# Patient Record
Sex: FEMALE | Race: WHITE | NOT HISPANIC OR LATINO | Employment: PART TIME | URBAN - METROPOLITAN AREA
[De-identification: names, ages, dates, MRNs, and addresses within clinical notes are randomized per-mention and may not be internally consistent; named-entity substitution may affect disease eponyms.]

---

## 2018-04-30 ENCOUNTER — HOSPITAL ENCOUNTER (EMERGENCY)
Facility: HOSPITAL | Age: 28
Discharge: HOME | End: 2018-04-30
Payer: MEDICAID

## 2018-04-30 VITALS
OXYGEN SATURATION: 98 % | HEART RATE: 81 BPM | BODY MASS INDEX: 24.09 KG/M2 | HEIGHT: 68 IN | SYSTOLIC BLOOD PRESSURE: 127 MMHG | RESPIRATION RATE: 20 BRPM | DIASTOLIC BLOOD PRESSURE: 72 MMHG | TEMPERATURE: 99 F | WEIGHT: 158.94 LBS

## 2018-04-30 DIAGNOSIS — R74.8 ELEVATED LIVER ENZYMES: ICD-10-CM

## 2018-04-30 DIAGNOSIS — R10.9 ABDOMINAL PAIN DURING PREGNANCY: Primary | ICD-10-CM

## 2018-04-30 DIAGNOSIS — O26.899 ABDOMINAL PAIN DURING PREGNANCY: Primary | ICD-10-CM

## 2018-04-30 LAB
ALBUMIN SERPL BCP-MCNC: 4.1 G/DL
ALP SERPL-CCNC: 63 U/L
ALT SERPL W/O P-5'-P-CCNC: 584 U/L
ANION GAP SERPL CALC-SCNC: 7 MMOL/L
AST SERPL-CCNC: 249 U/L
BACTERIA GENITAL QL WET PREP: ABNORMAL
BASOPHILS # BLD AUTO: 0.03 K/UL
BASOPHILS NFR BLD: 0.4 %
BILIRUB SERPL-MCNC: 0.5 MG/DL
BILIRUB UR QL STRIP: NEGATIVE
BUN SERPL-MCNC: 11 MG/DL
CALCIUM SERPL-MCNC: 9.2 MG/DL
CHLORIDE SERPL-SCNC: 109 MMOL/L
CLARITY UR: CLEAR
CLUE CELLS VAG QL WET PREP: ABNORMAL
CO2 SERPL-SCNC: 22 MMOL/L
COLOR UR: YELLOW
CREAT SERPL-MCNC: 0.7 MG/DL
DIFFERENTIAL METHOD: NORMAL
EOSINOPHIL # BLD AUTO: 0.2 K/UL
EOSINOPHIL NFR BLD: 1.8 %
ERYTHROCYTE [DISTWIDTH] IN BLOOD BY AUTOMATED COUNT: 13 %
EST. GFR  (AFRICAN AMERICAN): >60 ML/MIN/1.73 M^2
EST. GFR  (NON AFRICAN AMERICAN): >60 ML/MIN/1.73 M^2
FILAMENT FUNGI VAG WET PREP-#/AREA: ABNORMAL
GLUCOSE SERPL-MCNC: 85 MG/DL
GLUCOSE UR QL STRIP: NEGATIVE
HCG INTACT+B SERPL-ACNC: NORMAL MIU/ML
HCT VFR BLD AUTO: 39.5 %
HGB BLD-MCNC: 13.5 G/DL
HGB UR QL STRIP: NEGATIVE
KETONES UR QL STRIP: NEGATIVE
LEUKOCYTE ESTERASE UR QL STRIP: ABNORMAL
LYMPHOCYTES # BLD AUTO: 2.6 K/UL
LYMPHOCYTES NFR BLD: 31.3 %
MCH RBC QN AUTO: 30.4 PG
MCHC RBC AUTO-ENTMCNC: 34.2 G/DL
MCV RBC AUTO: 89 FL
MICROSCOPIC COMMENT: NORMAL
MONOCYTES # BLD AUTO: 0.7 K/UL
MONOCYTES NFR BLD: 8.7 %
NEUTROPHILS # BLD AUTO: 4.9 K/UL
NEUTROPHILS NFR BLD: 57.8 %
NITRITE UR QL STRIP: NEGATIVE
PH UR STRIP: 6 [PH] (ref 5–8)
PLATELET # BLD AUTO: 223 K/UL
PMV BLD AUTO: 9.4 FL
POTASSIUM SERPL-SCNC: 3.9 MMOL/L
PROT SERPL-MCNC: 7 G/DL
PROT UR QL STRIP: NEGATIVE
RBC # BLD AUTO: 4.44 M/UL
RBC #/AREA URNS HPF: 1 /HPF (ref 0–4)
SODIUM SERPL-SCNC: 138 MMOL/L
SP GR UR STRIP: 1.02 (ref 1–1.03)
SPECIMEN SOURCE: ABNORMAL
SQUAMOUS #/AREA URNS HPF: 5 /HPF
T VAGINALIS GENITAL QL WET PREP: ABNORMAL
URN SPEC COLLECT METH UR: ABNORMAL
UROBILINOGEN UR STRIP-ACNC: 1 EU/DL
WBC # BLD AUTO: 8.41 K/UL
WBC #/AREA URNS HPF: 3 /HPF (ref 0–5)
WBC #/AREA VAG WET PREP: ABNORMAL
YEAST GENITAL QL WET PREP: ABNORMAL

## 2018-04-30 PROCEDURE — 99284 EMERGENCY DEPT VISIT MOD MDM: CPT

## 2018-04-30 PROCEDURE — 80053 COMPREHEN METABOLIC PANEL: CPT

## 2018-04-30 PROCEDURE — 84702 CHORIONIC GONADOTROPIN TEST: CPT

## 2018-04-30 PROCEDURE — 87210 SMEAR WET MOUNT SALINE/INK: CPT

## 2018-04-30 PROCEDURE — 81000 URINALYSIS NONAUTO W/SCOPE: CPT

## 2018-04-30 PROCEDURE — 85025 COMPLETE CBC W/AUTO DIFF WBC: CPT

## 2018-04-30 NOTE — ED PROVIDER NOTES
History      Chief Complaint   Patient presents with    Abdominal Pain     + abdominal pain for 3 days , + pregnancy        Review of patient's allergies indicates:  No Known Allergies     HPI   HPI    4/30/2018, 1:42 PM   History obtained from the       History of Present Illness: Shruthi Ram is a 27 y.o. female patient who presents to the Emergency Department for bilateral lower abd pain that radiates to bilateral back for 3 days.  Denies vag bleeding, vag discharge, n/v/f.   +UPT.  LMP 3/8/18.  Symptoms are moderate in severity.     No further complaints or concerns at this time.           PCP: SUE Rogers       Past Medical History:  Past Medical History:   Diagnosis Date    Hepatitis C          Past Surgical History:  History reviewed. No pertinent surgical history.        Family History:  History reviewed. No pertinent family history.        Social History:  Social History     Social History Main Topics    Smoking status: Current Every Day Smoker     Packs/day: 1.00     Types: Cigarettes    Smokeless tobacco: Never Used    Alcohol use No    Drug use: No    Sexual activity: Yes     Partners: Male       ROS     Review of Systems   Constitutional: Negative for chills and fever.   HENT: Negative for sore throat.    Respiratory: Negative for shortness of breath.    Cardiovascular: Negative for chest pain.   Gastrointestinal: Positive for abdominal pain. Negative for nausea and vomiting.   Genitourinary: Negative for dysuria and vaginal bleeding.   Musculoskeletal: Negative for joint swelling, neck pain and neck stiffness.   Skin: Negative for rash.   Neurological: Negative for weakness.   Hematological: Does not bruise/bleed easily.   All other systems reviewed and are negative.      Physical Exam      Initial Vitals [04/30/18 1334]   BP Pulse Resp Temp SpO2   134/74 84 20 98.6 °F (37 °C) 98 %      MAP       94         Physical Exam  Vital signs and nursing notes reviewed.  Constitutional:  "Patient is in NAD. Awake and alert. Well-developed and well-nourished.  Head: Atraumatic. Normocephalic.  Eyes: PERRL. EOM intact. Conjunctivae nl. No scleral icterus.  ENT: Mucous membranes are moist. Oropharynx is clear.  Neck: Supple. No JVD. No lymphadenopathy.  No meningismus  Cardiovascular: Regular rate and rhythm. No murmurs, rubs, or gallops. Distal pulses are 2+ and symmetric.  Pulmonary/Chest: No respiratory distress. Clear to auscultation bilaterally. No wheezing, rales, or rhonchi.  Abdominal: Soft. Non-distended. No TTP. No rebound, guarding, or rigidity. Good bowel sounds.  Genitourinary: No CVA tenderness.  Pelvic Exam: Vaginal exam performed with speculum and a female chaperone was present. No masses, lesions, or obvious trauma noted to external genitalia.   No lesions visible on labia majora or minora. Vaginal epithelium appears pink, moist and well-rugated with no lesions or discharge.  There is no odor, tenderness, or bleeding present. Cervical os is closed. No blood in the vaginal vault.  Patient denies any cervical motion tenderness or adnexal tenderness.  Patient tolerated well.  Musculoskeletal: Moves all extremities. No edema.   Skin: Warm and dry.  No icterus  Neurological: Awake and alert. No acute focal neurological deficits are appreciated.  Psychiatric: Normal affect. Good eye contact. Appropriate in content.      ED Course          Procedures  ED Vital Signs:  Vitals:    04/30/18 1334 04/30/18 1645   BP: 134/74 127/72   Pulse: 84 81   Resp: 20 20   Temp: 98.6 °F (37 °C) 98.5 °F (36.9 °C)   TempSrc: Oral Oral   SpO2: 98% 98%   Weight: 72.1 kg (158 lb 15.2 oz)    Height: 5' 8" (1.727 m)          Results for orders placed or performed during the hospital encounter of 04/30/18   Urinalysis   Result Value Ref Range    Specimen UA Urine, Clean Catch     Color, UA Yellow Yellow, Straw, Jannie    Appearance, UA Clear Clear    pH, UA 6.0 5.0 - 8.0    Specific Gravity, UA 1.020 1.005 - 1.030    " Protein, UA Negative Negative    Glucose, UA Negative Negative    Ketones, UA Negative Negative    Bilirubin (UA) Negative Negative    Occult Blood UA Negative Negative    Nitrite, UA Negative Negative    Urobilinogen, UA 1.0 <2.0 EU/dL    Leukocytes, UA 1+ (A) Negative   CBC auto differential   Result Value Ref Range    WBC 8.41 3.90 - 12.70 K/uL    RBC 4.44 4.00 - 5.40 M/uL    Hemoglobin 13.5 12.0 - 16.0 g/dL    Hematocrit 39.5 37.0 - 48.5 %    MCV 89 82 - 98 fL    MCH 30.4 27.0 - 31.0 pg    MCHC 34.2 32.0 - 36.0 g/dL    RDW 13.0 11.5 - 14.5 %    Platelets 223 150 - 350 K/uL    MPV 9.4 9.2 - 12.9 fL    Gran # (ANC) 4.9 1.8 - 7.7 K/uL    Lymph # 2.6 1.0 - 4.8 K/uL    Mono # 0.7 0.3 - 1.0 K/uL    Eos # 0.2 0.0 - 0.5 K/uL    Baso # 0.03 0.00 - 0.20 K/uL    Gran% 57.8 38.0 - 73.0 %    Lymph% 31.3 18.0 - 48.0 %    Mono% 8.7 4.0 - 15.0 %    Eosinophil% 1.8 0.0 - 8.0 %    Basophil% 0.4 0.0 - 1.9 %    Differential Method Automated    Comprehensive metabolic panel   Result Value Ref Range    Sodium 138 136 - 145 mmol/L    Potassium 3.9 3.5 - 5.1 mmol/L    Chloride 109 95 - 110 mmol/L    CO2 22 (L) 23 - 29 mmol/L    Glucose 85 70 - 110 mg/dL    BUN, Bld 11 6 - 20 mg/dL    Creatinine 0.7 0.5 - 1.4 mg/dL    Calcium 9.2 8.7 - 10.5 mg/dL    Total Protein 7.0 6.0 - 8.4 g/dL    Albumin 4.1 3.5 - 5.2 g/dL    Total Bilirubin 0.5 0.1 - 1.0 mg/dL    Alkaline Phosphatase 63 55 - 135 U/L     (H) 10 - 40 U/L     (H) 10 - 44 U/L    Anion Gap 7 (L) 8 - 16 mmol/L    eGFR if African American >60 >60 mL/min/1.73 m^2    eGFR if non African American >60 >60 mL/min/1.73 m^2   hCG, quantitative, pregnancy   Result Value Ref Range    hCG Quant 96457 See Text mIU/mL   Urinalysis Microscopic   Result Value Ref Range    RBC, UA 1 0 - 4 /hpf    WBC, UA 3 0 - 5 /hpf    Squam Epithel, UA 5 /hpf    Microscopic Comment SEE COMMENT    Vaginal Screen Vagina   Result Value Ref Range    Trichomonas None None    Clue Cells, Wet Prep None None     Budding Yeast None None    Fungal Hyphae None None    WBC - Vaginal Screen Few (A) None    Bacteria - Vaginal Screen Moderate (A) None    Wet Prep Source Vagina None             Imaging Results:  Imaging Results          US OB Less Than 14 Wks with Transvaginal (xpd) (Final result)  Result time 04/30/18 16:05:40   Procedure changed from US OB Less Than 14 Wks First Gestation     Final result by Razia Urban MD (04/30/18 16:05:40)                 Impression:        Early IUP with gestational age of 6 weeks and 0 days and PORTER of 12/27/18  by crown-rump length.      Electronically signed by: RAZIA URBAN MD  Date:     04/30/18  Time:    16:05              Narrative:    History: Abdominal pain during pregnancy    Comparison: None.    Findings:  BetaHCG greater than 12,199    The uterus demonstrates a gestational sac with a mean sac diameter of 1.1 cm.  Within the gestational sac is a fetus measuring 0.4 cm (CRL).  This is consistent with a 6 week and 0 day fetus. Fetal heart rate is 98 bpm.Yolk sac is unremarkable.  No subchorionic hemorrhage is identified. Uterus is retroflexed.    The uterus measures 9.1 x 5.1 x 6.7 cm     Right ovary measures 3.4 x 1.8 x 2.1 cm and is unremarkable.  Left ovary measures 4.5 x 2.5 x 3.4 cm and is unremarkable.                                 The Emergency Provider reviewed the vital signs and test results, which are outlined above.    ED Discussion             Medication(s) given in the ER:  Medications - No data to display        Follow-up Information     SUE Rogers.    Specialty:  Family Medicine  Contact information:  [address] [facility] [address] [phone]             [facility] - OB/ GYN In 2 days.    Specialty:  Obstetrics and Gynecology  Contact information:  [address] [facility] [facility] [address] [phone]  Additional information:  (off [facility]) 4th floor, Please check in for your appointment in the Women's Services Department located  through the doorway to the left of the elevators.                     There are no discharge medications for this patient.         Medical Decision Making      Liver enzymes elevated - Hep C.  Pt agrees to have close fu with obgyn.  No edema, thrombocytopenia, proteinuria, htn.  No icterus or RUQ ttp.      All findings were reviewed with the patient/family in detail.   All remaining questions and concerns were addressed at that time.  Patient/family has been counseled regarding the need for follow-up as well as the indication to return to the emergency room should new or worrisome developments occur.        MDM               Clinical Impression:        ICD-10-CM ICD-9-CM   1. Abdominal pain during pregnancy O26.899 646.80    R10.9 789.00   2. Elevated liver enzymes R74.8 790.5             Jeannette Pacheco PA-C  04/30/18 2059

## 2018-05-01 ENCOUNTER — PES CALL (OUTPATIENT)
Dept: ADMINISTRATIVE | Facility: CLINIC | Age: 28
End: 2018-05-01

## 2018-05-03 ENCOUNTER — LAB VISIT (OUTPATIENT)
Dept: LAB | Facility: HOSPITAL | Age: 28
End: 2018-05-03
Attending: ADVANCED PRACTICE MIDWIFE
Payer: MEDICAID

## 2018-05-03 ENCOUNTER — OFFICE VISIT (OUTPATIENT)
Dept: OBSTETRICS AND GYNECOLOGY | Facility: CLINIC | Age: 28
End: 2018-05-03
Payer: MEDICAID

## 2018-05-03 ENCOUNTER — TELEPHONE (OUTPATIENT)
Dept: OBSTETRICS AND GYNECOLOGY | Facility: CLINIC | Age: 28
End: 2018-05-03

## 2018-05-03 VITALS
WEIGHT: 172.38 LBS | DIASTOLIC BLOOD PRESSURE: 80 MMHG | BODY MASS INDEX: 26.13 KG/M2 | HEIGHT: 68 IN | SYSTOLIC BLOOD PRESSURE: 120 MMHG

## 2018-05-03 DIAGNOSIS — F19.11 HISTORY OF DRUG ABUSE: ICD-10-CM

## 2018-05-03 DIAGNOSIS — R79.89 ELEVATED LIVER FUNCTION TESTS: ICD-10-CM

## 2018-05-03 DIAGNOSIS — N91.2 AMENORRHEA: Primary | ICD-10-CM

## 2018-05-03 DIAGNOSIS — Z87.51 HISTORY OF PRETERM DELIVERY: ICD-10-CM

## 2018-05-03 DIAGNOSIS — N91.2 AMENORRHEA: ICD-10-CM

## 2018-05-03 DIAGNOSIS — Z86.19 HISTORY OF HEPATITIS C: ICD-10-CM

## 2018-05-03 LAB
ABO + RH BLD: NORMAL
AMPHET+METHAMPHET UR QL: NEGATIVE
BARBITURATES UR QL SCN>200 NG/ML: NEGATIVE
BENZODIAZ UR QL SCN>200 NG/ML: NEGATIVE
BLD GP AB SCN CELLS X3 SERPL QL: NORMAL
BZE UR QL SCN: NEGATIVE
CANNABINOIDS UR QL SCN: NEGATIVE
CREAT UR-MCNC: 252 MG/DL
ERYTHROCYTE [DISTWIDTH] IN BLOOD BY AUTOMATED COUNT: 12.9 %
HCT VFR BLD AUTO: 40.2 %
HGB BLD-MCNC: 12.9 G/DL
MCH RBC QN AUTO: 30 PG
MCHC RBC AUTO-ENTMCNC: 32.1 G/DL
MCV RBC AUTO: 94 FL
METHADONE UR QL SCN>300 NG/ML: NEGATIVE
OPIATES UR QL SCN: NEGATIVE
PCP UR QL SCN>25 NG/ML: NEGATIVE
PLATELET # BLD AUTO: 237 K/UL
PMV BLD AUTO: 10.2 FL
RBC # BLD AUTO: 4.3 M/UL
TOXICOLOGY INFORMATION: NORMAL
WBC # BLD AUTO: 9 K/UL

## 2018-05-03 PROCEDURE — 99213 OFFICE O/P EST LOW 20 MIN: CPT | Mod: PBBFAC,25 | Performed by: ADVANCED PRACTICE MIDWIFE

## 2018-05-03 PROCEDURE — 85027 COMPLETE CBC AUTOMATED: CPT

## 2018-05-03 PROCEDURE — 81025 URINE PREGNANCY TEST: CPT | Mod: PBBFAC | Performed by: ADVANCED PRACTICE MIDWIFE

## 2018-05-03 PROCEDURE — 86762 RUBELLA ANTIBODY: CPT

## 2018-05-03 PROCEDURE — 86592 SYPHILIS TEST NON-TREP QUAL: CPT

## 2018-05-03 PROCEDURE — 99999 PR PBB SHADOW E&M-EST. PATIENT-LVL III: CPT | Mod: PBBFAC,,, | Performed by: ADVANCED PRACTICE MIDWIFE

## 2018-05-03 PROCEDURE — 86703 HIV-1/HIV-2 1 RESULT ANTBDY: CPT

## 2018-05-03 PROCEDURE — 80307 DRUG TEST PRSMV CHEM ANLYZR: CPT

## 2018-05-03 PROCEDURE — 87522 HEPATITIS C REVRS TRNSCRPJ: CPT

## 2018-05-03 PROCEDURE — 88175 CYTOPATH C/V AUTO FLUID REDO: CPT

## 2018-05-03 PROCEDURE — 36415 COLL VENOUS BLD VENIPUNCTURE: CPT

## 2018-05-03 PROCEDURE — 87491 CHLMYD TRACH DNA AMP PROBE: CPT

## 2018-05-03 PROCEDURE — 86850 RBC ANTIBODY SCREEN: CPT

## 2018-05-03 PROCEDURE — 80074 ACUTE HEPATITIS PANEL: CPT

## 2018-05-03 PROCEDURE — 99204 OFFICE O/P NEW MOD 45 MIN: CPT | Mod: S$PBB,,, | Performed by: ADVANCED PRACTICE MIDWIFE

## 2018-05-03 NOTE — PROGRESS NOTES
"  Subjective:       Patient ID: Shruthi Ram is a 27 y.o. female.    Chief Complaint:  Possible Pregnancy      History of Present Illness  HPI  Amenorrhea  Patient presents for evaluation of amenorrhea. Currently periods are occurring every 28. Periods were regular in the past. Patient has no relevant history of abnormal sexual development. Is there a chance of pregnancy? yes positive. HCG lab test done? no, not done.  Was seen in the ED 4 days ago for lower back ache. Dating U/S done in the ED. Reports has resolved. Denies vb or cramping at present. Reports " I have had HEP C since  and have not been to a MD for this. Reports H/O elicit drug use " I did them all" after last childbirth in . Denies use today.   GYN & OB History  Patient's last menstrual period was 2018.   Date of Last Pap: No result found    OB History    Para Term  AB Living   4 3 1 2 0 2   SAB TAB Ectopic Multiple Live Births           1      # Outcome Date GA Lbr Kris/2nd Weight Sex Delivery Anes PTL Lv   4  01/01/15 26w0d   U    FD   3 Term 10/10/13 40w0d  1.814 kg (4 lb) M Vag-Spont EPI     2  02/21/10 32w0d  3.289 kg (7 lb 4 oz) M Vag-Spont EPI  JEN   1                    Review of Systems  Review of Systems   Constitutional: Positive for fatigue. Negative for diaphoresis.   Respiratory: Negative for cough.    Gastrointestinal: Negative for constipation, nausea and vomiting.   Genitourinary: Negative for pelvic pain, vaginal bleeding, vaginal discharge, vaginal pain and vaginal odor.   All other systems reviewed and are negative.          Objective:    Physical Exam:   Constitutional: She is oriented to person, place, and time. She appears well-developed and well-nourished.    HENT:   Head: Normocephalic.    Eyes: EOM are normal. Pupils are equal, round, and reactive to light.    Neck: Normal range of motion.    Cardiovascular: Normal rate, regular rhythm and normal heart sounds.   "   Pulmonary/Chest: Effort normal and breath sounds normal.        Abdominal: Soft. Bowel sounds are normal.     Genitourinary: Vagina normal.   Genitourinary Comments: Gravid, uterus s=d           Musculoskeletal: Normal range of motion and moves all extremeties.       Neurological: She is alert and oriented to person, place, and time.    Skin: Skin is warm and dry.    Psychiatric: She has a normal mood and affect. Her behavior is normal. Judgment and thought content normal.          Assessment:        1. Amenorrhea    2. History of hepatitis C    3. Elevated liver function tests    4. History of drug abuse    5. History of  delivery               Plan:      Refer to Hepatologist for Hep C +  [place] at 16 weeks   UDS today  New ob labs  Hep PANEL W/Hep C RNA  Gen Probe  Pap  RTC 4 weeks

## 2018-05-03 NOTE — TELEPHONE ENCOUNTER
Dr Vernon,  we are try to get pt scheduled for a consult,  She + Hep C and She is about 6 wks with a PORTER of 12/27/2018,  Her cell is [phone].   Thanks  Sherrill SHARMAM,  Tf

## 2018-05-04 LAB
C TRACH DNA SPEC QL NAA+PROBE: NOT DETECTED
HAV IGM SERPL QL IA: NEGATIVE
HBV CORE IGM SERPL QL IA: NEGATIVE
HBV SURFACE AG SERPL QL IA: NEGATIVE
HCV AB SERPL QL IA: POSITIVE
HIV 1+2 AB+HIV1 P24 AG SERPL QL IA: NEGATIVE
N GONORRHOEA DNA SPEC QL NAA+PROBE: NOT DETECTED
RPR SER QL: NORMAL
RUBV IGG SER-ACNC: 21.3 IU/ML
RUBV IGG SER-IMP: REACTIVE

## 2018-05-07 LAB
HCV LOG: 6.27 LOG (10) IU/ML
HCV RNA QUANT PCR: ABNORMAL IU/ML
HCV, QUALITATIVE: DETECTED IU/ML

## 2018-05-08 ENCOUNTER — TELEPHONE (OUTPATIENT)
Dept: OBSTETRICS AND GYNECOLOGY | Facility: CLINIC | Age: 28
End: 2018-05-08

## 2018-05-08 DIAGNOSIS — O98.419 HEPATITIS C, ACUTE, MATERNAL, ANTEPARTUM: Primary | ICD-10-CM

## 2018-05-08 DIAGNOSIS — B17.10 HEPATITIS C, ACUTE, MATERNAL, ANTEPARTUM: Primary | ICD-10-CM

## 2018-05-08 NOTE — TELEPHONE ENCOUNTER
Gastro made appt on 06/20/2018 at 400pm at [facility] to see Dr Vernon tf

## 2018-05-08 NOTE — TELEPHONE ENCOUNTER
----- Message from Marguerite Awan sent at 5/8/2018  9:18 AM CDT -----  Contact: pt  States she's calling to f/u on her appt that's suppose to be scheduled for her levels (levels are high). States she's not feeling well and she's 7 weeks. Please call pt at [phone]. Thank you

## 2018-06-01 ENCOUNTER — PROCEDURE VISIT (OUTPATIENT)
Dept: OBSTETRICS AND GYNECOLOGY | Facility: CLINIC | Age: 28
End: 2018-06-01
Payer: MEDICAID

## 2018-06-01 ENCOUNTER — INITIAL PRENATAL (OUTPATIENT)
Dept: OBSTETRICS AND GYNECOLOGY | Facility: CLINIC | Age: 28
End: 2018-06-01
Payer: MEDICAID

## 2018-06-01 ENCOUNTER — LAB VISIT (OUTPATIENT)
Dept: LAB | Facility: HOSPITAL | Age: 28
End: 2018-06-01
Attending: ADVANCED PRACTICE MIDWIFE
Payer: MEDICAID

## 2018-06-01 VITALS
BODY MASS INDEX: 25.95 KG/M2 | WEIGHT: 170.63 LBS | SYSTOLIC BLOOD PRESSURE: 118 MMHG | DIASTOLIC BLOOD PRESSURE: 68 MMHG

## 2018-06-01 DIAGNOSIS — Z3A.10 10 WEEKS GESTATION OF PREGNANCY: ICD-10-CM

## 2018-06-01 DIAGNOSIS — Z34.80 ENCOUNTER FOR SUPERVISION OF NORMAL PREGNANCY IN MULTIGRAVIDA: ICD-10-CM

## 2018-06-01 DIAGNOSIS — R79.89 ELEVATED LIVER FUNCTION TESTS: Primary | ICD-10-CM

## 2018-06-01 DIAGNOSIS — R79.89 ELEVATED LIVER FUNCTION TESTS: ICD-10-CM

## 2018-06-01 DIAGNOSIS — A59.9 TRICHOMONAS INFECTION: ICD-10-CM

## 2018-06-01 DIAGNOSIS — O36.8390 ULTRASOUND SCAN DONE FOR INABILITY TO HEAR FETAL HEART TONES: ICD-10-CM

## 2018-06-01 LAB
ALBUMIN SERPL BCP-MCNC: 3.8 G/DL
ALP SERPL-CCNC: 62 U/L
ALT SERPL W/O P-5'-P-CCNC: 370 U/L
ANION GAP SERPL CALC-SCNC: 6 MMOL/L
AST SERPL-CCNC: 169 U/L
BILIRUB SERPL-MCNC: 0.5 MG/DL
BUN SERPL-MCNC: 8 MG/DL
CALCIUM SERPL-MCNC: 9.2 MG/DL
CHLORIDE SERPL-SCNC: 106 MMOL/L
CO2 SERPL-SCNC: 26 MMOL/L
CREAT SERPL-MCNC: 0.7 MG/DL
EST. GFR  (AFRICAN AMERICAN): >60 ML/MIN/1.73 M^2
EST. GFR  (NON AFRICAN AMERICAN): >60 ML/MIN/1.73 M^2
GLUCOSE SERPL-MCNC: 118 MG/DL
POTASSIUM SERPL-SCNC: 3.6 MMOL/L
PROT SERPL-MCNC: 6.8 G/DL
SODIUM SERPL-SCNC: 138 MMOL/L

## 2018-06-01 PROCEDURE — 99999 PR PBB SHADOW E&M-EST. PATIENT-LVL II: CPT | Mod: PBBFAC,,, | Performed by: ADVANCED PRACTICE MIDWIFE

## 2018-06-01 PROCEDURE — 76815 OB US LIMITED FETUS(S): CPT | Mod: PBBFAC | Performed by: OBSTETRICS & GYNECOLOGY

## 2018-06-01 PROCEDURE — 99213 OFFICE O/P EST LOW 20 MIN: CPT | Mod: 25,TH,S$PBB, | Performed by: ADVANCED PRACTICE MIDWIFE

## 2018-06-01 PROCEDURE — 76815 OB US LIMITED FETUS(S): CPT | Mod: 26,S$PBB,, | Performed by: OBSTETRICS & GYNECOLOGY

## 2018-06-01 PROCEDURE — 36415 COLL VENOUS BLD VENIPUNCTURE: CPT

## 2018-06-01 PROCEDURE — 99212 OFFICE O/P EST SF 10 MIN: CPT | Mod: PBBFAC,TH | Performed by: ADVANCED PRACTICE MIDWIFE

## 2018-06-01 PROCEDURE — 80053 COMPREHEN METABOLIC PANEL: CPT

## 2018-06-01 RX ORDER — METRONIDAZOLE 500 MG/1
500 TABLET ORAL EVERY 12 HOURS
Qty: 14 TABLET | Refills: 0 | Status: SHIPPED | OUTPATIENT
Start: 2018-06-01 | End: 2018-06-08

## 2018-06-01 NOTE — PROGRESS NOTES
Pt here for new ob visit  Oriented to the practice including OPAL/MD collaboration  Reviewed labs  Introduced to Coffective florentino  Blue bag materials reviewed  Warning signs discussed.  Pt. Advised positive Trich on pap Rx sent to take after 12 weeks.  Given alcohol precautions.  Advised to keep appt. With Dr Vernon on 6/20.  Repeat liver enzymes today.  RTC 4 weeks. VM

## 2018-06-19 ENCOUNTER — TELEPHONE (OUTPATIENT)
Dept: GASTROENTEROLOGY | Facility: CLINIC | Age: 28
End: 2018-06-19

## 2018-06-20 ENCOUNTER — OFFICE VISIT (OUTPATIENT)
Dept: GASTROENTEROLOGY | Facility: CLINIC | Age: 28
End: 2018-06-20
Payer: MEDICAID

## 2018-06-20 VITALS
WEIGHT: 167.56 LBS | BODY MASS INDEX: 24.82 KG/M2 | HEIGHT: 69 IN | HEART RATE: 80 BPM | DIASTOLIC BLOOD PRESSURE: 60 MMHG | SYSTOLIC BLOOD PRESSURE: 120 MMHG

## 2018-06-20 DIAGNOSIS — B18.2 CHRONIC HEPATITIS C WITHOUT HEPATIC COMA: Primary | ICD-10-CM

## 2018-06-20 DIAGNOSIS — R79.89 ABNORMAL LFTS: ICD-10-CM

## 2018-06-20 DIAGNOSIS — R53.82 CHRONIC FATIGUE: ICD-10-CM

## 2018-06-20 PROCEDURE — 99999 PR PBB SHADOW E&M-EST. PATIENT-LVL III: CPT | Mod: PBBFAC,,, | Performed by: INTERNAL MEDICINE

## 2018-06-20 PROCEDURE — 99204 OFFICE O/P NEW MOD 45 MIN: CPT | Mod: S$PBB,,, | Performed by: INTERNAL MEDICINE

## 2018-06-20 PROCEDURE — 99213 OFFICE O/P EST LOW 20 MIN: CPT | Mod: PBBFAC,PO | Performed by: INTERNAL MEDICINE

## 2018-06-20 RX ORDER — UBIDECARENONE 75 MG
500 CAPSULE ORAL
COMMUNITY
End: 2018-07-26

## 2018-06-20 NOTE — PROGRESS NOTES
Subjective:     Shruthi Ram is here for evaluation of Hepatitis C (13 weeks pregnant, accompanied by , Sanchez)      HPI  Shruthi Ram is pregnant with HCV. She was dx 2-2 1/2 years ago. Never treated, never biopsied. She did not have HCv with last pregnancies. Previous polysubstance abuser but abstinent for a year.     Liver tests have been very high 300-500s, she reports this is the typical range.    Main symptoms at this time is fatigue which is moderate. She never feels rested and can sleep for long periods of time.    Of note she has an unclear h/o thyroid disease requiring treatment with iodine therapy. Also reports about 3 cases of renal failure from severe kidney infections but this was all during her time of drug use.    Review of Systems   Constitutional: Positive for fatigue.   HENT: Negative.    Eyes: Negative.    Respiratory: Negative.    Cardiovascular: Negative.    Gastrointestinal: Negative.    Genitourinary: Negative.    Musculoskeletal: Negative.    Skin: Positive for color change.   Neurological: Negative.    Psychiatric/Behavioral: Positive for sleep disturbance.       Objective:     Physical Exam   Constitutional: She is oriented to person, place, and time. She appears well-developed and well-nourished. No distress.   HENT:   Head: Normocephalic and atraumatic.   Mouth/Throat: Oropharynx is clear and moist. No oropharyngeal exudate.   Eyes: Conjunctivae are normal. Pupils are equal, round, and reactive to light. Right eye exhibits no discharge. Left eye exhibits no discharge. No scleral icterus.   Pulmonary/Chest: Effort normal and breath sounds normal. No respiratory distress. She has no wheezes.   Abdominal: Soft. She exhibits no distension. There is no tenderness.   Musculoskeletal: She exhibits no edema.   Neurological: She is alert and oriented to person, place, and time.   Psychiatric: She has a normal mood and affect. Her behavior is normal.   Vitals reviewed.      Computed  MELD-Na score unavailable. Necessary lab results were not found in the last year.  Computed MELD score unavailable. Necessary lab results were not found in the last year.    WBC   Date Value Ref Range Status   05/03/2018 9.00 3.90 - 12.70 K/uL Final     Hemoglobin   Date Value Ref Range Status   05/03/2018 12.9 12.0 - 16.0 g/dL Final     Hematocrit   Date Value Ref Range Status   05/03/2018 40.2 37.0 - 48.5 % Final     Platelets   Date Value Ref Range Status   05/03/2018 237 150 - 350 K/uL Final     BUN, Bld   Date Value Ref Range Status   06/01/2018 8 6 - 20 mg/dL Final     Creatinine   Date Value Ref Range Status   06/01/2018 0.7 0.5 - 1.4 mg/dL Final     Glucose   Date Value Ref Range Status   06/01/2018 118 (H) 70 - 110 mg/dL Final     Calcium   Date Value Ref Range Status   06/01/2018 9.2 8.7 - 10.5 mg/dL Final     Sodium   Date Value Ref Range Status   06/01/2018 138 136 - 145 mmol/L Final     Potassium   Date Value Ref Range Status   06/01/2018 3.6 3.5 - 5.1 mmol/L Final     Chloride   Date Value Ref Range Status   06/01/2018 106 95 - 110 mmol/L Final     AST   Date Value Ref Range Status   06/01/2018 169 (H) 10 - 40 U/L Final     ALT   Date Value Ref Range Status   06/01/2018 370 (H) 10 - 44 U/L Final     Alkaline Phosphatase   Date Value Ref Range Status   06/01/2018 62 55 - 135 U/L Final     Total Bilirubin   Date Value Ref Range Status   06/01/2018 0.5 0.1 - 1.0 mg/dL Final     Comment:     For infants and newborns, interpretation of results should be based  on gestational age, weight and in agreement with clinical  observations.  Premature Infant recommended reference ranges:  Up to 24 hours.............<8.0 mg/dL  Up to 48 hours............<12.0 mg/dL  3-5 days..................<15.0 mg/dL  6-29 days.................<15.0 mg/dL       Albumin   Date Value Ref Range Status   06/01/2018 3.8 3.5 - 5.2 g/dL Final     No results found for: INR      Assessment/Plan:     1. Chronic hepatitis C without hepatic  coma    2. Abnormal LFTs    3. Chronic fatigue      Shruthi Ram is a 28 y.o. female withHepatitis C (13 weeks pregnant, accompanied by , Sanchez)    Chronic hepatitis C without hepatic coma  -Educated patient on HCV  -Educated patient about HCV and risk of fetal transmission (~5% if not lower)  -Mode of delivery does not impact transmission  -Breastfeeding does not increase the risk transmission  -No current treatment available while pregnant  -Once patient stable post-partum we can re-eval treatment in the meantime will monitor labs every 3 months until 6 months post partum  -Child should be tested within 1st year with HCV viral load as HCV Ab can be positive from mother  -     Hepatic function panel; Future; Expected date: 06/20/2018  -     US Abdomen Limited; Future; Expected date: 06/20/2018  -     Hepatic function panel; Standing  -     Hepatitis C RNA, quantitative, PCR; Standing    Abnormal LFTs- liver tests are higher than would be expected for HCV alone. Need to eval for other causes of abnormal LFTs  -     Hepatic function panel; Future; Expected date: 06/20/2018  -     Anti-smooth muscle antibody; Future; Expected date: 06/20/2018  -     MILE; Future; Expected date: 06/20/2018  -     Antimitochondrial antibody; Future; Expected date: 06/20/2018  -     Alpha-1-antitrypsin; Future; Expected date: 06/20/2018  -     Ceruloplasmin; Future; Expected date: 06/20/2018  -     US Abdomen Limited; Future; Expected date: 06/20/2018  -     Hepatic function panel; Standing  -     TSH; Future; Expected date: 06/20/2018    Chronic fatigue-unclear if pregnancy related but will eval thyroid given unclear history  -     TSH; Future; Expected date: 06/20/2018    RTC in 3 months       Alda Vernon MD

## 2018-06-20 NOTE — LETTER
June 20, 2018      Sherrill Escobar, [facility]  [address] [place] [address]           [facility]  [address] [place] [address]  Phone: [phone]  Fax: [phone]          Patient: Shruthi Ram   MR Number: [id number]   YOB: 1990   Date of Visit: 6/20/2018       Dear Sherrill Escobar:    Thank you for referring Shruthi Ram to me for evaluation. Attached you will find relevant portions of my assessment and plan of care.    If you have questions, please do not hesitate to call me. I look forward to following Shruthi Ram along with you.    Sincerely,    Alda Vernon MD    Enclosure  CC:  No Recipients    If you would like to receive this communication electronically, please contact [email or web address] or [phone] to request more information on [employer] access.    For providers and/or their staff who would like to refer a patient to [facility], please contact us through our one-stop-shop provider referral line, [facility], at [phone].    If you feel you have received this communication in error or would no longer like to receive these types of communications, please e-mail [email or web address]

## 2018-07-05 ENCOUNTER — TELEPHONE (OUTPATIENT)
Dept: RADIOLOGY | Facility: HOSPITAL | Age: 28
End: 2018-07-05

## 2018-07-06 ENCOUNTER — TELEPHONE (OUTPATIENT)
Dept: GASTROENTEROLOGY | Facility: CLINIC | Age: 28
End: 2018-07-06

## 2018-07-06 NOTE — TELEPHONE ENCOUNTER
Spoke with patient. She had to cancel her appointment for labs, ultrasound and , she has to get insurance. She states she will call back to reschedule, TAWANNA.

## 2018-07-06 NOTE — TELEPHONE ENCOUNTER
----- Message from Lorin Brumfield sent at 7/6/2018 10:44 AM CDT -----  Pt is requesting a call from nurse to discuss r/s her apt for when she renews her insurance.          Please call pt back at [phone]

## 2018-07-19 ENCOUNTER — TELEPHONE (OUTPATIENT)
Dept: OBSTETRICS AND GYNECOLOGY | Facility: CLINIC | Age: 28
End: 2018-07-19

## 2018-07-19 ENCOUNTER — TELEPHONE (OUTPATIENT)
Dept: GASTROENTEROLOGY | Facility: CLINIC | Age: 28
End: 2018-07-19

## 2018-07-19 NOTE — TELEPHONE ENCOUNTER
----- Message from Ashley Castro sent at 7/19/2018  1:00 PM CDT -----  Contact: pt  Calling in regards to scheduling an appointment and being worked into the schedule . [phone] (home)

## 2018-07-19 NOTE — TELEPHONE ENCOUNTER
----- Message from Ashley Castro sent at 7/19/2018 12:56 PM CDT -----  Contact: pt  Calling in regards to being worked into the schedule and have questions and concerns as well  and please dvise. [phone] (home)

## 2018-07-26 ENCOUNTER — TELEPHONE (OUTPATIENT)
Dept: PHARMACY | Facility: CLINIC | Age: 28
End: 2018-07-26

## 2018-07-26 ENCOUNTER — ROUTINE PRENATAL (OUTPATIENT)
Dept: OBSTETRICS AND GYNECOLOGY | Facility: CLINIC | Age: 28
End: 2018-07-26
Payer: MEDICAID

## 2018-07-26 VITALS — DIASTOLIC BLOOD PRESSURE: 60 MMHG | WEIGHT: 169.31 LBS | BODY MASS INDEX: 25 KG/M2 | SYSTOLIC BLOOD PRESSURE: 94 MMHG

## 2018-07-26 DIAGNOSIS — Z36.89 ENCOUNTER FOR FETAL ANATOMIC SURVEY: ICD-10-CM

## 2018-07-26 DIAGNOSIS — Z87.51 HISTORY OF PRETERM DELIVERY: ICD-10-CM

## 2018-07-26 DIAGNOSIS — Z34.80 ENCOUNTER FOR SUPERVISION OF NORMAL PREGNANCY IN MULTIGRAVIDA: ICD-10-CM

## 2018-07-26 DIAGNOSIS — B18.2 CHRONIC HEPATITIS C WITHOUT HEPATIC COMA: Primary | ICD-10-CM

## 2018-07-26 DIAGNOSIS — Z87.74 HISTORY OF CONGENITAL ANOMALY OF HEART: ICD-10-CM

## 2018-07-26 DIAGNOSIS — O09.892 HISTORY OF GBS (GROUP B STREPTOCOCCUS) UTI, CURRENTLY PREGNANT, SECOND TRIMESTER: ICD-10-CM

## 2018-07-26 DIAGNOSIS — Z87.440 HISTORY OF GBS (GROUP B STREPTOCOCCUS) UTI, CURRENTLY PREGNANT, SECOND TRIMESTER: ICD-10-CM

## 2018-07-26 PROCEDURE — 99213 OFFICE O/P EST LOW 20 MIN: CPT | Mod: TH,S$PBB,, | Performed by: ADVANCED PRACTICE MIDWIFE

## 2018-07-26 PROCEDURE — 99999 PR PBB SHADOW E&M-EST. PATIENT-LVL II: CPT | Mod: PBBFAC,,, | Performed by: ADVANCED PRACTICE MIDWIFE

## 2018-07-26 PROCEDURE — 99212 OFFICE O/P EST SF 10 MIN: CPT | Mod: PBBFAC,TH | Performed by: ADVANCED PRACTICE MIDWIFE

## 2018-07-26 RX ORDER — HYDROXYPROGESTERONE CAPROATE 250 MG/ML
250 INJECTION INTRAMUSCULAR
Qty: 4 ML | Refills: 5 | Status: SHIPPED | OUTPATIENT
Start: 2018-07-26 | End: 2018-09-06 | Stop reason: SDUPTHER

## 2018-07-26 NOTE — PROGRESS NOTES
Pt's insurance was cutoff so unable to be seen last visit. Has abd US and labs sched by Dr. Vernon on 7/30/18. Doing well pregnancy wise. +FM, eating well just eats at different times, works 7PM-1AM. Forgot to mention on first visit, son's heart anomalies. Multiple cardiac anomalies, req immed transfer to [facility] for multiple surgeries, he is well today. Exp anatomy scan next OV, if any suboptimal heart images will refer to MFM, cervical length measurement as well. Fifi ordered today for pt, pt aware of freq of injections and indication. Pt asking about cord clamping, desires delayed clamping, reassured.  Will formula feed d/t Hep C treatment that is planned to start once baby born.     Coffective counseling sheet Fall In Love discussed with mother. Reinforced immediate skin to skin, the magic first hour, importance of the first feeding and delaying routine procedures. Encouraged mother to download Coffective mobile florentino if she has not already done so. Mother verbalizes understanding.  Coffective counseling sheet Learn Your Baby discussed with mother. Instructed regarding feeding cues and methods to calm baby. Encouraged mother to download Coffective mobile florentino if she has not already done so.  Mother verbalized understanding.  al

## 2018-07-27 ENCOUNTER — TELEPHONE (OUTPATIENT)
Dept: RADIOLOGY | Facility: HOSPITAL | Age: 28
End: 2018-07-27

## 2018-07-30 ENCOUNTER — HOSPITAL ENCOUNTER (OUTPATIENT)
Dept: RADIOLOGY | Facility: HOSPITAL | Age: 28
Discharge: HOME | End: 2018-07-30
Attending: INTERNAL MEDICINE
Payer: MEDICAID

## 2018-07-30 DIAGNOSIS — B18.2 CHRONIC HEPATITIS C WITHOUT HEPATIC COMA: ICD-10-CM

## 2018-07-30 DIAGNOSIS — R79.89 ABNORMAL LFTS: ICD-10-CM

## 2018-07-30 PROCEDURE — 76705 ECHO EXAM OF ABDOMEN: CPT | Mod: 26,,, | Performed by: RADIOLOGY

## 2018-07-30 PROCEDURE — 76705 ECHO EXAM OF ABDOMEN: CPT | Mod: TC,PO

## 2018-08-02 DIAGNOSIS — K76.9 LIVER LESION: ICD-10-CM

## 2018-08-03 NOTE — TELEPHONE ENCOUNTER
Fifi consultation and consent to coordinate shipment with [facility] attempted.  Patient was at work and hung up phone when supervisor came by.   She is expecting to receive Fifi at [facility] on 8/10/18.  $0 copay in 004.

## 2018-08-10 ENCOUNTER — PROCEDURE VISIT (OUTPATIENT)
Dept: OBSTETRICS AND GYNECOLOGY | Facility: CLINIC | Age: 28
End: 2018-08-10
Payer: MEDICAID

## 2018-08-10 ENCOUNTER — PATIENT MESSAGE (OUTPATIENT)
Dept: GASTROENTEROLOGY | Facility: CLINIC | Age: 28
End: 2018-08-10

## 2018-08-10 ENCOUNTER — TELEPHONE (OUTPATIENT)
Dept: OBSTETRICS AND GYNECOLOGY | Facility: CLINIC | Age: 28
End: 2018-08-10

## 2018-08-10 ENCOUNTER — ROUTINE PRENATAL (OUTPATIENT)
Dept: OBSTETRICS AND GYNECOLOGY | Facility: CLINIC | Age: 28
End: 2018-08-10
Payer: MEDICAID

## 2018-08-10 VITALS — BODY MASS INDEX: 25.33 KG/M2 | SYSTOLIC BLOOD PRESSURE: 104 MMHG | WEIGHT: 171.5 LBS | DIASTOLIC BLOOD PRESSURE: 60 MMHG

## 2018-08-10 DIAGNOSIS — Z87.51 HISTORY OF PRETERM DELIVERY: Primary | ICD-10-CM

## 2018-08-10 DIAGNOSIS — O09.892 HISTORY OF GBS (GROUP B STREPTOCOCCUS) UTI, CURRENTLY PREGNANT, SECOND TRIMESTER: ICD-10-CM

## 2018-08-10 DIAGNOSIS — Z87.440 HISTORY OF GBS (GROUP B STREPTOCOCCUS) UTI, CURRENTLY PREGNANT, SECOND TRIMESTER: ICD-10-CM

## 2018-08-10 DIAGNOSIS — Z36.89 ENCOUNTER FOR FETAL ANATOMIC SURVEY: ICD-10-CM

## 2018-08-10 PROCEDURE — 76805 OB US >/= 14 WKS SNGL FETUS: CPT | Mod: PBBFAC | Performed by: OBSTETRICS & GYNECOLOGY

## 2018-08-10 PROCEDURE — 76805 OB US >/= 14 WKS SNGL FETUS: CPT | Mod: 26,S$PBB,, | Performed by: OBSTETRICS & GYNECOLOGY

## 2018-08-10 PROCEDURE — 99212 OFFICE O/P EST SF 10 MIN: CPT | Mod: PBBFAC,TH | Performed by: ADVANCED PRACTICE MIDWIFE

## 2018-08-10 PROCEDURE — 99999 PR PBB SHADOW E&M-EST. PATIENT-LVL II: CPT | Mod: PBBFAC,,, | Performed by: ADVANCED PRACTICE MIDWIFE

## 2018-08-10 PROCEDURE — 99213 OFFICE O/P EST LOW 20 MIN: CPT | Mod: TH,S$PBB,, | Performed by: ADVANCED PRACTICE MIDWIFE

## 2018-08-10 NOTE — TELEPHONE ENCOUNTER
Spoke with [facility] about status of [place]. They state they will deliver Fifi on Wednesday 8/ 15/18.

## 2018-08-14 PROBLEM — O09.212 HIGH RISK PREGNANCY DUE TO HISTORY OF PRETERM LABOR IN SECOND TRIMESTER: Status: ACTIVE | Noted: 2018-06-01

## 2018-08-14 NOTE — PROGRESS NOTES
US today anatomy complete , planning reveal 9/20. Round ligament pain discussed and comfort measures. Starting francois today, aware weekly injections. al

## 2018-08-14 NOTE — TELEPHONE ENCOUNTER
Patient called to ensure delivery will be there for her appt on 8/16/18. With patient consent, Fifi delivered to [facility] on 8/14/18. She declines consultation and clinical services from [facility], already discussed with MD. [facility] to continue to reach out to patient for refills ONLY. Patient understands that we must speak to her to coordinate deliveries to [facility]. MALLORIE

## 2018-08-16 ENCOUNTER — CLINICAL SUPPORT (OUTPATIENT)
Dept: OBSTETRICS AND GYNECOLOGY | Facility: CLINIC | Age: 28
End: 2018-08-16
Payer: MEDICAID

## 2018-08-16 DIAGNOSIS — Z87.51 HISTORY OF PRETERM DELIVERY: Primary | ICD-10-CM

## 2018-08-16 PROCEDURE — 96372 THER/PROPH/DIAG INJ SC/IM: CPT | Mod: PBBFAC

## 2018-08-16 RX ORDER — HYDROXYPROGESTERONE CAPROATE 250 MG/ML
250 INJECTION INTRAMUSCULAR
Status: ACTIVE | OUTPATIENT
Start: 2018-08-17 | End: 2018-11-30

## 2018-08-16 RX ADMIN — HYDROXYPROGESTERONE CAPROATE 250 MG: 250 INJECTION INTRAMUSCULAR at 02:08

## 2018-08-16 NOTE — PROGRESS NOTES
Fifi 250mg given IM without difficulty.  Patient tolerated well.  Patient instructed to remain in clinic for 15 minutes following injection.  Patient states she already had her appointment for her next injection.  Patient verbalized understanding of instructions.

## 2018-08-20 ENCOUNTER — TELEPHONE (OUTPATIENT)
Dept: OBSTETRICS AND GYNECOLOGY | Facility: CLINIC | Age: 28
End: 2018-08-20

## 2018-08-20 ENCOUNTER — HOSPITAL ENCOUNTER (EMERGENCY)
Facility: HOSPITAL | Age: 28
Discharge: HOME | End: 2018-08-20
Attending: EMERGENCY MEDICINE
Payer: MEDICAID

## 2018-08-20 VITALS
HEART RATE: 89 BPM | BODY MASS INDEX: 26.47 KG/M2 | OXYGEN SATURATION: 99 % | RESPIRATION RATE: 16 BRPM | SYSTOLIC BLOOD PRESSURE: 129 MMHG | TEMPERATURE: 98 F | DIASTOLIC BLOOD PRESSURE: 77 MMHG | HEIGHT: 68 IN | WEIGHT: 174.63 LBS

## 2018-08-20 DIAGNOSIS — J02.9 PHARYNGITIS, UNSPECIFIED ETIOLOGY: Primary | ICD-10-CM

## 2018-08-20 DIAGNOSIS — Z3A.28 28 WEEKS GESTATION OF PREGNANCY: ICD-10-CM

## 2018-08-20 DIAGNOSIS — Z72.0 TOBACCO USE: ICD-10-CM

## 2018-08-20 PROCEDURE — 99283 EMERGENCY DEPT VISIT LOW MDM: CPT

## 2018-08-20 RX ORDER — AMOXICILLIN 875 MG/1
875 TABLET, FILM COATED ORAL EVERY 12 HOURS
Qty: 14 TABLET | Refills: 0 | Status: SHIPPED | OUTPATIENT
Start: 2018-08-20 | End: 2018-08-27

## 2018-08-20 NOTE — TELEPHONE ENCOUNTER
Spoke with patient, went to ER for abscessed tooth.  Was given Amoxil and will want dental clearance when she comes in on Thursday.  Patient notified to call if needs sooner dental clearance.

## 2018-08-20 NOTE — ED PROVIDER NOTES
Encounter Date: 8/20/2018       History     Chief Complaint   Patient presents with    Sore Throat     states difficulty swallowing.  4.5 mos pregnant.     The patient is currently 4 months pregnant. She presents to the ER c/o a sore throat. She states that the discomfort began gradually over the past 24 hours. She states that the degree of pain is moderate. She states that the course is constant. She states that she has associated pain and swelling of her submandibular glands. She states that smoking exacerbates the discomfort. She states that taking Winters's throat drops mitigates the symptoms. She denies any additional symptoms or concerns. She denies any known sick contacts.            Review of patient's allergies indicates:  No Known Allergies  Past Medical History:   Diagnosis Date    Hepatitis C     Miscarriage      Past Surgical History:   Procedure Laterality Date    DILATION AND CURETTAGE OF UTERUS      TONSILLECTOMY       Family History   Problem Relation Age of Onset    Heart defect Mother      Social History     Tobacco Use    Smoking status: Current Every Day Smoker     Packs/day: 1.00     Types: Cigarettes    Smokeless tobacco: Never Used   Substance Use Topics    Alcohol use: No    Drug use: Yes     Comment: polysubstance abuser      Review of Systems   Constitutional: Negative for activity change, appetite change, chills, diaphoresis, fatigue and fever.   HENT: Positive for sore throat. Negative for congestion, drooling, ear discharge, ear pain, facial swelling, postnasal drip, rhinorrhea, sinus pressure, sneezing, trouble swallowing and voice change.    Respiratory: Negative for cough, shortness of breath, wheezing and stridor.    Cardiovascular: Negative for chest pain and palpitations.   Gastrointestinal: Negative for abdominal pain, diarrhea, nausea and vomiting.   Endocrine: Negative for polydipsia and polyuria.   Genitourinary: Negative for decreased urine volume, dysuria, flank pain,  frequency, menstrual problem, pelvic pain, vaginal bleeding and vaginal discharge.   Musculoskeletal: Negative for arthralgias, back pain, gait problem, myalgias and neck pain.   Skin: Negative for color change and rash.   Neurological: Negative for dizziness, seizures, syncope, speech difficulty, weakness, light-headedness, numbness and headaches.   Hematological: Negative for adenopathy.   Psychiatric/Behavioral: Negative for confusion.       Physical Exam     Initial Vitals [08/20/18 0135]   BP Pulse Resp Temp SpO2   129/77 89 16 98.1 °F (36.7 °C) 99 %      MAP       --         Physical Exam    Nursing note and vitals reviewed.  Constitutional: She appears well-developed and well-nourished. She is not diaphoretic.   HENT:   Head: Normocephalic.   Minimal oropharyngeal erythema. No swelling or exudate. Uvula midline and not edematous. No abscess. No induration to oral floor.l No drooling,stridor, or trismus. No muffled voice or hoarseness. Patent airway. No adenopathy. Normal otic exam. Mild enlargement of left submandibular gland.    Eyes: Conjunctivae are normal.   Neck: Normal range of motion. Neck supple.   Cardiovascular: Normal rate, regular rhythm and intact distal pulses.   Pulmonary/Chest: Breath sounds normal. No respiratory distress.   Abdominal: Soft. There is no tenderness. There is no guarding.   Age appropriate fundal height. Non-tender.    Musculoskeletal: Normal range of motion.   Lymphadenopathy:     She has no cervical adenopathy.   Neurological: She is alert and oriented to person, place, and time. She has normal strength. No cranial nerve deficit or sensory deficit.   Skin: Skin is warm and dry. No rash noted.   Psychiatric: She has a normal mood and affect. Her behavior is normal.         ED Course   Procedures  Labs Reviewed - No data to display       Imaging Results    None          Medical Decision Making:   History:   Old Medical Records: I decided to obtain old medical  records.    Additional MDM:   Smoking Cessation: The patient is a smoker. The patient was counseled on smoking cessation for: 3 minutes. The patient was counseled on tobacco related  health complications. Appropriate patient literature was given to the patient concerning tobacco cessation.                    Clinical Impression:   The primary encounter diagnosis was Pharyngitis, unspecified etiology. Diagnoses of Tobacco use and 28 weeks gestation of pregnancy were also pertinent to this visit.                             Carlos Maciel PA-C  08/20/18 0210

## 2018-08-20 NOTE — TELEPHONE ENCOUNTER
----- Message from Nirali Brown sent at 8/20/2018  3:06 PM CDT -----  Contact: self [phone]  States that she is calling to speak to nurse regarding an abscess in her throat that she is taking antibiotics for. Please call back at [phone]//thank you acc

## 2018-08-20 NOTE — TELEPHONE ENCOUNTER
States that she has an abscess tooth and needs to be cleared for a dental visit. Please call back at [phone]//thank you acc

## 2018-08-23 ENCOUNTER — CLINICAL SUPPORT (OUTPATIENT)
Dept: OBSTETRICS AND GYNECOLOGY | Facility: CLINIC | Age: 28
End: 2018-08-23
Payer: MEDICAID

## 2018-08-23 PROCEDURE — 96372 THER/PROPH/DIAG INJ SC/IM: CPT | Mod: PBBFAC

## 2018-08-23 PROCEDURE — 99211 OFF/OP EST MAY X REQ PHY/QHP: CPT | Mod: PBBFAC

## 2018-08-23 PROCEDURE — 99999 PR PBB SHADOW E&M-EST. PATIENT-LVL I: CPT | Mod: PBBFAC,,,

## 2018-08-23 RX ADMIN — HYDROXYPROGESTERONE CAPROATE 250 MG: 250 INJECTION INTRAMUSCULAR at 02:08

## 2018-08-23 NOTE — PROGRESS NOTES
Two pt identifiers verified  pt notified to wait in clinic for 15 minutes after receiving injection, pt verbalized understanding  [place] administered IM to pt's left ventrogluteal.   Pt tolerated well  Next injection scheduled

## 2018-08-30 ENCOUNTER — CLINICAL SUPPORT (OUTPATIENT)
Dept: OBSTETRICS AND GYNECOLOGY | Facility: CLINIC | Age: 28
End: 2018-08-30
Payer: MEDICAID

## 2018-08-30 DIAGNOSIS — O09.899 HISTORY OF PRETERM DELIVERY, CURRENTLY PREGNANT: Primary | ICD-10-CM

## 2018-08-30 PROCEDURE — 96372 THER/PROPH/DIAG INJ SC/IM: CPT | Mod: PBBFAC

## 2018-08-30 RX ADMIN — HYDROXYPROGESTERONE CAPROATE 250 MG: 250 INJECTION INTRAMUSCULAR at 02:08

## 2018-08-30 NOTE — PROGRESS NOTES
Hydroxyprogesterone 250mg given IM without difficulty.  Patient tolerated well.  Patient instructed to remain in clinic for 15 minutes following injection.  Patient verbalized understanding of instructions.

## 2018-09-06 ENCOUNTER — ROUTINE PRENATAL (OUTPATIENT)
Dept: OBSTETRICS AND GYNECOLOGY | Facility: CLINIC | Age: 28
End: 2018-09-06
Payer: MEDICAID

## 2018-09-06 VITALS
DIASTOLIC BLOOD PRESSURE: 60 MMHG | SYSTOLIC BLOOD PRESSURE: 110 MMHG | BODY MASS INDEX: 27.05 KG/M2 | WEIGHT: 177.94 LBS

## 2018-09-06 DIAGNOSIS — O09.892 HISTORY OF GBS (GROUP B STREPTOCOCCUS) UTI, CURRENTLY PREGNANT, SECOND TRIMESTER: Primary | ICD-10-CM

## 2018-09-06 DIAGNOSIS — O09.212 HIGH RISK PREGNANCY DUE TO HISTORY OF PRETERM LABOR IN SECOND TRIMESTER: ICD-10-CM

## 2018-09-06 DIAGNOSIS — Z87.440 HISTORY OF GBS (GROUP B STREPTOCOCCUS) UTI, CURRENTLY PREGNANT, SECOND TRIMESTER: Primary | ICD-10-CM

## 2018-09-06 PROCEDURE — 99212 OFFICE O/P EST SF 10 MIN: CPT | Mod: PBBFAC,TH | Performed by: ADVANCED PRACTICE MIDWIFE

## 2018-09-06 PROCEDURE — 99213 OFFICE O/P EST LOW 20 MIN: CPT | Mod: TH,S$PBB,, | Performed by: ADVANCED PRACTICE MIDWIFE

## 2018-09-06 PROCEDURE — 99999 PR PBB SHADOW E&M-EST. PATIENT-LVL II: CPT | Mod: PBBFAC,,, | Performed by: ADVANCED PRACTICE MIDWIFE

## 2018-09-06 RX ADMIN — HYDROXYPROGESTERONE CAPROATE 250 MG: 250 INJECTION INTRAMUSCULAR at 03:09

## 2018-09-06 NOTE — PROGRESS NOTES
Valmy 250mg given IM to left ventrogluteal.  Pt tolerated well.  Pt advised to wait 10-15 minutes for s/s of medication reaction.  Appt made for next injection on 9/13/18 at 2:30pm at [facility] location, per pt request.  Pt voiced understanding.  CYNDIE GUEVARA

## 2018-09-06 NOTE — PROGRESS NOTES
Doing well, denies ctx. Good FM. tdap handout provided and exp, labs next OV. Pt states francois is making her more emotional.   Coffective counseling sheet Nourish discussed with mother. Reinforced basic breastfeeding position and latch as well as proper hand expression technique. Encouraged mother to download Coffective mobile florentino if she has not already done so.  Mother verbalizes understanding. al

## 2018-09-10 ENCOUNTER — TELEPHONE (OUTPATIENT)
Dept: PHARMACY | Facility: CLINIC | Age: 28
End: 2018-09-10

## 2018-09-10 ENCOUNTER — PATIENT MESSAGE (OUTPATIENT)
Dept: ADMINISTRATIVE | Facility: OTHER | Age: 28
End: 2018-09-10

## 2018-09-10 NOTE — TELEPHONE ENCOUNTER
"Fifi refill attempted - patient declines. She states that the [place] has been making her feel horrible. She has had about 5 injections and does not feel well after the injections, where she "poops" all day the day after. She has not gotten any sleep, but does not feel tired. She isn't having "mood swings", but she just has NO mood at all. She has no interest in anything and her coworkers notice there is a drastic difference in her. She has pains in her hands and feet, where she feels there is some swelling; especially her left foot where it is practically curling. She understands the reason for being on [place], but she just cannot continue the injections. She mentions having been diagnosed with HCV and she does not think her liver is in the best shape to continue Fifi as well due to it's potential to cause liver problems. She denies any symptoms indicative of potential liver damage though.     Routing provider to advise. TTN  "

## 2018-09-12 ENCOUNTER — TELEPHONE (OUTPATIENT)
Dept: OBSTETRICS AND GYNECOLOGY | Facility: CLINIC | Age: 28
End: 2018-09-12

## 2018-09-12 NOTE — TELEPHONE ENCOUNTER
MD communication with patient:   I called  Patient does NOT want to be on [place] anymore. I stressed the reason for treatment and potential for pre-term delivery again if not continued, but she said it makes her feel too badly to be able to continue and be physically and mentally well enough to work. She cannot risk losing her job. She is refusing further injections at this time. Please let me know if there is anything I can help with. She would like a call back with your recommendation as well.  After talking to her  "My  wants me to keep getting it",  She will keep her appt for tomorrow -Kayleigh Maher MA    Delivery set up for 9/13/18 STAT for appt at 2:30pm. Patient does not want to continue, but does not want to go against advice of MD, so grants consent for [facility] to deliver francois to [facility]. MERYL Fontaine confirmed address. $0 copay.TTN

## 2018-09-12 NOTE — TELEPHONE ENCOUNTER
I called  Patient does NOT want to be on [place] anymore. I stressed the reason for treatment and potential for pre-term delivery again if not continued, but she said it makes her feel too badly to be able to continue and be physically and mentally well enough to work. She cannot risk losing her job. She is refusing further injections at this time. Please let me know if there is anything I can help with. She would like a call back with your recommendation as well.  After talking to her  "My  wants me to keep getting it",  She will keep her appt for tomorrow tf

## 2018-09-13 ENCOUNTER — CLINICAL SUPPORT (OUTPATIENT)
Dept: OBSTETRICS AND GYNECOLOGY | Facility: CLINIC | Age: 28
End: 2018-09-13
Payer: MEDICAID

## 2018-09-13 PROCEDURE — 96372 THER/PROPH/DIAG INJ SC/IM: CPT | Mod: PBBFAC

## 2018-09-13 RX ADMIN — HYDROXYPROGESTERONE CAPROATE 250 MG: 250 INJECTION INTRAMUSCULAR at 02:09

## 2018-09-13 NOTE — PROGRESS NOTES
Pt. Received francois injection today. Pt. Tolerated well. Instructed pt. To wait in clinic for 15 minutes. Pt. Voiced understanding.

## 2018-09-20 ENCOUNTER — CLINICAL SUPPORT (OUTPATIENT)
Dept: OBSTETRICS AND GYNECOLOGY | Facility: CLINIC | Age: 28
End: 2018-09-20
Payer: MEDICAID

## 2018-09-20 DIAGNOSIS — Z87.51 HISTORY OF PRETERM DELIVERY: Primary | ICD-10-CM

## 2018-09-20 PROCEDURE — 96372 THER/PROPH/DIAG INJ SC/IM: CPT | Mod: PBBFAC

## 2018-09-20 RX ADMIN — HYDROXYPROGESTERONE CAPROATE 250 MG: 250 INJECTION INTRAMUSCULAR at 01:09

## 2018-09-20 NOTE — PROGRESS NOTES
Hydroxyprogesterone given IM without difficulty.  Patient tolerated well.  Patient instructed to remain in clinic for 15 minutes following injection.  Appointment given for next injection given. Patient verbalized understanding of instructions.

## 2018-09-26 ENCOUNTER — TELEPHONE (OUTPATIENT)
Dept: OBSTETRICS AND GYNECOLOGY | Facility: CLINIC | Age: 28
End: 2018-09-26

## 2018-09-26 NOTE — TELEPHONE ENCOUNTER
----- Message from Ashley Castro sent at 9/26/2018  2:00 PM CDT -----  Contact: pt  Calling in regards to have questions and health concerns before upcoming appointment and please advise [phone] (home)

## 2018-09-26 NOTE — TELEPHONE ENCOUNTER
"Pt c/o "lot of pain in right leg, really deep in my leg, hard to pick-up, gets hot when I stand up; I googled it and it said the francois shots could cause blood clots."  Pt advised to go to ER for evaluation.  Pt voiced understanding.  PAOLA, LPN  "

## 2018-09-26 NOTE — TELEPHONE ENCOUNTER
Pt called stating her right leg is hurting  And she believes it may be a blood clot, Pt also stated she has nurse visit on tomorrow for Fifi. I advised pt to go to the ER so that they can run the correct test on her. Pt states she will go to [facility] ER when she gets off. Advised pt to give us a call if there is anything else we can do and advised her to keep her nurse visit on tomorrow.

## 2018-09-27 ENCOUNTER — CLINICAL SUPPORT (OUTPATIENT)
Dept: OBSTETRICS AND GYNECOLOGY | Facility: CLINIC | Age: 28
End: 2018-09-27
Payer: MEDICAID

## 2018-09-27 PROCEDURE — 96372 THER/PROPH/DIAG INJ SC/IM: CPT | Mod: PBBFAC

## 2018-09-27 RX ADMIN — HYDROXYPROGESTERONE CAPROATE 250 MG: 250 INJECTION INTRAMUSCULAR at 03:09

## 2018-09-27 NOTE — PROGRESS NOTES
Two pt identifiers verified  pt notified to wait in clinic for 15 minutes after receiving injection, pt verbalized understanding  250 mg of [place] administered IM to pt's left ventrogluteal.   Pt tolerated well  Next injection scheduled.

## 2018-10-04 ENCOUNTER — CLINICAL SUPPORT (OUTPATIENT)
Dept: OBSTETRICS AND GYNECOLOGY | Facility: CLINIC | Age: 28
End: 2018-10-04
Payer: MEDICAID

## 2018-10-04 PROCEDURE — 96372 THER/PROPH/DIAG INJ SC/IM: CPT | Mod: PBBFAC

## 2018-10-04 RX ADMIN — HYDROXYPROGESTERONE CAPROATE 250 MG: 250 INJECTION INTRAMUSCULAR at 11:10

## 2018-10-04 NOTE — PROGRESS NOTES
Verified patient with 2 patient identifiers. Allergies and medications reviewed.   Nenahnezad 250mg/ml given IM to left ventrogluteal using aseptic technique.   No discomfort noted. Patient tolerated well.     Next injection scheduled 10/11/18 @ 200.    Patient advised to wait 15 minutes in lobby to monitor for reaction.   Patient verbalized understanding.

## 2018-10-09 ENCOUNTER — LAB VISIT (OUTPATIENT)
Dept: LAB | Facility: HOSPITAL | Age: 28
End: 2018-10-09
Attending: ADVANCED PRACTICE MIDWIFE
Payer: MEDICAID

## 2018-10-09 ENCOUNTER — ROUTINE PRENATAL (OUTPATIENT)
Dept: OBSTETRICS AND GYNECOLOGY | Facility: CLINIC | Age: 28
End: 2018-10-09
Payer: MEDICAID

## 2018-10-09 VITALS — SYSTOLIC BLOOD PRESSURE: 112 MMHG | WEIGHT: 181 LBS | BODY MASS INDEX: 27.52 KG/M2 | DIASTOLIC BLOOD PRESSURE: 70 MMHG

## 2018-10-09 DIAGNOSIS — B18.2 CHRONIC HEPATITIS C WITHOUT HEPATIC COMA: ICD-10-CM

## 2018-10-09 DIAGNOSIS — O09.212 HIGH RISK PREGNANCY DUE TO HISTORY OF PRETERM LABOR IN SECOND TRIMESTER: ICD-10-CM

## 2018-10-09 DIAGNOSIS — R79.89 ABNORMAL LFTS: ICD-10-CM

## 2018-10-09 DIAGNOSIS — O09.212 HIGH RISK PREGNANCY DUE TO HISTORY OF PRETERM LABOR IN SECOND TRIMESTER: Primary | ICD-10-CM

## 2018-10-09 LAB
ALBUMIN SERPL BCP-MCNC: 2.9 G/DL
ALP SERPL-CCNC: 93 U/L
ALT SERPL W/O P-5'-P-CCNC: 52 U/L
AST SERPL-CCNC: 31 U/L
BASOPHILS # BLD AUTO: 0.04 K/UL
BASOPHILS NFR BLD: 0.3 %
BILIRUB DIRECT SERPL-MCNC: 0.1 MG/DL
BILIRUB SERPL-MCNC: 0.3 MG/DL
DIFFERENTIAL METHOD: ABNORMAL
EOSINOPHIL # BLD AUTO: 0.3 K/UL
EOSINOPHIL NFR BLD: 2 %
ERYTHROCYTE [DISTWIDTH] IN BLOOD BY AUTOMATED COUNT: 13.7 %
GLUCOSE SERPL-MCNC: 74 MG/DL
HCT VFR BLD AUTO: 34.6 %
HGB BLD-MCNC: 11.1 G/DL
IMM GRANULOCYTES # BLD AUTO: 0.08 K/UL
IMM GRANULOCYTES NFR BLD AUTO: 0.6 %
LYMPHOCYTES # BLD AUTO: 2.8 K/UL
LYMPHOCYTES NFR BLD: 20.7 %
MCH RBC QN AUTO: 30.7 PG
MCHC RBC AUTO-ENTMCNC: 32.1 G/DL
MCV RBC AUTO: 96 FL
MONOCYTES # BLD AUTO: 1.1 K/UL
MONOCYTES NFR BLD: 7.7 %
NEUTROPHILS # BLD AUTO: 9.4 K/UL
NEUTROPHILS NFR BLD: 68.7 %
NRBC BLD-RTO: 0 /100 WBC
PLATELET # BLD AUTO: 254 K/UL
PMV BLD AUTO: 10.3 FL
PROT SERPL-MCNC: 6.2 G/DL
RBC # BLD AUTO: 3.61 M/UL
WBC # BLD AUTO: 13.71 K/UL

## 2018-10-09 PROCEDURE — 99212 OFFICE O/P EST SF 10 MIN: CPT | Mod: PBBFAC,TH | Performed by: ADVANCED PRACTICE MIDWIFE

## 2018-10-09 PROCEDURE — 87522 HEPATITIS C REVRS TRNSCRPJ: CPT

## 2018-10-09 PROCEDURE — 36415 COLL VENOUS BLD VENIPUNCTURE: CPT

## 2018-10-09 PROCEDURE — 86592 SYPHILIS TEST NON-TREP QUAL: CPT

## 2018-10-09 PROCEDURE — 90471 IMMUNIZATION ADMIN: CPT | Mod: PBBFAC

## 2018-10-09 PROCEDURE — 82950 GLUCOSE TEST: CPT

## 2018-10-09 PROCEDURE — 99999 PR PBB SHADOW E&M-EST. PATIENT-LVL II: CPT | Mod: PBBFAC,,, | Performed by: ADVANCED PRACTICE MIDWIFE

## 2018-10-09 PROCEDURE — 99213 OFFICE O/P EST LOW 20 MIN: CPT | Mod: TH,S$PBB,, | Performed by: ADVANCED PRACTICE MIDWIFE

## 2018-10-09 PROCEDURE — 80076 HEPATIC FUNCTION PANEL: CPT

## 2018-10-09 PROCEDURE — 86703 HIV-1/HIV-2 1 RESULT ANTBDY: CPT

## 2018-10-09 PROCEDURE — 85025 COMPLETE CBC W/AUTO DIFF WBC: CPT

## 2018-10-09 NOTE — PROGRESS NOTES
Tdap given IM to left deltoid.  Pt tolerated well.  Pt advised to wait 10-15 minutes for s/s of medication reaction, (more than 10 minutes spent with pt while making future appoinments, no s/s of medication reaction noted.)  Pt voiced understanding.  VB, LPN

## 2018-10-09 NOTE — PROGRESS NOTES
Doing well today with no complaints.   3rd tri labs today, will discuss results next visit.   [place] shot to be done on Thurs.   Tdap today.   Discussed birth control options for after delivery . Interested in IUD.   Discussed PTL/FCK/ROM precautions.

## 2018-10-10 ENCOUNTER — TELEPHONE (OUTPATIENT)
Dept: OBSTETRICS AND GYNECOLOGY | Facility: CLINIC | Age: 28
End: 2018-10-10

## 2018-10-10 ENCOUNTER — PATIENT MESSAGE (OUTPATIENT)
Dept: OBSTETRICS AND GYNECOLOGY | Facility: CLINIC | Age: 28
End: 2018-10-10

## 2018-10-10 LAB
HIV 1+2 AB+HIV1 P24 AG SERPL QL IA: NEGATIVE
RPR SER QL: NORMAL

## 2018-10-10 NOTE — TELEPHONE ENCOUNTER
----- Message from Briana Whitt sent at 10/10/2018 11:20 AM CDT -----  Contact: pt  Pt would like nurse to return call. Pt didn't state the reason for call.

## 2018-10-11 ENCOUNTER — CLINICAL SUPPORT (OUTPATIENT)
Dept: OBSTETRICS AND GYNECOLOGY | Facility: CLINIC | Age: 28
End: 2018-10-11
Payer: MEDICAID

## 2018-10-11 ENCOUNTER — NURSE TRIAGE (OUTPATIENT)
Dept: ADMINISTRATIVE | Facility: CLINIC | Age: 28
End: 2018-10-11

## 2018-10-11 ENCOUNTER — HOSPITAL ENCOUNTER (EMERGENCY)
Facility: HOSPITAL | Age: 28
Discharge: HOME | End: 2018-10-11
Attending: EMERGENCY MEDICINE
Payer: MEDICAID

## 2018-10-11 VITALS
HEIGHT: 69 IN | HEART RATE: 89 BPM | TEMPERATURE: 98 F | DIASTOLIC BLOOD PRESSURE: 84 MMHG | BODY MASS INDEX: 26.96 KG/M2 | WEIGHT: 182 LBS | SYSTOLIC BLOOD PRESSURE: 136 MMHG | OXYGEN SATURATION: 98 % | RESPIRATION RATE: 18 BRPM

## 2018-10-11 DIAGNOSIS — O09.899 HISTORY OF PRETERM DELIVERY, CURRENTLY PREGNANT: Primary | ICD-10-CM

## 2018-10-11 DIAGNOSIS — R11.2 NON-INTRACTABLE VOMITING WITH NAUSEA, UNSPECIFIED VOMITING TYPE: ICD-10-CM

## 2018-10-11 DIAGNOSIS — R05.9 COUGH: Primary | ICD-10-CM

## 2018-10-11 LAB
HCV LOG: 6.3 LOG (10) IU/ML
HCV RNA QUANT PCR: ABNORMAL IU/ML
HCV, QUALITATIVE: DETECTED IU/ML

## 2018-10-11 PROCEDURE — 99999 PR PBB SHADOW E&M-EST. PATIENT-LVL I: CPT | Mod: PBBFAC,,,

## 2018-10-11 PROCEDURE — 99283 EMERGENCY DEPT VISIT LOW MDM: CPT | Mod: 25,27

## 2018-10-11 PROCEDURE — 96372 THER/PROPH/DIAG INJ SC/IM: CPT | Mod: PBBFAC

## 2018-10-11 PROCEDURE — 99211 OFF/OP EST MAY X REQ PHY/QHP: CPT | Mod: PBBFAC,TH,25

## 2018-10-11 RX ORDER — PROMETHAZINE HYDROCHLORIDE 25 MG/1
25 TABLET ORAL EVERY 6 HOURS PRN
Qty: 15 TABLET | Refills: 0 | Status: SHIPPED | OUTPATIENT
Start: 2018-10-11 | End: 2018-10-26

## 2018-10-11 RX ADMIN — HYDROXYPROGESTERONE CAPROATE 250 MG: 250 INJECTION INTRAMUSCULAR at 02:10

## 2018-10-11 NOTE — PROGRESS NOTES
"Fifi 250mg given IM to left ventrogluteal.  Pt tolerated well.  Pt waited in clinic 10-15 minutes after appointment for s/s of medication reaction, none noted.  FHTs: 132, (hecked per pt request.)  Pt's mother is with her today, requests doctor's excuse for pt for "next 3 days for rest."  Per Christine, excuse for today only.  VB, LPN  "

## 2018-10-11 NOTE — TELEPHONE ENCOUNTER
29 weeks pregnant.  Dpap shot a few days ago.  Works nights, threw up in the early morning,after during night shift, and had the same thing happen again this am.  Emesis looks like thick valeriano red color, not sure if it's blood, but is sure she hasn't eaten anything that color. No abdominal  Pain, no fever, no other symptoms.  She is not having nausea during the day when she's sleeping, but in the middle of the night, like 1am.  Not feeling the baby move this am.  No other symptoms    Reason for Disposition   Vomiting red blood or black (coffee ground) material    Protocols used: ST PREGNANCY - MORNING SICKNESS-A-OH

## 2018-10-11 NOTE — ED PROVIDER NOTES
"SCRIBE #1 NOTE: I, Rosario Neda, am scribing for, and in the presence of, Shadi Fierro MD. I have scribed the entire note.       History     Chief Complaint   Patient presents with    Vomiting     2 episodes of vomiting over the last couple of days. pt 29 weeks pregnant. denies abdominal cramping or vaginal bleeding. baby is moving fine. pt is convinced that it was blood she threw up.     Review of patient's allergies indicates:  No Known Allergies      History of Present Illness     HPI    10/11/2018, 1:11 PM  History obtained from the patient      History of Present Illness: Shruthi Riley is a 28 y.o. female patient who presents to the Emergency Department for evaluation of 2 episodes of N/V. Pt states she had to make herself vomit because "it was ready to come out." Pt suspects it is a stomach ulcer because of recent stress in her life. Symptoms are episodic and moderate in severity. No mitigating or exacerbating factors reported. No associated sxs. Patient denies any fever, chills, abd pain, CP, SOB, back pain, neck pain, and all other sxs at this time. No prior tx. Pt is pregnant with her 4th pregnancy. 2 live births and 1 miscarriage. Pt states she has not had sxs like these in previous pregnancies. No further complaints or concerns at this time.       Arrival mode: Personal vehicle      PCP: Primary Doctor No        Past Medical History:  History reviewed. No pertinent medical history.    Past Surgical History:  History reviewed. No pertinent surgical history.      Family History:  History reviewed. No pertinent family history.    Social History:  Social History     Tobacco Use    Smoking status: Unknown    Substance and Sexual Activity    Alcohol use: Unknown    Drug use: Unknown    Sexual activity: Unknown        Review of Systems     Review of Systems   Constitutional: Negative for chills, diaphoresis and fever.   HENT: Negative for congestion, rhinorrhea and sore throat.    Eyes: Negative " "for photophobia and visual disturbance.   Respiratory: Negative for cough and shortness of breath.    Cardiovascular: Negative for chest pain and leg swelling.   Gastrointestinal: Positive for nausea and vomiting. Negative for abdominal pain, blood in stool, constipation and diarrhea.   Genitourinary: Negative for dysuria, frequency and hematuria.   Musculoskeletal: Negative for back pain and neck pain.   Skin: Negative for rash and wound.   Neurological: Negative for dizziness, weakness, numbness and headaches.   Hematological: Does not bruise/bleed easily.        Physical Exam     Initial Vitals [10/11/18 1215]   BP Pulse Resp Temp SpO2   136/84 89 18 97.9 °F (36.6 °C) 98 %      MAP       --          Physical Exam  Nursing Notes and Vital Signs Reviewed.  Constitutional: Patient is in no acute distress. Well-developed and well-nourished. Pt is belching upon examination.  Head: Atraumatic. Normocephalic.  Eyes: PERRL. EOM intact. Conjunctivae are not pale. No scleral icterus.  ENT: Mucous membranes are moist. Oropharynx is clear and symmetric.    Neck: Supple. Full ROM. No lymphadenopathy.  Cardiovascular: Regular rate. Regular rhythm. No murmurs, rubs, or gallops. Distal pulses are 2+ and symmetric.  Pulmonary/Chest: No respiratory distress. Clear to auscultation bilaterally. No wheezing or rales.  Abdominal: Soft and non-distended.  There is no tenderness.  No rebound, guarding, or rigidity.  Musculoskeletal: Moves all extremities. No obvious deformities. No edema.  Skin: Warm and dry.  Neurological:  Alert, awake, and appropriate.  Normal speech.  No acute focal neurological deficits are appreciated.  Psychiatric: Normal affect. Good eye contact. Appropriate in content.     ED Course   Procedures  ED Vital Signs:  Vitals:    10/11/18 1215   BP: 136/84   Pulse: 89   Resp: 18   Temp: 97.9 °F (36.6 °C)   SpO2: 98%   Weight: 82.6 kg (182 lb)   Height: 5' 9" (1.753 m)                The Emergency Provider reviewed " the vital signs and test results, which are outlined above.     ED Discussion     1:15 PM: Initial assessment of pt. Discussed with pt all pertinent ED information and results. Discussed pt dx and plan of tx. Gave pt all f/u and return to the ED instructions. All questions and concerns were addressed at this time. Pt expresses understanding of information and instructions, and is comfortable with plan to discharge. Pt is stable for discharge.    I discussed with patient and/or family/caretaker that evaluation in the ED does not suggest any emergent or life threatening medical conditions requiring immediate intervention beyond what was provided in the ED, and I believe patient is safe for discharge.  Regardless, an unremarkable evaluation in the ED does not preclude the development or presence of a serious of life threatening condition. As such, patient was instructed to return immediately for any worsening or change in current symptoms.    ED Medication(s):  Medications - No data to display     Current Discharge Medication List      START taking these medications    Details   promethazine (PHENERGAN) 25 MG tablet Take 1 tablet (25 mg total) by mouth every 6 (six) hours as needed for Nausea.  Qty: 15 tablet, Refills: 0             Follow-up Information     [facility] In 2 days.    Contact information:  [address] [facility] [address] [phone]             ob/gyn.                    Medical Decision Making                 Scribe Attestation:   Scribe #1: I performed the above scribed service and the documentation accurately describes the services I performed. I attest to the accuracy of the note. 10/11/2018 1:11 PM    Attending:   Physician Attestation Statement for Scribe #1: I, Shadi Fierro MD, personally performed the services described in this documentation, as scribed by Rosario Red, in my presence, and it is both accurate and complete.           Clinical Impression        ICD-10-CM ICD-9-CM   1. Cough R05 786.2   2. Non-intractable vomiting with nausea, unspecified vomiting type R11.2 787.01       Disposition:   Disposition: Discharged  Condition: Stable               Shadi Fierro MD  10/11/18 7402

## 2018-10-17 ENCOUNTER — PATIENT MESSAGE (OUTPATIENT)
Dept: GASTROENTEROLOGY | Facility: CLINIC | Age: 28
End: 2018-10-17

## 2018-10-18 ENCOUNTER — CLINICAL SUPPORT (OUTPATIENT)
Dept: OBSTETRICS AND GYNECOLOGY | Facility: CLINIC | Age: 28
End: 2018-10-18
Payer: MEDICAID

## 2018-10-18 PROCEDURE — 96372 THER/PROPH/DIAG INJ SC/IM: CPT | Mod: PBBFAC

## 2018-10-18 RX ADMIN — HYDROXYPROGESTERONE CAPROATE 250 MG: 250 INJECTION INTRAMUSCULAR at 02:10

## 2018-10-18 NOTE — PROGRESS NOTES
Pt. Given [place] injection. Pt. Tolerated well. Instructed pt. To wait in clinic for 15 minutes. Pt. Voiced understanding.

## 2018-10-23 ENCOUNTER — PATIENT MESSAGE (OUTPATIENT)
Dept: GASTROENTEROLOGY | Facility: CLINIC | Age: 28
End: 2018-10-23

## 2018-10-26 ENCOUNTER — ROUTINE PRENATAL (OUTPATIENT)
Dept: OBSTETRICS AND GYNECOLOGY | Facility: CLINIC | Age: 28
End: 2018-10-26
Payer: MEDICAID

## 2018-10-26 VITALS
WEIGHT: 189.81 LBS | DIASTOLIC BLOOD PRESSURE: 52 MMHG | BODY MASS INDEX: 28.03 KG/M2 | SYSTOLIC BLOOD PRESSURE: 100 MMHG

## 2018-10-26 DIAGNOSIS — O09.212 HIGH RISK PREGNANCY DUE TO HISTORY OF PRETERM LABOR IN SECOND TRIMESTER: Primary | ICD-10-CM

## 2018-10-26 PROCEDURE — 99212 OFFICE O/P EST SF 10 MIN: CPT | Mod: PBBFAC,TH | Performed by: ADVANCED PRACTICE MIDWIFE

## 2018-10-26 PROCEDURE — 99213 OFFICE O/P EST LOW 20 MIN: CPT | Mod: TH,S$PBB,, | Performed by: ADVANCED PRACTICE MIDWIFE

## 2018-10-26 PROCEDURE — 99999 PR PBB SHADOW E&M-EST. PATIENT-LVL II: CPT | Mod: PBBFAC,,, | Performed by: ADVANCED PRACTICE MIDWIFE

## 2018-10-26 PROCEDURE — 96372 THER/PROPH/DIAG INJ SC/IM: CPT | Mod: PBBFAC

## 2018-10-26 RX ADMIN — HYDROXYPROGESTERONE CAPROATE 250 MG: 250 INJECTION INTRAMUSCULAR at 11:10

## 2018-10-26 NOTE — PATIENT INSTRUCTIONS
Kick Counts    Its normal to worry about your babys health. One way you can know your babys doing well is to record the babys movements once a day. This is called a kick count. Remember to take your kick count records to all your appointments with your healthcare provider.  How to count kicks  Here are tips for counting kicks:  · Choose a time when the baby is active, such as after a meal.   · Sit comfortably or lie on your side.   · The first time the baby moves, write down the time.   · Count each movement until the baby has moved 10 times. This can take from 20 minutes to 2 hours.   · Try to do it at the same time each day.  When to call your healthcare provider  Call your healthcare provider right away if you notice any of the following:  · Your baby moves fewer than 10 times in 2 hours while youre doing kick counts.  · Your baby moves much less often than on the days before.  · You have not felt your baby move all day.  Date Last Reviewed: 8/5/2015 © 2000-2017 [employer]. [address] [place] [address]. All rights reserved. This information is not intended as a substitute for professional medical care. Always follow your healthcare professional's instructions.        Pregnancy: Your Third Trimester Changes  As the baby grows, your body changes too. You may also see signs that your body is getting ready for labor. Be patient. Within a few more weeks, your baby will be born.    How you are changing  Your body is preparing for the birth of your baby. Some of the most common changes are listed below. If you have any questions or concerns, ask your healthcare provider:  · Youll gain more weight from fluids, extra blood, and fat deposits.  · Your breasts will grow as your body gets ready to feed the baby. They may be more tender. You may also notice a slight yellow or white discharge from the nipples.  · Discharge from your vagina may increase. This is normal.  · You might see some  skin color changes on your forehead, cheeks, or nose. Most of these will go away after you deliver.  How your baby is growing    Month 7  Your baby can open and close his or her eyes, and weighs around 4 pounds. If born prematurely (too early), your baby would likely survive with special care.   Month 8  Your baby is building up body fat, and weighs around 6 pounds.    Month 9  Your baby weighs nearly 7 pounds and is about 18 to 20 inches long. In other words, any day now...   Date Last Reviewed: 8/16/2015  © 6879-7324 [employer]. [address] [place] [address], [place] [address]. All rights reserved. This information is not intended as a substitute for professional medical care. Always follow your healthcare professional's instructions.        Adapting to Pregnancy: Third Trimester    Although common during pregnancy, some discomforts may seem worse in the final weeks. Simple lifestyle changes can help. Take care of yourself. And ask your partner to help out with small tasks.  Limiting leg problems  Ways to combat leg issues:  · Wear support hose all day.  · Avoid snug shoes and clothes that bind, like tight pants and socks with elastic tops.  · Sit with your feet and legs raised often.  Caring for your breasts  Tips to follow include:  · Wash with plain water. Avoid using harsh soaps or rubbing alcohol. They may cause dryness.  · Wear a nursing bra for extra support. It can also hide any leaks from your nipples.  Controlling hemorrhoids  Ways to avoid hemorrhoids include:  · Eat foods that are high in fiber. Also, exercise and drink enough fluids. This will reduce constipation and hemorrhoids.  · Sleep and nap on your side. This limits pressure on the veins of your rectum.  · Try not to stand or sit for long periods.  Controlling back pain  As your body changes during pregnancy, your back must work in new ways. Back pain is due to many causes. Physical changes in your body can strain your back and its  supporting muscles. Also, hormones (chemicals that carry messages throughout the body) increase during pregnancy. This can affect how your muscles and joints work together. All of these changes can lead to pain. Pain may be felt in the upper or lower back. Pain is also common in the pelvis. Some pregnant women have sciatica. This is pain caused by pressure on the sciatic nerve running down the back of the leg. Ask your healthcare provider for specific tips and exercises to help control your back pain.  Tips to help you rest  Good rest and sleep will help you feel better. Here are some ideas:  · Ask your partner to massage your shoulders, neck, or back.  · Limit the errands you do each day.  · Lie down in the afternoon or after work for a few minutes.  · Take a warm bath before you go to sleep.  · Drink warm milk or teas without caffeine.  · Avoid coffee, black tea, and cola.  Stopping heartburn  · Avoid spicy or acidic foods.  · Eat small amounts more often. Eat slowly. · Wait 2 hours after eating before lying down.  · Sleep with your upper body raised 6 inches.   Managing mood swings  Ways to manage mood swings include:  · Know that mood changes are normal.  · Exercise often, but get plenty of rest.  · Address any concerns and limit stress. Talking to your partner, other women, or your healthcare provider may help.  Dealing with urinary frequency  Tips to deal with having to urinate often include:  · Drink plenty of water all day. If you drink a lot in the evening, though, you may have to get up more in the night.  · Limit coffee, black tea, and cola.  Date Last Reviewed: 8/16/2015  © 0279-8846 [employer]. [address] [place] [address], [place] [address]. All rights reserved. This information is not intended as a substitute for professional medical care. Always follow your healthcare professional's instructions.

## 2018-10-26 NOTE — PROGRESS NOTES
[place] 250mg given IM to right ventrogluteal.  Pt tolerated well.  Pt advised to wait 10-15 minutes for s/s of medication reaction.  Appt made for next visit on 11/01/18 at 7:40am at [facility] location, per pt request.  Pt voiced understanding.  CYNDIE GUEVARA

## 2018-10-26 NOTE — PROGRESS NOTES
Presented today having missed appt yesterday  Passed gucose Dr Vernon- next appt Nov 1st CMP reassuring. Quant decreasing  Declines flu shot  Give Fifi today  US prior to next visit   Bottle  Not sure re birth control  Ultrasound and visit in 1 week    Coffective counseling sheet Protect Breastfeeding discussed with mother. Reinforced avoidence of artifical nipples and formula, unless medically indicated.  Encouraged mother to download Coffect[employer] mobile florentino if she has not already done so. Mother verbalizes understanding.

## 2018-10-29 ENCOUNTER — TELEPHONE (OUTPATIENT)
Dept: OBSTETRICS AND GYNECOLOGY | Facility: CLINIC | Age: 28
End: 2018-10-29

## 2018-10-29 ENCOUNTER — PATIENT MESSAGE (OUTPATIENT)
Dept: OBSTETRICS AND GYNECOLOGY | Facility: CLINIC | Age: 28
End: 2018-10-29

## 2018-10-29 NOTE — TELEPHONE ENCOUNTER
Unsure what call she missed; contacted other staff members and no one is sure what she is needing. Went over appointments with Pt. Pt verbalizes understanding. DS

## 2018-10-29 NOTE — TELEPHONE ENCOUNTER
----- Message from Yossi Mak sent at 10/29/2018  2:21 PM CDT -----  Contact: pt   States she's rtn nurses call and can be reached at [phone]//thanks/dbw

## 2018-10-30 ENCOUNTER — TELEPHONE (OUTPATIENT)
Dept: PHARMACY | Facility: CLINIC | Age: 28
End: 2018-10-30

## 2018-10-30 NOTE — TELEPHONE ENCOUNTER
Refill call for MAYO. Patient confirmed that they are in need of the refill. Will prepare to  to [facility] on 18 with patient consent copay $0 at 004. Address &  confirmed.Patient has 1 doses remaining. Patient has not started any new medications, no missed doses and no side effects. Patient taking the medication as directed by doctor. Patient does not have any questions or concerns at this time.

## 2018-11-01 ENCOUNTER — PROCEDURE VISIT (OUTPATIENT)
Dept: OBSTETRICS AND GYNECOLOGY | Facility: CLINIC | Age: 28
End: 2018-11-01
Payer: MEDICAID

## 2018-11-01 ENCOUNTER — OFFICE VISIT (OUTPATIENT)
Dept: GASTROENTEROLOGY | Facility: CLINIC | Age: 28
End: 2018-11-01
Payer: MEDICAID

## 2018-11-01 ENCOUNTER — ROUTINE PRENATAL (OUTPATIENT)
Dept: OBSTETRICS AND GYNECOLOGY | Facility: CLINIC | Age: 28
End: 2018-11-01
Payer: MEDICAID

## 2018-11-01 VITALS
BODY MASS INDEX: 27.67 KG/M2 | WEIGHT: 187.38 LBS | DIASTOLIC BLOOD PRESSURE: 62 MMHG | SYSTOLIC BLOOD PRESSURE: 112 MMHG

## 2018-11-01 VITALS
SYSTOLIC BLOOD PRESSURE: 112 MMHG | WEIGHT: 187.38 LBS | BODY MASS INDEX: 27.75 KG/M2 | HEART RATE: 80 BPM | DIASTOLIC BLOOD PRESSURE: 66 MMHG | HEIGHT: 69 IN

## 2018-11-01 DIAGNOSIS — Z87.440 HISTORY OF GBS (GROUP B STREPTOCOCCUS) UTI, CURRENTLY PREGNANT, SECOND TRIMESTER: Primary | ICD-10-CM

## 2018-11-01 DIAGNOSIS — O09.212 HIGH RISK PREGNANCY DUE TO HISTORY OF PRETERM LABOR IN SECOND TRIMESTER: ICD-10-CM

## 2018-11-01 DIAGNOSIS — K76.9 LIVER LESION: Primary | ICD-10-CM

## 2018-11-01 DIAGNOSIS — B18.2 CHRONIC HEPATITIS C WITHOUT HEPATIC COMA: ICD-10-CM

## 2018-11-01 DIAGNOSIS — Z87.51 HISTORY OF PRETERM DELIVERY: ICD-10-CM

## 2018-11-01 DIAGNOSIS — O09.892 HISTORY OF GBS (GROUP B STREPTOCOCCUS) UTI, CURRENTLY PREGNANT, SECOND TRIMESTER: Primary | ICD-10-CM

## 2018-11-01 PROCEDURE — 99999 PR PBB SHADOW E&M-EST. PATIENT-LVL II: CPT | Mod: PBBFAC,,, | Performed by: ADVANCED PRACTICE MIDWIFE

## 2018-11-01 PROCEDURE — 76816 OB US FOLLOW-UP PER FETUS: CPT | Mod: PBBFAC | Performed by: OBSTETRICS & GYNECOLOGY

## 2018-11-01 PROCEDURE — 99213 OFFICE O/P EST LOW 20 MIN: CPT | Mod: S$PBB,,, | Performed by: INTERNAL MEDICINE

## 2018-11-01 PROCEDURE — 76819 FETAL BIOPHYS PROFIL W/O NST: CPT | Mod: PBBFAC | Performed by: OBSTETRICS & GYNECOLOGY

## 2018-11-01 PROCEDURE — 76819 FETAL BIOPHYS PROFIL W/O NST: CPT | Mod: 26,S$PBB,, | Performed by: OBSTETRICS & GYNECOLOGY

## 2018-11-01 PROCEDURE — 99213 OFFICE O/P EST LOW 20 MIN: CPT | Mod: 25,TH,S$PBB, | Performed by: ADVANCED PRACTICE MIDWIFE

## 2018-11-01 PROCEDURE — 99999 PR PBB SHADOW E&M-EST. PATIENT-LVL III: CPT | Mod: PBBFAC,,, | Performed by: INTERNAL MEDICINE

## 2018-11-01 PROCEDURE — 99212 OFFICE O/P EST SF 10 MIN: CPT | Mod: PBBFAC,TH,25 | Performed by: ADVANCED PRACTICE MIDWIFE

## 2018-11-01 PROCEDURE — 99213 OFFICE O/P EST LOW 20 MIN: CPT | Mod: PBBFAC,25,27,PO | Performed by: INTERNAL MEDICINE

## 2018-11-01 PROCEDURE — 76816 OB US FOLLOW-UP PER FETUS: CPT | Mod: 26,S$PBB,, | Performed by: OBSTETRICS & GYNECOLOGY

## 2018-11-01 RX ADMIN — HYDROXYPROGESTERONE CAPROATE 250 MG: 250 INJECTION INTRAMUSCULAR at 08:11

## 2018-11-01 NOTE — NURSING
Pt. Received Fifi injection today. Pt. Tolerated well. Instructed pt. To wait in clinic for 15 minutes. Pt. Voiced understanding.

## 2018-11-01 NOTE — PROGRESS NOTES
US today EFW 4#13oz, all anatomy complete/normal, girl. Denies PTL s/s, soreness in groin area after work, reassured, comfort measures discussed. [place] today and weekly. Reviewed s/s of PTL, good FM. al

## 2018-11-01 NOTE — PROGRESS NOTES
Subjective:     Shruthi Riley is here for follow up of Hepatitis C (Referred by OB, accompained by mother, Sugar)      HPI  Since Shruthi Riley's last visit she denies any acute issues.  Her pregnancy has been going well without any complications.  Her liver tests had been significantly elevated which why she was asked return to clinic.  They have since fallen down to more of a typical range with chronic hepatitis-C infection.    No evidence of liver decompensation: no ascites, confusion or GI bleeding.      Review of Systems    Objective:     Physical Exam   Constitutional: She is oriented to person, place, and time. She appears well-developed and well-nourished. No distress.   HENT:   Head: Normocephalic and atraumatic.   Mouth/Throat: Oropharynx is clear and moist. No oropharyngeal exudate.   Eyes: Conjunctivae are normal. Pupils are equal, round, and reactive to light. Right eye exhibits no discharge. Left eye exhibits no discharge. No scleral icterus.   Pulmonary/Chest: Effort normal and breath sounds normal. No respiratory distress. She has no wheezes.   Abdominal: Soft. She exhibits no distension. There is no tenderness.   gravid   Musculoskeletal: She exhibits no edema.   Neurological: She is alert and oriented to person, place, and time.   Psychiatric: She has a normal mood and affect. Her behavior is normal.   Vitals reviewed.      US 7/2018  1. Increased echotexture of the liver suggesting diffuse fatty infiltration versus chronic liver disease.  Mildly hypoechoic 1.9 cm lesion within the right hepatic lobe.  Further evaluation with MRI is recommended.  .    Computed MELD-Na score unavailable. Necessary lab results were not found in the last year.  Computed MELD score unavailable. Necessary lab results were not found in the last year.    WBC   Date Value Ref Range Status   10/09/2018 13.71 (H) 3.90 - 12.70 K/uL Final     Hemoglobin   Date Value Ref Range Status   10/09/2018 11.1 (L) 12.0  - 16.0 g/dL Final     Hematocrit   Date Value Ref Range Status   10/09/2018 34.6 (L) 37.0 - 48.5 % Final     Platelets   Date Value Ref Range Status   10/09/2018 254 150 - 350 K/uL Final     BUN, Bld   Date Value Ref Range Status   06/01/2018 8 6 - 20 mg/dL Final     Creatinine   Date Value Ref Range Status   06/01/2018 0.7 0.5 - 1.4 mg/dL Final     Glucose   Date Value Ref Range Status   06/01/2018 118 (H) 70 - 110 mg/dL Final     Calcium   Date Value Ref Range Status   06/01/2018 9.2 8.7 - 10.5 mg/dL Final     Sodium   Date Value Ref Range Status   06/01/2018 138 136 - 145 mmol/L Final     Potassium   Date Value Ref Range Status   06/01/2018 3.6 3.5 - 5.1 mmol/L Final     Chloride   Date Value Ref Range Status   06/01/2018 106 95 - 110 mmol/L Final     AST   Date Value Ref Range Status   10/09/2018 31 10 - 40 U/L Final     ALT   Date Value Ref Range Status   10/09/2018 52 (H) 10 - 44 U/L Final     Alkaline Phosphatase   Date Value Ref Range Status   10/09/2018 93 55 - 135 U/L Final     Total Bilirubin   Date Value Ref Range Status   10/09/2018 0.3 0.1 - 1.0 mg/dL Final     Comment:     For infants and newborns, interpretation of results should be based  on gestational age, weight and in agreement with clinical  observations.  Premature Infant recommended reference ranges:  Up to 24 hours.............<8.0 mg/dL  Up to 48 hours............<12.0 mg/dL  3-5 days..................<15.0 mg/dL  6-29 days.................<15.0 mg/dL       Albumin   Date Value Ref Range Status   10/09/2018 2.9 (L) 3.5 - 5.2 g/dL Final     No results found for: INR      Assessment/Plan:     1. Liver lesion    2. Chronic hepatitis C without hepatic coma      Shruthi Riley is a 28 y.o. female withHepatitis C (Referred by OB, accompained by mother, Sugar)    Liver lesion-likely benign lesion  -Will eval further after delivery  -     AFP tumor marker; Future; Expected date: 11/01/2018  -     Basic metabolic panel; Future; Expected  date: 11/01/2018    Chronic hepatitis C without hepatic coma- liver tests have improved, low concern for any additional liver disease  -continue to monitor labs  -Will consider treatment after delivery  -     Hepatic function panel; Future; Expected date: 11/01/2018  -     Basic metabolic panel; Future; Expected date: 11/01/2018    Return to clinic in 3 months      Alda Vernon MD

## 2018-11-02 ENCOUNTER — TELEPHONE (OUTPATIENT)
Dept: OBSTETRICS AND GYNECOLOGY | Facility: CLINIC | Age: 28
End: 2018-11-02

## 2018-11-06 ENCOUNTER — PATIENT MESSAGE (OUTPATIENT)
Dept: OBSTETRICS AND GYNECOLOGY | Facility: CLINIC | Age: 28
End: 2018-11-06

## 2018-11-08 ENCOUNTER — CLINICAL SUPPORT (OUTPATIENT)
Dept: OBSTETRICS AND GYNECOLOGY | Facility: CLINIC | Age: 28
End: 2018-11-08
Payer: MEDICAID

## 2018-11-08 PROCEDURE — 96372 THER/PROPH/DIAG INJ SC/IM: CPT | Mod: PBBFAC

## 2018-11-08 RX ORDER — HYDROXYPROGESTERONE CAPROATE 250 MG/ML
250 INJECTION INTRAMUSCULAR WEEKLY
Status: COMPLETED | OUTPATIENT
Start: 2018-11-08 | End: 2018-11-15

## 2018-11-08 RX ADMIN — HYDROXYPROGESTERONE CAPROATE 250 MG: 250 INJECTION INTRAMUSCULAR at 09:11

## 2018-11-08 NOTE — PROGRESS NOTES
Two pt identifiers verified  pt notified to wait in clinic for 15 minutes after receiving injection, pt verbalized understanding  250mg of Fifi administered IM to pt's right ventrogluteal.   Pt tolerated well  Next injection scheduled.

## 2018-11-15 ENCOUNTER — ROUTINE PRENATAL (OUTPATIENT)
Dept: OBSTETRICS AND GYNECOLOGY | Facility: CLINIC | Age: 28
End: 2018-11-15
Payer: MEDICAID

## 2018-11-15 VITALS
SYSTOLIC BLOOD PRESSURE: 108 MMHG | DIASTOLIC BLOOD PRESSURE: 60 MMHG | BODY MASS INDEX: 28.29 KG/M2 | WEIGHT: 191.56 LBS

## 2018-11-15 DIAGNOSIS — O09.213 HIGH RISK PREGNANCY DUE TO HISTORY OF PRETERM LABOR IN THIRD TRIMESTER: Primary | ICD-10-CM

## 2018-11-15 DIAGNOSIS — Z3A.34 34 WEEKS GESTATION OF PREGNANCY: ICD-10-CM

## 2018-11-15 PROCEDURE — 99213 OFFICE O/P EST LOW 20 MIN: CPT | Mod: PBBFAC,TH | Performed by: MIDWIFE

## 2018-11-15 PROCEDURE — 99999 PR PBB SHADOW E&M-EST. PATIENT-LVL III: CPT | Mod: PBBFAC,,, | Performed by: MIDWIFE

## 2018-11-15 PROCEDURE — 99212 OFFICE O/P EST SF 10 MIN: CPT | Mod: TH,S$PBB,, | Performed by: MIDWIFE

## 2018-11-15 RX ADMIN — HYDROXYPROGESTERONE CAPROATE 250 MG: 250 INJECTION INTRAMUSCULAR at 02:11

## 2018-11-15 NOTE — NURSING
Patient identified using two patient identifiers. Allergies and prescriptions reviewed. Pt received a Fifi IM injection to the left ventrogluteal. Pt tolerated injection with no complaints. Pt was instructed to wait 15 minutes to monitor for S/S of side effects. Pt verbalized understanding. No S/S noted at this time.

## 2018-11-15 NOTE — PROGRESS NOTES
Doing well, good fm, discussed IUD after delivery, pt does not want an IUD would like to do OCP's, Fifi today, reviewed warning signs, GBS at NV, rtc 2 wks

## 2018-11-15 NOTE — PATIENT INSTRUCTIONS
Understanding  Labor  Going into labor before your 37th week of pregnancy is called  labor.  labor can cause your baby to be born too soon. This can lead to a number of health problems that may affect your baby.     Before labor, the cervix is thick and closed.       In  labor, the cervix begins to efface (thin) and dilate (open).      Symptoms of  labor  If you believe youre having  labor, get medical help right away. Contractions alone dont mean youre in  labor. What matters more are changes in your cervix (the lower end of the uterus). Symptoms of  labor include:  · Four or more contractions per hour  · Strong contractions  · Constant menstrual-like cramping  · Low-back pain  · Mucous or bloody vaginal discharge  · Bleeding or spotting in the second or third trimester  Evaluating  labor  Your healthcare provider will try to find out whether youre in  labor or whether youre just having contractions. He or she may watch you for a few hours. The following tests may be done:  · Pelvic exam to see if your cervix has effaced (thinned) and dilated (opened)  · Uterine activity monitoring to detect contractions  · Fetal monitoring to check the health of your baby  · Ultrasound to check your babys size and position  · Amniocentesis to check how mature your babys lungs are  Caring for yourself at home  If you have  contractions, but your cervix is still thick and closed, your healthcare provider may ask you to do the following at home:  · Drink plenty of water.  · Do fewer activities.  · Rest in bed on your side.  · Avoid intercourse and nipple stimulation.  When to call your healthcare provider  Call your healthcare provider if you notice any of these:  · Four or more contractions per hour  · Bag of water breaks  · Bleeding or spotting   If you need hospital care   labor often requires that you have hospital care and complete bed  rest. You may have an IV (intravenous) line to get fluids. You may be given pills or injections to help prevent contractions. Finally, you may receive medicine (corticosteroids) that helps your babys lungs mature more rapidly.  Are you at risk?  Any pregnant woman can have  labor. It may start for no reason. But these risk factors can increase your chances:  · Past  labor or past early birth  · Smoking, drug, or alcohol use during pregnancy  · Multiple fetuses (twins or more)  · Problems with the shape of the uterus  · Bleeding during the pregnancy  The dangers of  birth  A baby born too soon may have health problems. This is because the baby didnt have enough time to mature. Some of the risks for your baby include:  · Not breastfeeding or feeding well  · Having immature lungs  · Bleeding in the brain  · Dying  Reaching term  Your goal is to get as close to term as you can before giving birth. The closer you get to term, the higher your chance of having a healthy baby. Work with your healthcare provider. Together, you can take steps that may keep you from giving birth too early.  Date Last Reviewed: 2016 [employer]. [address] [place] [address]. All rights reserved. This information is not intended as a substitute for professional medical care. Always follow your healthcare professional's instructions.        Premature Labor    Premature labor ( labor) is when symptoms of labor occur before 37 weeks of pregnancy. (This is 3 weeks before your due date.) Premature labor can lead to premature delivery. This means giving birth to your baby early. Babies need at least 37 weeks of pregnancy for all the organs to develop normally. The earlier the delivery, the greater the risks to the baby.  In most cases, the cause of premature labor is unknown. But certain factors may make the problem more likely. These include:  · History of premature labor with  other pregnancies  · Smoking  · Alcohol or substance abuse  · Low pre-pregnancy weight or weight gain during pregnancy  · Short time period between pregnancies  · Being pregnant with twins, triplets, or more  · History of certain types of surgery on the cervix or uterus  · Having a short cervix  · Certain infections  There are a number of other risk factors. Ask your healthcare provider to help you understand the risk factors specific to your case. Then find out what you can do to control or reduce them.  Contractions are one of the main signs of premature labor. A contraction is different from cramping. It may feel painful and the belly (abdomen) may get hard. It can last from a few seconds to a few minutes. Some women may feel only a sense of pressure in the belly, thighs, rectum, or vagina. Some may feel only the hardening of the uterus without pain or pressure. Or there may be a constant pain the lower back, which spreads forward toward the belly.    Premature labor can often be treated with medicines. A hospital stay will likely be needed. If labor is stopped successfully and you and your baby are both healthy, you may be discharged to continue care at home.  Home care  · Ask your provider any questions you have. Be certain you understand how to care for yourself at home. Also follow all recommendations given by your healthcare providers.  · Learn the signs of premature labor. Watch for these signs when you get home.  · Limit or restrict activities as advised. This may include stopping certain physical activities and cutting back hours at work.  · Avoid doing any strenuous work. Ask family and friends for help with tasks and support at home, if needed.  · Dont smoke, drink alcohol, or use other harmful substances.  · Take steps to reduce stress.  · Report any unusual symptoms to your provider.  Follow-up care  Follow up with your healthcare provider, or as directed. Weekly visits with your provider may be  needed.  When to seek medical advice  Call your healthcare provider right away if any of these occur:  · Regular or frequent contractions, whether they are painful or not  · Pressure in the pelvis  · Pressure in the lower belly or mild cramping in your belly with or without diarrhea  · Constant low, dull backache  · Gush or slow leaking of water from your vagina  · Change in vaginal discharge (watery, mucus, or bloody)  · Any vaginal bleeding  · Decreased movement of your baby  Date Last Reviewed: 9/26/2015  © 0182-2662 [employer]. [address] [place] [address]. All rights reserved. This information is not intended as a substitute for professional medical care. Always follow your healthcare professional's instructions.        Kick Counts    Its normal to worry about your babys health. One way you can know your babys doing well is to record the babys movements once a day. This is called a kick count. Remember to take your kick count records to all your appointments with your healthcare provider.  How to count kicks  Here are tips for counting kicks:  · Choose a time when the baby is active, such as after a meal.   · Sit comfortably or lie on your side.   · The first time the baby moves, write down the time.   · Count each movement until the baby has moved 10 times. This can take from 20 minutes to 2 hours.   · Try to do it at the same time each day.  When to call your healthcare provider  Call your healthcare provider right away if you notice any of the following:  · Your baby moves fewer than 10 times in 2 hours while youre doing kick counts.  · Your baby moves much less often than on the days before.  · You have not felt your baby move all day.  Date Last Reviewed: 8/5/2015  © 5298-6385 [employer]. [address] [place] [address]. All rights reserved. This information is not intended as a substitute for professional medical care. Always follow your healthcare  professional's instructions.

## 2018-11-23 ENCOUNTER — ROUTINE PRENATAL (OUTPATIENT)
Dept: OBSTETRICS AND GYNECOLOGY | Facility: CLINIC | Age: 28
End: 2018-11-23
Payer: MEDICAID

## 2018-11-23 VITALS — SYSTOLIC BLOOD PRESSURE: 108 MMHG | DIASTOLIC BLOOD PRESSURE: 68 MMHG | BODY MASS INDEX: 28.1 KG/M2 | WEIGHT: 190.25 LBS

## 2018-11-23 DIAGNOSIS — O26.843 UTERINE SIZE DATE DISCREPANCY PREGNANCY, THIRD TRIMESTER: ICD-10-CM

## 2018-11-23 DIAGNOSIS — Z87.440 HISTORY OF GBS (GROUP B STREPTOCOCCUS) UTI, CURRENTLY PREGNANT, SECOND TRIMESTER: ICD-10-CM

## 2018-11-23 DIAGNOSIS — O09.213 HIGH RISK PREGNANCY DUE TO HISTORY OF PRETERM LABOR IN THIRD TRIMESTER: Primary | ICD-10-CM

## 2018-11-23 DIAGNOSIS — O09.892 HISTORY OF GBS (GROUP B STREPTOCOCCUS) UTI, CURRENTLY PREGNANT, SECOND TRIMESTER: ICD-10-CM

## 2018-11-23 PROCEDURE — 87081 CULTURE SCREEN ONLY: CPT

## 2018-11-23 PROCEDURE — 99213 OFFICE O/P EST LOW 20 MIN: CPT | Mod: TH,S$PBB,, | Performed by: ADVANCED PRACTICE MIDWIFE

## 2018-11-23 PROCEDURE — 99999 PR PBB SHADOW E&M-EST. PATIENT-LVL III: CPT | Mod: PBBFAC,,, | Performed by: ADVANCED PRACTICE MIDWIFE

## 2018-11-23 PROCEDURE — 99213 OFFICE O/P EST LOW 20 MIN: CPT | Mod: PBBFAC,TH | Performed by: ADVANCED PRACTICE MIDWIFE

## 2018-11-23 RX ADMIN — HYDROXYPROGESTERONE CAPROATE 250 MG: 250 INJECTION INTRAMUSCULAR at 01:11

## 2018-11-23 NOTE — PROGRESS NOTES
[place] 250mg given IM to right ventrogluteal.  Pt tolerated well.  Pt waited in clinic for s/s of medication reaction, none were noted.  Appt made for next visit on 11/29/2018 at 1pm at [facility] location, per pt request.  Pt voiced understanding.  CYNDIE GUEVARA

## 2018-11-23 NOTE — PROGRESS NOTES
Pt had UC yesterday, called LND, resolved after a bowel movement. States good FM today, reviewed s/s of labor. GBS collected, cx softening but thick. francois today informed pt last injection, pt states she was told would have one more. S<D although baby not low in pelvis, US at 32+ wks EFW 41%, US next visit to verify interval growth. al

## 2018-11-25 LAB — BACTERIA SPEC AEROBE CULT: NORMAL

## 2018-11-29 ENCOUNTER — ROUTINE PRENATAL (OUTPATIENT)
Dept: OBSTETRICS AND GYNECOLOGY | Facility: CLINIC | Age: 28
End: 2018-11-29
Payer: MEDICAID

## 2018-11-29 ENCOUNTER — PROCEDURE VISIT (OUTPATIENT)
Dept: OBSTETRICS AND GYNECOLOGY | Facility: CLINIC | Age: 28
End: 2018-11-29
Payer: MEDICAID

## 2018-11-29 VITALS
DIASTOLIC BLOOD PRESSURE: 60 MMHG | SYSTOLIC BLOOD PRESSURE: 118 MMHG | BODY MASS INDEX: 28.29 KG/M2 | WEIGHT: 191.56 LBS

## 2018-11-29 DIAGNOSIS — Z72.0 TOBACCO USE: ICD-10-CM

## 2018-11-29 DIAGNOSIS — O09.213 HIGH RISK PREGNANCY DUE TO HISTORY OF PRETERM LABOR IN THIRD TRIMESTER: Primary | ICD-10-CM

## 2018-11-29 DIAGNOSIS — O26.843 UTERINE SIZE DATE DISCREPANCY PREGNANCY, THIRD TRIMESTER: ICD-10-CM

## 2018-11-29 DIAGNOSIS — B18.2 CHRONIC HEPATITIS C WITHOUT HEPATIC COMA: ICD-10-CM

## 2018-11-29 PROCEDURE — 76819 FETAL BIOPHYS PROFIL W/O NST: CPT | Mod: PBBFAC | Performed by: OBSTETRICS & GYNECOLOGY

## 2018-11-29 PROCEDURE — 99213 OFFICE O/P EST LOW 20 MIN: CPT | Mod: PBBFAC,TH | Performed by: ADVANCED PRACTICE MIDWIFE

## 2018-11-29 PROCEDURE — 76816 OB US FOLLOW-UP PER FETUS: CPT | Mod: PBBFAC | Performed by: OBSTETRICS & GYNECOLOGY

## 2018-11-29 PROCEDURE — 99213 OFFICE O/P EST LOW 20 MIN: CPT | Mod: TH,S$PBB,, | Performed by: ADVANCED PRACTICE MIDWIFE

## 2018-11-29 PROCEDURE — 76816 OB US FOLLOW-UP PER FETUS: CPT | Mod: 26,S$PBB,, | Performed by: OBSTETRICS & GYNECOLOGY

## 2018-11-29 PROCEDURE — 99999 PR PBB SHADOW E&M-EST. PATIENT-LVL III: CPT | Mod: PBBFAC,,, | Performed by: ADVANCED PRACTICE MIDWIFE

## 2018-11-29 PROCEDURE — 76819 FETAL BIOPHYS PROFIL W/O NST: CPT | Mod: 26,S$PBB,, | Performed by: OBSTETRICS & GYNECOLOGY

## 2018-11-29 RX ORDER — PENICILLIN V POTASSIUM 500 MG/1
500 TABLET, FILM COATED ORAL
COMMUNITY
End: 2018-12-12

## 2018-11-29 RX ORDER — HYDROCODONE BITARTRATE AND ACETAMINOPHEN 5; 325 MG/1; MG/1
1 TABLET ORAL EVERY 6 HOURS PRN
COMMUNITY
End: 2018-12-12

## 2018-11-29 NOTE — PROGRESS NOTES
"US Posterior placenta, 3vc, MVP 3.6, ELB[place] 7.4cm EFW 6.15ozs 59% BPP 8/8- reassuring  Not sure if "leaking" x 2 days, difficult to get history.    Sterile speculum exam - negative pooling, negative Nitrazine, negative  [place],.    Normal appearing discharge.    Cervix is FT, posterior with no presenting part in the pelvis, ultrasound states vertex presentation but not appreciated on exam.    Labor precautions reviewed and kick counts with info provided    The skin of the suprapubic region was evaluated and appears clean and dry  Counseled the patient to shower daily and to wash this area with an antibacterial soap such as Dial daily.  Advised her to not shave the hair from this area from now until after delivery.  I also counseled the patient to place antibacterial hand soap in all her bathrooms and kitchen at home to help facilitate proper hand hygiene practices before and after delivery.    Coffective counseling sheet Build Your Team discussed with mother. Reinforced importance of early identification of support team including champion, OB provider, pediatrician and local community resources. Encouraged mother to download Co[employer] mobile florentino if she has not already done so.  Mother verbalizes understanding.  "

## 2018-11-29 NOTE — PATIENT INSTRUCTIONS

## 2018-11-30 ENCOUNTER — TELEPHONE (OUTPATIENT)
Dept: PHARMACY | Facility: CLINIC | Age: 28
End: 2018-11-30

## 2018-12-02 ENCOUNTER — HOSPITAL ENCOUNTER (OUTPATIENT)
Facility: HOSPITAL | Age: 28
Discharge: HOME | End: 2018-12-02
Attending: OBSTETRICS & GYNECOLOGY | Admitting: OBSTETRICS & GYNECOLOGY
Payer: MEDICAID

## 2018-12-02 VITALS — DIASTOLIC BLOOD PRESSURE: 70 MMHG | SYSTOLIC BLOOD PRESSURE: 118 MMHG | HEART RATE: 102 BPM | TEMPERATURE: 98 F

## 2018-12-02 DIAGNOSIS — N89.8 CLEAR VAGINAL DISCHARGE: ICD-10-CM

## 2018-12-02 PROCEDURE — 99213 OFFICE O/P EST LOW 20 MIN: CPT | Mod: 25,TH,, | Performed by: MIDWIFE

## 2018-12-02 PROCEDURE — 99211 OFF/OP EST MAY X REQ PHY/QHP: CPT | Mod: 25

## 2018-12-02 PROCEDURE — 59025 FETAL NON-STRESS TEST: CPT | Mod: 26,,, | Performed by: MIDWIFE

## 2018-12-02 PROCEDURE — 59025 FETAL NON-STRESS TEST: CPT

## 2018-12-02 NOTE — SUBJECTIVE & OBJECTIVE
Obstetric HPI:  Patient reports cold/sinus symptoms, ? Leaking, + intercourse this am,  active fetal movement, No vaginal bleeding , Yes loss of fluid     This pregnancy has been complicated by see problem list    Obstetric History       T1      L2     SAB0   TAB0   Ectopic0   Multiple0   Live Births2       # Outcome Date GA Lbr Kris/2nd Weight Sex Delivery Anes PTL Lv   4 Current            3  01/01/15 26w0d   U    FD   2 Term 10/10/13 40w0d  1.814 kg (4 lb) M Vag-Spont EPI  JEN   1  02/21/10 32w0d  3.289 kg (7 lb 4 oz) M Vag-Spont EPI  JEN        Past Medical History:   Diagnosis Date    Hepatitis C     Miscarriage      Past Surgical History:   Procedure Laterality Date    DILATION AND CURETTAGE OF UTERUS      TONSILLECTOMY         PTA Medications   Medication Sig    HYDROcodone-acetaminophen (NORCO) 5-325 mg per tablet Take 1 tablet by mouth every 6 (six) hours as needed for Pain.    penicillin v potassium (VEETID) 500 MG tablet Take 500 mg by mouth every 8 (eight) hours.    prenatal 25/iron fum/folic/dha (PRENATAL-1 ORAL) Take by mouth once daily.       Review of patient's allergies indicates:  No Known Allergies     Family History     Problem Relation (Age of Onset)    Heart defect Mother        Tobacco Use    Smoking status: Current Every Day Smoker     Packs/day: 1.00     Types: Cigarettes    Smokeless tobacco: Never Used   Substance and Sexual Activity    Alcohol use: No    Drug use: Yes     Comment: polysubstance abuser     Sexual activity: Yes     Partners: Male     Review of Systems   HENT: Positive for nasal congestion.    Respiratory: Positive for cough.    Genitourinary: Positive for vaginal discharge.   All other systems reviewed and are negative.     Objective:     Vital Signs (Most Recent):  Temp: 97.6 °F (36.4 °C) (18 1154)  Pulse: 102 (18 1154)  BP: 118/70 (18 1154) Vital Signs (24h Range):  Temp:  [97.6 °F (36.4 °C)] 97.6 °F (36.4  °C)  Pulse:  [102] 102  BP: (118)/(70) 118/70        There is no height or weight on file to calculate BMI.    FHT: Cat 1 (reassuring)  TOCO:  irregular    Physical Exam:   Constitutional: She is oriented to person, place, and time. She appears well-developed and well-nourished.    HENT:   Head: Normocephalic.     Neck: Normal range of motion.     Pulmonary/Chest: Effort normal.        Abdominal: Soft.     Genitourinary:   Genitourinary Comments: SE done, neg pooling, nitrazine equivocal, fern negative            Musculoskeletal: Normal range of motion.       Neurological: She is alert and oriented to person, place, and time.    Skin: Skin is warm and dry.    Psychiatric: She has a normal mood and affect. Her behavior is normal. Judgment and thought content normal.       Cervix:  Dilation:  deferred  Effacement:    Station:   Presentation:      Significant Labs:  Lab Results   Component Value Date    GROUPTRH O POS 05/03/2018    HEPBSAG Negative 05/03/2018    STREPBCULT No Group B Streptococcus isolated 11/23/2018       I have personallly reviewed all pertinent lab results from the last 24 hours.  Recent Lab Results     None

## 2018-12-02 NOTE — DISCHARGE SUMMARY
[facility] -   Obstetrics  Discharge Summary      Patient Name: Shruthi Riley  MRN: [id number]  Admission Date: 12/2/2018  Hospital Length of Stay: 0 days  Discharge Date and Time:  12/02/2018 1:11 PM  Attending Physician: Terri Bueno MD   Discharging Provider: Santana Oh CNM  Primary Care Provider: Primary Doctor No    HPI: Pt presented to unit w/ c/o cold symptoms and ? Leaking of fluid, + intercourse this am    * No surgery found *     Hospital Course:   Observe  CEFM/TOCO  SE--neg pooling, nitrazine equivocal, fern negative  D/C home        Final Active Diagnoses:    Diagnosis Date Noted POA    PRINCIPAL PROBLEM:  Clear vaginal discharge [N89.8] 12/02/2018 Yes      Problems Resolved During this Admission:        Labs: All labs within the past 24 hours have been reviewed        Immunizations     None          This patient has no babies on file.  Pending Diagnostic Studies:     None          Discharged Condition: good    Disposition: Home or Self Care    Follow Up:    Patient Instructions:      Diet Adult Regular     Notify your health care provider if you experience any of the following:  temperature >100.4     Notify your health care provider if you experience any of the following:  persistent nausea and vomiting or diarrhea     Notify your health care provider if you experience any of the following:  severe uncontrolled pain     Notify your health care provider if you experience any of the following:  severe persistent headache     Activity as tolerated     Medications:  Current Discharge Medication List      CONTINUE these medications which have NOT CHANGED    Details   HYDROcodone-acetaminophen (NORCO) 5-325 mg per tablet Take 1 tablet by mouth every 6 (six) hours as needed for Pain.      penicillin v potassium (VEETID) 500 MG tablet Take 500 mg by mouth every 8 (eight) hours.      prenatal 25/iron fum/folic/dha (PRENATAL-1 ORAL) Take by mouth once daily.             Santana KABA  OPAL Oh  Obstetrics  [facility]

## 2018-12-02 NOTE — H&P
[facility] -   Obstetrics  History & Physical    Patient Name: Shruthi Riley  MRN: [id number]  Admission Date: 2018  Primary Care Provider: Primary Doctor No    Subjective:     Principal Problem:Clear vaginal discharge    History of Present Illness:  Pt presented to unit w/ c/o cold symptoms and ? Leaking of fluid, + intercourse this am    Obstetric HPI:  Patient reports cold/sinus symptoms, ? Leaking, + intercourse this am,  active fetal movement, No vaginal bleeding , Yes loss of fluid     This pregnancy has been complicated by see problem list    Obstetric History       T1      L2     SAB0   TAB0   Ectopic0   Multiple0   Live Births2       # Outcome Date GA Lbr Kris/2nd Weight Sex Delivery Anes PTL Lv   4 Current            3  01/01/15 26w0d   U    FD   2 Term 10/10/13 40w0d  1.814 kg (4 lb) M Vag-Spont EPI  JEN   1  02/21/10 32w0d  3.289 kg (7 lb 4 oz) M Vag-Spont EPI  JEN        Past Medical History:   Diagnosis Date    Hepatitis C     Miscarriage      Past Surgical History:   Procedure Laterality Date    DILATION AND CURETTAGE OF UTERUS      TONSILLECTOMY         PTA Medications   Medication Sig    HYDROcodone-acetaminophen (NORCO) 5-325 mg per tablet Take 1 tablet by mouth every 6 (six) hours as needed for Pain.    penicillin v potassium (VEETID) 500 MG tablet Take 500 mg by mouth every 8 (eight) hours.    prenatal 25/iron fum/folic/dha (PRENATAL-1 ORAL) Take by mouth once daily.       Review of patient's allergies indicates:  No Known Allergies     Family History     Problem Relation (Age of Onset)    Heart defect Mother        Tobacco Use    Smoking status: Current Every Day Smoker     Packs/day: 1.00     Types: Cigarettes    Smokeless tobacco: Never Used   Substance and Sexual Activity    Alcohol use: No    Drug use: Yes     Comment: polysubstance abuser     Sexual activity: Yes     Partners: Male     Review of Systems   HENT: Positive for nasal  congestion.    Respiratory: Positive for cough.    Genitourinary: Positive for vaginal discharge.   All other systems reviewed and are negative.     Objective:     Vital Signs (Most Recent):  Temp: 97.6 °F (36.4 °C) (18 1154)  Pulse: 102 (18 1154)  BP: 118/70 (18 1154) Vital Signs (24h Range):  Temp:  [97.6 °F (36.4 °C)] 97.6 °F (36.4 °C)  Pulse:  [102] 102  BP: (118)/(70) 118/70        There is no height or weight on file to calculate BMI.    FHT: Cat 1 (reassuring)  TOCO:  irregular    Physical Exam:   Constitutional: She is oriented to person, place, and time. She appears well-developed and well-nourished.    HENT:   Head: Normocephalic.     Neck: Normal range of motion.     Pulmonary/Chest: Effort normal.        Abdominal: Soft.     Genitourinary:   Genitourinary Comments: SE done, neg pooling, nitrazine equivocal, fern negative            Musculoskeletal: Normal range of motion.       Neurological: She is alert and oriented to person, place, and time.    Skin: Skin is warm and dry.    Psychiatric: She has a normal mood and affect. Her behavior is normal. Judgment and thought content normal.       Cervix:  Dilation:  deferred  Effacement:    Station:   Presentation:      Significant Labs:  Lab Results   Component Value Date    GROUPTRH O POS 2018    HEPBSAG Negative 2018    STREPBCULT No Group B Streptococcus isolated 2018       I have personallly reviewed all pertinent lab results from the last 24 hours.  Recent Lab Results     None        Assessment/Plan:     28 y.o. female  at 36w6d for:    * Clear vaginal discharge    Observe  NST  SE done, neg nitrazine, neg pooling, neg fern         Santana Oh, [facility]  Obstetrics  [facility]

## 2018-12-02 NOTE — DISCHARGE INSTRUCTIONS

## 2018-12-02 NOTE — PROCEDURES
Shruthi Riley is a 28 y.o. female patient.    Temp: 97.6 °F (36.4 °C) (12/02/18 1154)  Pulse: 102 (12/02/18 1154)  BP: 118/70 (12/02/18 1154)       Obtain Fetal nonstress test (NST)  Date/Time: 12/2/2018 1:12 PM  Performed by: Santana Oh CNM  Authorized by: Santana Oh CNM     Nonstress Test:     Variability:  6-25 BPM    Decelerations:  None    Accelerations:  15 bpm    Uterine Irritability: No      Contractions:  Irregular  Biophysical Profile:     Nonstress Test Interpretation: reactive      Overall Impression:  Reassuring  Post-procedure:     Patient tolerance:  Patient tolerated the procedure well with no immediate complications        Santana Oh  12/2/2018

## 2018-12-05 ENCOUNTER — ROUTINE PRENATAL (OUTPATIENT)
Dept: OBSTETRICS AND GYNECOLOGY | Facility: CLINIC | Age: 28
End: 2018-12-05
Payer: MEDICAID

## 2018-12-05 VITALS
DIASTOLIC BLOOD PRESSURE: 68 MMHG | BODY MASS INDEX: 28.75 KG/M2 | WEIGHT: 194.69 LBS | SYSTOLIC BLOOD PRESSURE: 110 MMHG

## 2018-12-05 DIAGNOSIS — O09.213 HIGH RISK PREGNANCY DUE TO HISTORY OF PRETERM LABOR IN THIRD TRIMESTER: Primary | ICD-10-CM

## 2018-12-05 PROBLEM — N89.8 CLEAR VAGINAL DISCHARGE: Status: RESOLVED | Noted: 2018-12-02 | Resolved: 2018-12-05

## 2018-12-05 PROCEDURE — 99999 PR PBB SHADOW E&M-EST. PATIENT-LVL II: CPT | Mod: PBBFAC,,, | Performed by: ADVANCED PRACTICE MIDWIFE

## 2018-12-05 PROCEDURE — 99213 OFFICE O/P EST LOW 20 MIN: CPT | Mod: TH,S$PBB,, | Performed by: ADVANCED PRACTICE MIDWIFE

## 2018-12-05 PROCEDURE — 99212 OFFICE O/P EST SF 10 MIN: CPT | Mod: PBBFAC,TH | Performed by: ADVANCED PRACTICE MIDWIFE

## 2018-12-05 NOTE — PROGRESS NOTES
Doing well, some vaginal pressure, denies LOF, VB, or contractions  Will treat for GBS!!!!!!!  Labor precautions and fetal movement reviewed, when to go to hospital   Stressed hydration   VE per pt request   S<D, if still < dates at nv, will do ultrasound

## 2018-12-12 ENCOUNTER — ROUTINE PRENATAL (OUTPATIENT)
Dept: OBSTETRICS AND GYNECOLOGY | Facility: CLINIC | Age: 28
End: 2018-12-12
Payer: MEDICAID

## 2018-12-12 VITALS
WEIGHT: 193.81 LBS | DIASTOLIC BLOOD PRESSURE: 60 MMHG | SYSTOLIC BLOOD PRESSURE: 112 MMHG | BODY MASS INDEX: 28.62 KG/M2

## 2018-12-12 DIAGNOSIS — O26.843 SIZE OF FETUS INCONSISTENT WITH DATES IN THIRD TRIMESTER: Primary | ICD-10-CM

## 2018-12-12 DIAGNOSIS — Z87.51 HISTORY OF PRETERM DELIVERY: ICD-10-CM

## 2018-12-12 DIAGNOSIS — R79.89 ELEVATED LIVER FUNCTION TESTS: ICD-10-CM

## 2018-12-12 DIAGNOSIS — O09.892 HISTORY OF GBS (GROUP B STREPTOCOCCUS) UTI, CURRENTLY PREGNANT, SECOND TRIMESTER: ICD-10-CM

## 2018-12-12 DIAGNOSIS — Z87.440 HISTORY OF GBS (GROUP B STREPTOCOCCUS) UTI, CURRENTLY PREGNANT, SECOND TRIMESTER: ICD-10-CM

## 2018-12-12 PROBLEM — O09.213 HIGH RISK PREGNANCY DUE TO HISTORY OF PRETERM LABOR IN THIRD TRIMESTER: Status: RESOLVED | Noted: 2018-06-01 | Resolved: 2018-12-12

## 2018-12-12 PROCEDURE — 99212 OFFICE O/P EST SF 10 MIN: CPT | Mod: PBBFAC,TH | Performed by: ADVANCED PRACTICE MIDWIFE

## 2018-12-12 PROCEDURE — 99213 OFFICE O/P EST LOW 20 MIN: CPT | Mod: TH,S$PBB,, | Performed by: ADVANCED PRACTICE MIDWIFE

## 2018-12-12 PROCEDURE — 99999 PR PBB SHADOW E&M-EST. PATIENT-LVL II: CPT | Mod: PBBFAC,,, | Performed by: ADVANCED PRACTICE MIDWIFE

## 2018-12-13 ENCOUNTER — HOSPITAL ENCOUNTER (OUTPATIENT)
Facility: HOSPITAL | Age: 28
Discharge: HOME | End: 2018-12-13
Attending: OBSTETRICS & GYNECOLOGY | Admitting: OBSTETRICS & GYNECOLOGY
Payer: MEDICAID

## 2018-12-13 DIAGNOSIS — O47.9 THREATENED LABOR AT TERM: ICD-10-CM

## 2018-12-13 LAB
BILIRUB UR QL STRIP: NEGATIVE
CLARITY UR: CLEAR
COLOR UR: YELLOW
GLUCOSE UR QL STRIP: NEGATIVE
HGB UR QL STRIP: NEGATIVE
KETONES UR QL STRIP: NEGATIVE
LEUKOCYTE ESTERASE UR QL STRIP: ABNORMAL
MICROSCOPIC COMMENT: NORMAL
NITRITE UR QL STRIP: NEGATIVE
PH UR STRIP: 7 [PH] (ref 5–8)
PROT UR QL STRIP: NEGATIVE
SP GR UR STRIP: 1.01 (ref 1–1.03)
URN SPEC COLLECT METH UR: ABNORMAL
UROBILINOGEN UR STRIP-ACNC: NEGATIVE EU/DL
WBC #/AREA URNS HPF: 1 /HPF (ref 0–5)

## 2018-12-13 PROCEDURE — 99211 OFF/OP EST MAY X REQ PHY/QHP: CPT | Mod: 25

## 2018-12-13 PROCEDURE — 59025 FETAL NON-STRESS TEST: CPT

## 2018-12-13 PROCEDURE — 81000 URINALYSIS NONAUTO W/SCOPE: CPT

## 2018-12-13 PROCEDURE — 59025 FETAL NON-STRESS TEST: CPT | Mod: 26,,, | Performed by: ADVANCED PRACTICE MIDWIFE

## 2018-12-13 PROCEDURE — 99213 OFFICE O/P EST LOW 20 MIN: CPT | Mod: 25,TH,, | Performed by: ADVANCED PRACTICE MIDWIFE

## 2018-12-13 RX ORDER — ONDANSETRON 8 MG/1
8 TABLET, ORALLY DISINTEGRATING ORAL EVERY 8 HOURS PRN
Status: DISCONTINUED | OUTPATIENT
Start: 2018-12-13 | End: 2018-12-13 | Stop reason: HOSPADM

## 2018-12-13 RX ORDER — ACETAMINOPHEN 500 MG
500 TABLET ORAL EVERY 6 HOURS PRN
Status: DISCONTINUED | OUTPATIENT
Start: 2018-12-13 | End: 2018-12-13 | Stop reason: HOSPADM

## 2018-12-13 NOTE — SUBJECTIVE & OBJECTIVE
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  Tobacco use    Obstetric History       T1      L2     SAB0   TAB0   Ectopic0   Multiple0   Live Births2       # Outcome Date GA Lbr Kris/2nd Weight Sex Delivery Anes PTL Lv   4 Current            3  01/01/15 26w0d   U    FD   2 Term 10/10/13 40w0d  1.814 kg (4 lb) M Vag-Spont EPI  JEN   1  02/21/10 32w0d  3.289 kg (7 lb 4 oz) M Vag-Spont EPI  JEN        Past Medical History:   Diagnosis Date    Hepatitis C     Miscarriage      Past Surgical History:   Procedure Laterality Date    DILATION AND CURETTAGE OF UTERUS      TONSILLECTOMY         PTA Medications   Medication Sig    prenatal 25/iron fum/folic/dha (PRENATAL-1 ORAL) Take by mouth once daily.       Review of patient's allergies indicates:  No Known Allergies     Family History     Problem Relation (Age of Onset)    Heart defect Mother        Tobacco Use    Smoking status: Current Every Day Smoker     Packs/day: 1.00     Types: Cigarettes    Smokeless tobacco: Never Used   Substance and Sexual Activity    Alcohol use: No    Drug use: Yes     Comment: polysubstance abuser     Sexual activity: Yes     Partners: Male     Review of Systems   Respiratory: Negative for shortness of breath.    Cardiovascular: Negative for chest pain.   Gastrointestinal: Negative for nausea and vomiting.   Genitourinary: Negative for vaginal bleeding and vaginal discharge.   Neurological: Negative for headaches.   All other systems reviewed and are negative.     Objective:     Vital Signs (Most Recent):    Vital Signs (24h Range):  Temp:  [98.1 °F (36.7 °C)] 98.1 °F (36.7 °C)  Pulse:  [84]  84  Resp:  [22] 22  SpO2:  [100 %] 100 %  BP: (123)/(79) 123/79        There is no height or weight on file to calculate BMI.    FHT: 125 Cat 1 (reassuring)  TOCO:  Q 2-10 minutes    Physical Exam:   Constitutional: She is oriented to person, place, and time. She appears well-developed and well-nourished.    HENT:   Head: Normocephalic.     Neck: Normal range of motion.    Cardiovascular: Normal rate and regular rhythm.     Pulmonary/Chest: Effort normal.        Abdominal: Soft.     Genitourinary: Vagina normal and uterus normal.           Musculoskeletal: Normal range of motion and moves all extremeties.       Neurological: She is alert and oriented to person, place, and time. She has normal reflexes.    Skin: Skin is warm and dry.        Cervix: per RN   Dilation:  2  Effacement:  75%  Station: -3  Presentation: Vertex     Significant Labs:  Lab Results   Component Value Date    GROUPTRH O POS 05/03/2018    HEPBSAG Negative 05/03/2018    STREPBCULT No Group B Streptococcus isolated 11/23/2018       I have personallly reviewed all pertinent lab results from the last 24 hours.

## 2018-12-13 NOTE — H&P
[facility] -   Obstetrics  History & Physical    Patient Name: Shruthi Riley  MRN: [id number]  Admission Date: 2018  Primary Care Provider: Primary Doctor No    Subjective:     Principal Problem:Threatened labor at term    History of Present Illness:  28 y.o.  at 38w3d presents to hospital with complaints of vaginal pressure and contractions.     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  Tobacco use    Obstetric History       T1      L2     SAB0   TAB0   Ectopic0   Multiple0   Live Births2       # Outcome Date GA Lbr Kris/2nd Weight Sex Delivery Anes PTL Lv   4 Current            3  01/01/15 26w0d   U    FD   2 Term 10/10/13 40w0d  1.814 kg (4 lb) M Vag-Spont EPI  JEN   1  02/21/10 32w0d  3.289 kg (7 lb 4 oz) M Vag-Spont EPI  JEN        Past Medical History:   Diagnosis Date    Hepatitis C     Miscarriage      Past Surgical History:   Procedure Laterality Date    DILATION AND CURETTAGE OF UTERUS      TONSILLECTOMY         PTA Medications   Medication Sig    prenatal 25/iron fum/folic/dha (PRENATAL-1 ORAL) Take by mouth once daily.       Review of patient's allergies indicates:  No Known Allergies     Family History     Problem Relation (Age of Onset)    Heart defect Mother        Tobacco Use    Smoking status: Current Every Day Smoker     Packs/day: 1.00     Types: Cigarettes    Smokeless tobacco: Never Used   Substance and Sexual Activity    Alcohol use: No    Drug use: Yes     Comment: polysubstance abuser     Sexual activity: Yes     Partners: Male     Review of Systems   Respiratory: Negative for shortness of breath.     Cardiovascular: Negative for chest pain.   Gastrointestinal: Negative for nausea and vomiting.   Genitourinary: Negative for vaginal bleeding and vaginal discharge.   Neurological: Negative for headaches.   All other systems reviewed and are negative.     Objective:     Vital Signs (Most Recent):    Vital Signs (24h Range):  Temp:  [98.1 °F (36.7 °C)] 98.1 °F (36.7 °C)  Pulse:  [84] 84  Resp:  [22] 22  SpO2:  [100 %] 100 %  BP: (123)/(79) 123/79        There is no height or weight on file to calculate BMI.    FHT: 125 Cat 1 (reassuring)  TOCO:  Q 2-10 minutes    Physical Exam:   Constitutional: She is oriented to person, place, and time. She appears well-developed and well-nourished.    HENT:   Head: Normocephalic.     Neck: Normal range of motion.    Cardiovascular: Normal rate and regular rhythm.     Pulmonary/Chest: Effort normal.        Abdominal: Soft.     Genitourinary: Vagina normal and uterus normal.           Musculoskeletal: Normal range of motion and moves all extremeties.       Neurological: She is alert and oriented to person, place, and time. She has normal reflexes.    Skin: Skin is warm and dry.        Cervix: per RN   Dilation:  2  Effacement:  75%  Station: -3  Presentation: Vertex     Significant Labs:  Lab Results   Component Value Date    GROUPTRH O POS 2018    HEPBSAG Negative 2018    STREPBCULT No Group B Streptococcus isolated 2018       I have personallly reviewed all pertinent lab results from the last 24 hours.    Assessment/Plan:     28 y.o. female  at 38w3d for:    * Threatened labor at term    Monitor, SVE, NST, UA, oral hydration         Ignacia Nair CNM  Obstetrics  [facility]

## 2018-12-13 NOTE — PROCEDURES
Shruthi Riley is a 28 y.o. female patient.            Obtain Fetal nonstress test (NST)  Date/Time: 12/13/2018 5:58 PM  Performed by: Ignacia Nair CNM  Authorized by: Ignacia Nair CNM     Nonstress Test:     Variability:  6-25 BPM    Decelerations:  None    Accelerations:  15 bpm    Acoustic Stimulator: No      Baseline:  125    Uterine Irritability: No      Contractions:  Irregular    Contraction Frequency:  2-10  Biophysical Profile:     Nonstress Test Interpretation: reactive      Overall Impression:  Non - reassuring  Post-procedure:     Patient tolerance:  Patient tolerated the procedure well with no immediate complications        Ignacia Nair  12/13/2018

## 2018-12-14 NOTE — DISCHARGE INSTRUCTIONS

## 2018-12-14 NOTE — DISCHARGE SUMMARY
[facility]  Obstetrics  Discharge Summary      Patient Name: Shruthi Riley  MRN: [id number]  Admission Date: 2018  Hospital Length of Stay: 0 days  Discharge Date and Time: No discharge date for patient encounter.  Attending Physician: Terri Bueno MD   Discharging Provider: Ignacia Nair CNM  Primary Care Provider: Primary Doctor No    HPI: 28 y.o.  at 38w3d presents to hospital with complaints of vaginal pressure and contractions.     * No surgery found *     Hospital Course:   NST reactive. SVE remains the same.         Final Active Diagnoses:      Problems Resolved During this Admission:    Diagnosis Date Noted Date Resolved POA    PRINCIPAL PROBLEM:  Threatened labor at term [O47.9] 2018 Yes        Labs: All labs within the past 24 hours have been reviewed    Immunizations     None          This patient has no babies on file.  Pending Diagnostic Studies:     None          Discharged Condition: good    Disposition:     Follow Up:    Patient Instructions:   No discharge procedures on file.  Medications:  Current Discharge Medication List      CONTINUE these medications which have NOT CHANGED    Details   prenatal 25/iron fum/folic/dha (PRENATAL-1 ORAL) Take by mouth once daily.             Ignacia Nair CNM  Obstetrics  [facility]

## 2018-12-19 ENCOUNTER — ROUTINE PRENATAL (OUTPATIENT)
Dept: OBSTETRICS AND GYNECOLOGY | Facility: CLINIC | Age: 28
End: 2018-12-19
Payer: MEDICAID

## 2018-12-19 ENCOUNTER — PROCEDURE VISIT (OUTPATIENT)
Dept: OBSTETRICS AND GYNECOLOGY | Facility: CLINIC | Age: 28
End: 2018-12-19
Payer: MEDICAID

## 2018-12-19 VITALS
SYSTOLIC BLOOD PRESSURE: 118 MMHG | BODY MASS INDEX: 28.58 KG/M2 | DIASTOLIC BLOOD PRESSURE: 64 MMHG | WEIGHT: 193.56 LBS

## 2018-12-19 DIAGNOSIS — O09.93 SUPERVISION OF HIGH RISK PREGNANCY IN THIRD TRIMESTER: Primary | ICD-10-CM

## 2018-12-19 DIAGNOSIS — O26.843 SIZE OF FETUS INCONSISTENT WITH DATES IN THIRD TRIMESTER: ICD-10-CM

## 2018-12-19 PROCEDURE — 76819 FETAL BIOPHYS PROFIL W/O NST: CPT | Mod: 59,PBBFAC | Performed by: OBSTETRICS & GYNECOLOGY

## 2018-12-19 PROCEDURE — 99999 PR PBB SHADOW E&M-EST. PATIENT-LVL II: CPT | Mod: PBBFAC,,, | Performed by: ADVANCED PRACTICE MIDWIFE

## 2018-12-19 PROCEDURE — 76816 OB US FOLLOW-UP PER FETUS: CPT | Mod: 26,59,S$PBB, | Performed by: OBSTETRICS & GYNECOLOGY

## 2018-12-19 PROCEDURE — 99213 OFFICE O/P EST LOW 20 MIN: CPT | Mod: TH,S$PBB,, | Performed by: ADVANCED PRACTICE MIDWIFE

## 2018-12-19 PROCEDURE — 76819 FETAL BIOPHYS PROFIL W/O NST: CPT | Mod: 26,59,S$PBB, | Performed by: OBSTETRICS & GYNECOLOGY

## 2018-12-19 PROCEDURE — 99212 OFFICE O/P EST SF 10 MIN: CPT | Mod: PBBFAC,TH,25 | Performed by: ADVANCED PRACTICE MIDWIFE

## 2018-12-19 PROCEDURE — 76816 OB US FOLLOW-UP PER FETUS: CPT | Mod: 59,PBBFAC | Performed by: OBSTETRICS & GYNECOLOGY

## 2018-12-19 NOTE — PROGRESS NOTES
Doing well, BHCs but nothing consistent  VE per pt request, membrane sweep per request   GBS negative, pt aware   Labor precautions and fetal movement reviewed, when to go to hospital   TREAT GBS WHEN IN LABOR  Ultrasound today with cephalic presentation, posterior, 3 vessel cord, MVP 6.3cm, ELBERT 12.9cm, EFW 66%, 7lb 15oz, BPP 8/8, reviewed with pt and her

## 2018-12-26 ENCOUNTER — ROUTINE PRENATAL (OUTPATIENT)
Dept: OBSTETRICS AND GYNECOLOGY | Facility: CLINIC | Age: 28
End: 2018-12-26
Payer: MEDICAID

## 2018-12-26 VITALS — BODY MASS INDEX: 28.94 KG/M2 | SYSTOLIC BLOOD PRESSURE: 114 MMHG | WEIGHT: 196 LBS | DIASTOLIC BLOOD PRESSURE: 66 MMHG

## 2018-12-26 DIAGNOSIS — O48.0 POST TERM PREGNANCY OVER 40 WEEKS: Primary | ICD-10-CM

## 2018-12-26 PROCEDURE — 99999 PR PBB SHADOW E&M-EST. PATIENT-LVL II: CPT | Mod: PBBFAC,,, | Performed by: ADVANCED PRACTICE MIDWIFE

## 2018-12-26 PROCEDURE — 99212 OFFICE O/P EST SF 10 MIN: CPT | Mod: TH,S$PBB,, | Performed by: ADVANCED PRACTICE MIDWIFE

## 2018-12-26 PROCEDURE — 99212 OFFICE O/P EST SF 10 MIN: CPT | Mod: PBBFAC,TH | Performed by: ADVANCED PRACTICE MIDWIFE

## 2018-12-26 RX ORDER — SODIUM CHLORIDE 9 MG/ML
INJECTION, SOLUTION INTRAVENOUS
Status: CANCELLED | OUTPATIENT
Start: 2018-12-26

## 2018-12-26 RX ORDER — SODIUM CHLORIDE, SODIUM LACTATE, POTASSIUM CHLORIDE, CALCIUM CHLORIDE 600; 310; 30; 20 MG/100ML; MG/100ML; MG/100ML; MG/100ML
INJECTION, SOLUTION INTRAVENOUS CONTINUOUS
Status: CANCELLED | OUTPATIENT
Start: 2018-12-26

## 2018-12-26 RX ORDER — MISOPROSTOL 100 UG/1
600 TABLET ORAL
Status: CANCELLED | OUTPATIENT
Start: 2018-12-26

## 2018-12-26 RX ORDER — ONDANSETRON 4 MG/1
8 TABLET, ORALLY DISINTEGRATING ORAL EVERY 8 HOURS PRN
Status: CANCELLED | OUTPATIENT
Start: 2018-12-26

## 2018-12-26 NOTE — PROGRESS NOTES
Doing well, is tired reports fm and denies lof vb and occ ctx   Requesting ve  And cervical sweep done pt tolerated well  Prev baby born with GSB , will tx in labor   IOL 12/27 @0500 pitocin   Labor precautions reviewed and when to go to ldr

## 2018-12-27 ENCOUNTER — HOSPITAL ENCOUNTER (INPATIENT)
Facility: HOSPITAL | Age: 28
LOS: 2 days | Discharge: HOME | End: 2018-12-29
Attending: OBSTETRICS & GYNECOLOGY | Admitting: OBSTETRICS & GYNECOLOGY
Payer: MEDICAID

## 2018-12-27 ENCOUNTER — ANESTHESIA (OUTPATIENT)
Dept: OBSTETRICS AND GYNECOLOGY | Facility: HOSPITAL | Age: 28
End: 2018-12-27
Payer: MEDICAID

## 2018-12-27 ENCOUNTER — ANESTHESIA EVENT (OUTPATIENT)
Dept: OBSTETRICS AND GYNECOLOGY | Facility: HOSPITAL | Age: 28
End: 2018-12-27
Payer: MEDICAID

## 2018-12-27 DIAGNOSIS — O48.0 POST TERM PREGNANCY OVER 40 WEEKS: ICD-10-CM

## 2018-12-27 LAB
ABO + RH BLD: NORMAL
AMPHET+METHAMPHET UR QL: NEGATIVE
BARBITURATES UR QL SCN>200 NG/ML: NEGATIVE
BASOPHILS # BLD AUTO: 0.03 K/UL
BASOPHILS NFR BLD: 0.2 %
BENZODIAZ UR QL SCN>200 NG/ML: NEGATIVE
BLD GP AB SCN CELLS X3 SERPL QL: NORMAL
BZE UR QL SCN: NEGATIVE
CANNABINOIDS UR QL SCN: NEGATIVE
CREAT UR-MCNC: 120.8 MG/DL
DIFFERENTIAL METHOD: ABNORMAL
EOSINOPHIL # BLD AUTO: 0.2 K/UL
EOSINOPHIL NFR BLD: 1.5 %
ERYTHROCYTE [DISTWIDTH] IN BLOOD BY AUTOMATED COUNT: 13.8 %
HCT VFR BLD AUTO: 33.7 %
HGB BLD-MCNC: 11.2 G/DL
LYMPHOCYTES # BLD AUTO: 3.7 K/UL
LYMPHOCYTES NFR BLD: 26.9 %
MCH RBC QN AUTO: 30.6 PG
MCHC RBC AUTO-ENTMCNC: 33.2 G/DL
MCV RBC AUTO: 92 FL
METHADONE UR QL SCN>300 NG/ML: NEGATIVE
MONOCYTES # BLD AUTO: 1.3 K/UL
MONOCYTES NFR BLD: 9.3 %
NEUTROPHILS # BLD AUTO: 8.4 K/UL
NEUTROPHILS NFR BLD: 62.1 %
OPIATES UR QL SCN: NEGATIVE
PCP UR QL SCN>25 NG/ML: NEGATIVE
PLATELET # BLD AUTO: 260 K/UL
PMV BLD AUTO: 9.8 FL
RBC # BLD AUTO: 3.66 M/UL
TOXICOLOGY INFORMATION: NORMAL
WBC # BLD AUTO: 13.58 K/UL

## 2018-12-27 PROCEDURE — 85025 COMPLETE CBC W/AUTO DIFF WBC: CPT

## 2018-12-27 PROCEDURE — 59409 OBSTETRICAL CARE: CPT | Mod: GB,,, | Performed by: ADVANCED PRACTICE MIDWIFE

## 2018-12-27 PROCEDURE — 72100002 HC LABOR CARE, 1ST 8 HOURS

## 2018-12-27 PROCEDURE — 63600175 PHARM REV CODE 636 W HCPCS: Performed by: NURSE ANESTHETIST, CERTIFIED REGISTERED

## 2018-12-27 PROCEDURE — 62326 NJX INTERLAMINAR LMBR/SAC: CPT | Performed by: NURSE ANESTHETIST, CERTIFIED REGISTERED

## 2018-12-27 PROCEDURE — 27800517 HC TRAY,EPIDURAL-CONTINUOUS: Performed by: NURSE ANESTHETIST, CERTIFIED REGISTERED

## 2018-12-27 PROCEDURE — 51701 INSERT BLADDER CATHETER: CPT

## 2018-12-27 PROCEDURE — 59409 PR OBSTETRICAL CARE,VAG DELIV ONLY: ICD-10-PCS | Mod: GB,,, | Performed by: ADVANCED PRACTICE MIDWIFE

## 2018-12-27 PROCEDURE — 25000003 PHARM REV CODE 250: Performed by: ADVANCED PRACTICE MIDWIFE

## 2018-12-27 PROCEDURE — 86850 RBC ANTIBODY SCREEN: CPT

## 2018-12-27 PROCEDURE — 80307 DRUG TEST PRSMV CHEM ANLYZR: CPT

## 2018-12-27 PROCEDURE — 72100003 HC LABOR CARE, EA. ADDL. 8 HRS

## 2018-12-27 PROCEDURE — 72200005 HC VAGINAL DELIVERY LEVEL II

## 2018-12-27 PROCEDURE — 11000001 HC ACUTE MED/SURG PRIVATE ROOM

## 2018-12-27 PROCEDURE — 99211 OFF/OP EST MAY X REQ PHY/QHP: CPT

## 2018-12-27 PROCEDURE — 63600175 PHARM REV CODE 636 W HCPCS: Performed by: ADVANCED PRACTICE MIDWIFE

## 2018-12-27 PROCEDURE — 25000003 PHARM REV CODE 250: Performed by: NURSE ANESTHETIST, CERTIFIED REGISTERED

## 2018-12-27 PROCEDURE — S4991 NICOTINE PATCH NONLEGEND: HCPCS | Performed by: ADVANCED PRACTICE MIDWIFE

## 2018-12-27 RX ORDER — ACETAMINOPHEN 500 MG
1000 TABLET ORAL ONCE
Status: COMPLETED | OUTPATIENT
Start: 2018-12-27 | End: 2018-12-27

## 2018-12-27 RX ORDER — HYDROCODONE BITARTRATE AND ACETAMINOPHEN 5; 325 MG/1; MG/1
1 TABLET ORAL EVERY 4 HOURS PRN
Status: DISCONTINUED | OUTPATIENT
Start: 2018-12-27 | End: 2018-12-29 | Stop reason: HOSPADM

## 2018-12-27 RX ORDER — ROPIVACAINE HYDROCHLORIDE 2 MG/ML
INJECTION, SOLUTION EPIDURAL; INFILTRATION; PERINEURAL
Status: DISCONTINUED | OUTPATIENT
Start: 2018-12-27 | End: 2018-12-27

## 2018-12-27 RX ORDER — ACETAMINOPHEN 325 MG/1
650 TABLET ORAL EVERY 6 HOURS PRN
Status: DISCONTINUED | OUTPATIENT
Start: 2018-12-27 | End: 2018-12-29 | Stop reason: HOSPADM

## 2018-12-27 RX ORDER — DIPHENHYDRAMINE HCL 25 MG
25 CAPSULE ORAL EVERY 4 HOURS PRN
Status: DISCONTINUED | OUTPATIENT
Start: 2018-12-27 | End: 2018-12-29 | Stop reason: HOSPADM

## 2018-12-27 RX ORDER — MISOPROSTOL 200 UG/1
600 TABLET ORAL
Status: DISCONTINUED | OUTPATIENT
Start: 2018-12-27 | End: 2018-12-27

## 2018-12-27 RX ORDER — IBUPROFEN 600 MG/1
600 TABLET ORAL EVERY 6 HOURS
Status: DISCONTINUED | OUTPATIENT
Start: 2018-12-28 | End: 2018-12-29 | Stop reason: HOSPADM

## 2018-12-27 RX ORDER — ONDANSETRON 8 MG/1
8 TABLET, ORALLY DISINTEGRATING ORAL EVERY 8 HOURS PRN
Status: DISCONTINUED | OUTPATIENT
Start: 2018-12-27 | End: 2018-12-29 | Stop reason: HOSPADM

## 2018-12-27 RX ORDER — ROPIVACAINE HYDROCHLORIDE 5 MG/ML
INJECTION, SOLUTION EPIDURAL; INFILTRATION; PERINEURAL
Status: DISCONTINUED | OUTPATIENT
Start: 2018-12-27 | End: 2018-12-28

## 2018-12-27 RX ORDER — FAMOTIDINE 10 MG/ML
20 INJECTION INTRAVENOUS ONCE
Status: DISCONTINUED | OUTPATIENT
Start: 2018-12-27 | End: 2018-12-27

## 2018-12-27 RX ORDER — SODIUM CHLORIDE 9 MG/ML
INJECTION, SOLUTION INTRAVENOUS
Status: DISCONTINUED | OUTPATIENT
Start: 2018-12-27 | End: 2018-12-27

## 2018-12-27 RX ORDER — SODIUM CITRATE AND CITRIC ACID MONOHYDRATE 334; 500 MG/5ML; MG/5ML
30 SOLUTION ORAL ONCE
Status: DISCONTINUED | OUTPATIENT
Start: 2018-12-27 | End: 2018-12-27

## 2018-12-27 RX ORDER — OXYTOCIN/RINGER'S LACTATE 20/1000 ML
333 PLASTIC BAG, INJECTION (ML) INTRAVENOUS CONTINUOUS
Status: DISCONTINUED | OUTPATIENT
Start: 2018-12-27 | End: 2018-12-27

## 2018-12-27 RX ORDER — LIDOCAINE HCL/EPINEPHRINE/PF 2%-1:200K
VIAL (ML) INJECTION
Status: DISCONTINUED | OUTPATIENT
Start: 2018-12-27 | End: 2018-12-28

## 2018-12-27 RX ORDER — ROPIVACAINE HYDROCHLORIDE 2 MG/ML
INJECTION, SOLUTION EPIDURAL; INFILTRATION; PERINEURAL
Status: DISCONTINUED | OUTPATIENT
Start: 2018-12-27 | End: 2018-12-28

## 2018-12-27 RX ORDER — DOCUSATE SODIUM 100 MG/1
200 CAPSULE, LIQUID FILLED ORAL 2 TIMES DAILY PRN
Status: DISCONTINUED | OUTPATIENT
Start: 2018-12-27 | End: 2018-12-29 | Stop reason: HOSPADM

## 2018-12-27 RX ORDER — ROPIVACAINE HYDROCHLORIDE 2 MG/ML
INJECTION, SOLUTION EPIDURAL; INFILTRATION; PERINEURAL CONTINUOUS PRN
Status: DISCONTINUED | OUTPATIENT
Start: 2018-12-27 | End: 2018-12-28

## 2018-12-27 RX ORDER — IBUPROFEN 200 MG
1 TABLET ORAL DAILY
Status: DISCONTINUED | OUTPATIENT
Start: 2018-12-27 | End: 2018-12-27

## 2018-12-27 RX ORDER — OXYTOCIN/RINGER'S LACTATE 20/1000 ML
41.65 PLASTIC BAG, INJECTION (ML) INTRAVENOUS CONTINUOUS
Status: DISPENSED | OUTPATIENT
Start: 2018-12-27 | End: 2018-12-28

## 2018-12-27 RX ORDER — DIPHENHYDRAMINE HYDROCHLORIDE 50 MG/ML
25 INJECTION INTRAMUSCULAR; INTRAVENOUS EVERY 4 HOURS PRN
Status: DISCONTINUED | OUTPATIENT
Start: 2018-12-27 | End: 2018-12-29 | Stop reason: HOSPADM

## 2018-12-27 RX ORDER — ONDANSETRON 8 MG/1
8 TABLET, ORALLY DISINTEGRATING ORAL EVERY 8 HOURS PRN
Status: DISCONTINUED | OUTPATIENT
Start: 2018-12-27 | End: 2018-12-27

## 2018-12-27 RX ORDER — SODIUM CHLORIDE, SODIUM LACTATE, POTASSIUM CHLORIDE, CALCIUM CHLORIDE 600; 310; 30; 20 MG/100ML; MG/100ML; MG/100ML; MG/100ML
INJECTION, SOLUTION INTRAVENOUS CONTINUOUS
Status: DISCONTINUED | OUTPATIENT
Start: 2018-12-27 | End: 2018-12-27

## 2018-12-27 RX ORDER — OXYTOCIN/RINGER'S LACTATE 20/1000 ML
41.7 PLASTIC BAG, INJECTION (ML) INTRAVENOUS CONTINUOUS
Status: DISCONTINUED | OUTPATIENT
Start: 2018-12-27 | End: 2018-12-27

## 2018-12-27 RX ORDER — HYDROCODONE BITARTRATE AND ACETAMINOPHEN 7.5; 325 MG/1; MG/1
1 TABLET ORAL EVERY 4 HOURS PRN
Status: DISCONTINUED | OUTPATIENT
Start: 2018-12-27 | End: 2018-12-29 | Stop reason: HOSPADM

## 2018-12-27 RX ORDER — HYDROCORTISONE 25 MG/G
CREAM TOPICAL 3 TIMES DAILY PRN
Status: DISCONTINUED | OUTPATIENT
Start: 2018-12-27 | End: 2018-12-29 | Stop reason: HOSPADM

## 2018-12-27 RX ORDER — OXYTOCIN/RINGER'S LACTATE 20/1000 ML
2 PLASTIC BAG, INJECTION (ML) INTRAVENOUS CONTINUOUS
Status: DISCONTINUED | OUTPATIENT
Start: 2018-12-27 | End: 2018-12-27

## 2018-12-27 RX ORDER — ROPIVACAINE HYDROCHLORIDE 2 MG/ML
INJECTION, SOLUTION EPIDURAL; INFILTRATION CONTINUOUS
Status: DISCONTINUED | OUTPATIENT
Start: 2018-12-27 | End: 2018-12-27

## 2018-12-27 RX ADMIN — ROPIVACAINE HYDROCHLORIDE: 2 INJECTION, SOLUTION EPIDURAL; INFILTRATION at 03:12

## 2018-12-27 RX ADMIN — ONDANSETRON 8 MG: 8 TABLET, ORALLY DISINTEGRATING ORAL at 10:12

## 2018-12-27 RX ADMIN — ROPIVACAINE HYDROCHLORIDE 4 ML: 5 INJECTION, SOLUTION EPIDURAL; INFILTRATION; PERINEURAL at 03:12

## 2018-12-27 RX ADMIN — Medication 22 MILLI-UNITS/MIN: at 06:12

## 2018-12-27 RX ADMIN — LIDOCAINE HYDROCHLORIDE,EPINEPHRINE BITARTRATE 3 ML: 20; .005 INJECTION, SOLUTION EPIDURAL; INFILTRATION; INTRACAUDAL; PERINEURAL at 09:12

## 2018-12-27 RX ADMIN — Medication 2.5 MILLION UNITS: at 01:12

## 2018-12-27 RX ADMIN — Medication 2 MILLI-UNITS/MIN: at 06:12

## 2018-12-27 RX ADMIN — ROPIVACAINE HYDROCHLORIDE 5 ML: 2 INJECTION, SOLUTION EPIDURAL; INFILTRATION at 09:12

## 2018-12-27 RX ADMIN — IBUPROFEN 600 MG: 600 TABLET ORAL at 11:12

## 2018-12-27 RX ADMIN — SODIUM CHLORIDE, SODIUM LACTATE, POTASSIUM CHLORIDE, AND CALCIUM CHLORIDE: .6; .31; .03; .02 INJECTION, SOLUTION INTRAVENOUS at 01:12

## 2018-12-27 RX ADMIN — SODIUM CHLORIDE, SODIUM LACTATE, POTASSIUM CHLORIDE, AND CALCIUM CHLORIDE: .6; .31; .03; .02 INJECTION, SOLUTION INTRAVENOUS at 05:12

## 2018-12-27 RX ADMIN — SODIUM CHLORIDE, SODIUM LACTATE, POTASSIUM CHLORIDE, AND CALCIUM CHLORIDE 1000 ML: .6; .31; .03; .02 INJECTION, SOLUTION INTRAVENOUS at 08:12

## 2018-12-27 RX ADMIN — Medication 2.5 MILLION UNITS: at 09:12

## 2018-12-27 RX ADMIN — Medication 2.5 MILLION UNITS: at 05:12

## 2018-12-27 RX ADMIN — DEXTROSE 5 MILLION UNITS: 50 INJECTION, SOLUTION INTRAVENOUS at 05:12

## 2018-12-27 RX ADMIN — ROPIVACAINE HYDROCHLORIDE 14 ML/HR: 2 INJECTION, SOLUTION EPIDURAL; INFILTRATION at 09:12

## 2018-12-27 RX ADMIN — ACETAMINOPHEN 1000 MG: 500 TABLET ORAL at 02:12

## 2018-12-27 RX ADMIN — HYDROCODONE BITARTRATE AND ACETAMINOPHEN 1 TABLET: 7.5; 325 TABLET ORAL at 10:12

## 2018-12-27 RX ADMIN — NICOTINE 1 PATCH: 14 PATCH, EXTENDED RELEASE TRANSDERMAL at 09:12

## 2018-12-27 NOTE — PROGRESS NOTES
S: Co pressure mara in place   O:  VS reviewed, afebrile   Vitals:    18 1509 18 1519 18 1537 18 1604   BP: (!) 100/58 (!) 102/57 (!) 92/53 (!) 99/59   Pulse: 63 (!) 57 60 (!) 58   Resp:       Temp: 97.7 °F (36.5 °C)      TempSrc: Oral      SpO2:       Weight:       Height:           FHTs: 124  UC: 2-3  SVE:7/80/-1 head applied well to cervix    A: IUP @ 40w3d ;     Patient Active Problem List   Diagnosis    Elevated liver function tests    History of  delivery    Trichomonas infection    Chronic hepatitis C without hepatic coma    History of congenital anomaly of heart    Supervision of high risk pregnancy in third trimester    Tobacco use    Post term pregnancy over 40 weeks       P: MARA   Pitocin augm  Peanut ball  Close monitoring of mother and fetus  Anticipate vag del

## 2018-12-27 NOTE — SUBJECTIVE & OBJECTIVE
Obstetric HPI:  Patient reports None contractions, active fetal movement, No vaginal bleeding , No loss of fluid     This pregnancy has been complicated by hepatitis C positive,trichomonas, tobacco use,HX  birth    Obstetric History       T1      L2     SAB0   TAB0   Ectopic0   Multiple0   Live Births2       # Outcome Date GA Lbr Kris/2nd Weight Sex Delivery Anes PTL Lv   4 Current            3  01/01/15 26w0d   U    FD   2 Term 10/10/13 40w0d  1.814 kg (4 lb) M Vag-Spont EPI  JEN   1  02/21/10 32w0d  3.289 kg (7 lb 4 oz) M Vag-Spont EPI  JEN        Past Medical History:   Diagnosis Date    Hepatitis C     Miscarriage      Past Surgical History:   Procedure Laterality Date    DILATION AND CURETTAGE OF UTERUS      TONSILLECTOMY         PTA Medications   Medication Sig    prenatal 25/iron fum/folic/dha (PRENATAL-1 ORAL) Take by mouth once daily.       Review of patient's allergies indicates:  No Known Allergies     Family History     Problem Relation (Age of Onset)    Heart defect Mother        Tobacco Use    Smoking status: Current Every Day Smoker     Packs/day: 1.00     Types: Cigarettes    Smokeless tobacco: Never Used   Substance and Sexual Activity    Alcohol use: No    Drug use: Yes     Comment: polysubstance abuser     Sexual activity: Yes     Partners: Male     Review of Systems   Constitutional: Negative.    HENT: Negative.    Eyes: Negative.    Respiratory: Negative.    Cardiovascular: Negative.    Gastrointestinal: Negative.    Endocrine: Negative.    Genitourinary: Negative.    Musculoskeletal: Negative.    Neurological: Negative.    Hematological: Negative.    Psychiatric/Behavioral: Negative.    All other systems reviewed and are negative.  Breast: negative.       Objective:     Vital Signs (Most Recent):    Vital Signs (24h Range):  BP: (114)/(66) 114/66        There is no height or weight on file to calculate BMI.    FHT: 130Cat 2 (reassuring)  TOCO:  Q 30  minutes    Physical Exam:   Constitutional: She is oriented to person, place, and time. She appears well-developed and well-nourished.     Eyes: Conjunctivae are normal. Pupils are equal, round, and reactive to light.    Neck: Normal range of motion.    Cardiovascular: Normal rate and regular rhythm.     Pulmonary/Chest: Breath sounds normal.        Abdominal: Soft.     Genitourinary: Vagina normal and uterus normal.           Musculoskeletal: Normal range of motion and moves all extremeties.       Neurological: She is alert and oriented to person, place, and time.    Skin: Skin is warm.    Psychiatric: She has a normal mood and affect. Her behavior is normal. Thought content normal.       Cervix:  Dilation:  3.5  In office  Effacement:  50  Station: -3   Presentation:      Significant Labs:  Lab Results   Component Value Date    GROUPTRH O POS 05/03/2018    HEPBSAG Negative 05/03/2018    STREPBCULT No Group B Streptococcus isolated 11/23/2018       I have personallly reviewed all pertinent lab results from the last 24 hours.

## 2018-12-27 NOTE — PLAN OF CARE
Problem: Adult Inpatient Plan of Care  Goal: Plan of Care Review  Outcome: Ongoing (interventions implemented as appropriate)  IOL for postdates, Pitocin infusing, PCN on board for history of previous child with GBS sepsis, epidural in place. Pt. Appears to be coping well.

## 2018-12-27 NOTE — ANESTHESIA PREPROCEDURE EVALUATION
12/27/2018  Shruthi Riley is a 28 y.o., female.    Pre-op Assessment    I have reviewed the Patient Summary Reports.     I have reviewed the Nursing Notes.   I have reviewed the Medications.     Review of Systems  Anesthesia Hx:  No problems with previous Anesthesia  History of prior surgery of interest to airway management or planning: Previous anesthesia: Epidural, General Denies Family Hx of Anesthesia complications.   Denies Personal Hx of Anesthesia complications.   Social:  Non-Smoker, No Alcohol Use    Hematology/Oncology:     Oncology Normal    -- Anemia:   EENT/Dental:EENT/Dental Normal   Cardiovascular:  Cardiovascular Normal     Pulmonary:  Pulmonary Normal    Hepatic/GI:   Hepatitis, C    Musculoskeletal:  Musculoskeletal Normal    OB/GYN/PEDS:  G4L2 - post-date induction   Neurological:  Neurology Normal    Endocrine:  Endocrine Normal    Psych:  Psychiatric Normal           Physical Exam  General:  Well nourished    Airway/Jaw/Neck:  Airway Findings: Mouth Opening: Normal Tongue: Normal  General Airway Assessment: Adult  Mallampati: II  Improves to II with phonation.  TM Distance: Normal, at least 6 cm       Chest/Lungs:  Chest/Lungs Findings: Normal Respiratory Rate     Heart/Vascular:  Heart Findings: Rate: Normal        Mental Status:  Mental Status Findings:  Cooperative, Alert and Oriented         Anesthesia Plan  Type of Anesthesia, risks & benefits discussed:  Anesthesia Type:  epidural  Patient's Preference:   Intra-op Monitoring Plan: standard ASA monitors  Intra-op Monitoring Plan Comments:   Post Op Pain Control Plan:   Post Op Pain Control Plan Comments:   Induction:    Beta Blocker:  Patient is not currently on a Beta-Blocker (No further documentation required).       Informed Consent: Patient understands risks and agrees with Anesthesia plan.  Questions answered. Anesthesia  consent signed with patient.  ASA Score: 2     Day of Surgery Review of History & Physical:  There are no significant changes.

## 2018-12-27 NOTE — PROGRESS NOTES
S:Doing well, mara in place no pain   O:  VS reviewed, afebrile   Vitals:    18 1136 18 1149 18 1204 18 1219   BP: (!) 95/56 (!) 97/57 (!) 91/41 (!) 98/45   Pulse: (!) 57 (!) 59 70 70   Resp: 16      Temp: 97.8 °F (36.6 °C)      TempSrc: Oral      SpO2:       Weight:       Height:           FHTs:125 cat 1   UC:q2   SVE: 6/80/-2 arom clear   Pitocin at 12 mu/min   A: IUP @ 40w3d ;     Patient Active Problem List   Diagnosis    Elevated liver function tests    History of  delivery    Trichomonas infection    Chronic hepatitis C without hepatic coma    History of congenital anomaly of heart    Supervision of high risk pregnancy in third trimester    Tobacco use    Post term pregnancy over 40 weeks       P: MARA  Continue Pitocin augmentation   Anticipate   Cont close monitoring of fetus and mother

## 2018-12-27 NOTE — H&P
[facility]  Obstetrics  History & Physical    Patient Name: Shruthi Riley  MRN: [id number]  Admission Date: 2018  Primary Care Provider: Primary Doctor No    Subjective:     Principal Problem:<principal problem not specified>    History of Present Illness:   IUP at 40 weeks    No new subjective & objective note has been filed under this hospital service since the last note was generated.    Assessment/Plan:     28 y.o. female  at 40w3d for:    Post term pregnancy over 40 weeks    Admitted for elective induction of labor  Pitocin per protocol  Epidural if needed  Anticipate ANITRA Flores CNM  Obstetrics  [facility]

## 2018-12-27 NOTE — ANESTHESIA PROCEDURE NOTES
Epidural    Patient location during procedure: OB   Reason for block: primary anesthetic   Diagnosis: IUp with labor pain   Start time: 12/27/2018 9:15 AM  Timeout: 12/27/2018 9:13 AM  End time: 12/27/2018 9:26 AM  Staffing  Anesthesiologist: Wayne Lai MD  Preanesthetic Checklist  Completed: patient identified, pre-op evaluation, timeout performed, IV checked, risks and benefits discussed, monitors and equipment checked, anesthesia consent given, hand hygiene performed and patient being monitored  Preparation  Patient position: sitting  Prep: Betadine  Patient monitoring: Blood Pressure and Pulse Ox  Epidural  Skin Anesthetic: lidocaine 1%  Skin Wheal: 3 mL  Administration type: continuous  Approach: midline  Interspace: L3-4  Injection technique: GASTON air and GASTON saline  Needle and Epidural Catheter  Needle type: Tuohy   Needle gauge: 17  Needle length: 3.5 inches  Needle insertion depth: 6 cm  Catheter type: Mempile  Catheter size: 19 G  Catheter at skin depth: 15 cm  Test dose: 3 mL of lidocaine 2% with Epi 1-to-200,000  Additional Documentation: incremental injection, negative aspiration for heme and CSF, no paresthesia on injection, no significant pain on injection, no significant complaints from patient and no signs/symptoms of IV or SA injection  Needle localization: anatomical landmarks  Assessment  Right: T10   Dermatomal levels determined by pinch or prick  Ease of block: moderate  Patient's tolerance of the procedure: comfortable throughout block and no complaints  Post dural Puncture Headache?: No  Additional Notes  Ropivacaine 0.2% - 15 ml

## 2018-12-27 NOTE — NURSING
"Discussed feeding choice with mother.  Reviewed benefits of breastfeeding.  Patient given "What to Expect in the First 48 Hours" handout. Mother states her intention is breastfeeding.    Coffective counseling sheet Fall in Love discussed with mother. Reinforced immediate skin to skin, the magic first hour, importance of the first feeding and delaying routine procedures. Encouraged mother to download Coffective mobile florentino if she has not already done so. Mother verbalies understanding.    "

## 2018-12-27 NOTE — PROGRESS NOTES
S:Doing well, TRAV In place denies pain   O:  VS reviewed, afebrile   Vitals:    18 1005 18 1019 18 1041 18 1051   BP: 109/63 114/63 (!) 96/50 (!) 90/52   Pulse: 63 71 61 75   Resp:       Temp:       TempSrc:       SpO2: 98% 99%     Weight:       Height:           FHTs: 120  UC2-3  SVE: 5/75/-3, ballottable   Pitocin at 6mu/min  A: IUP @ 40w3d ;     Patient Active Problem List   Diagnosis    Elevated liver function tests    History of  delivery    Trichomonas infection    Chronic hepatitis C without hepatic coma    History of congenital anomaly of heart    Supervision of high risk pregnancy in third trimester    Tobacco use    Post term pregnancy over 40 weeks       P: TRAV in place  Pitocin augmentation   Anticipate vag del   Close monitoring fetal/mother status

## 2018-12-28 PROCEDURE — 99231 PR SUBSEQUENT HOSPITAL CARE,LEVL I: ICD-10-PCS | Mod: ,,, | Performed by: ADVANCED PRACTICE MIDWIFE

## 2018-12-28 PROCEDURE — 99231 SBSQ HOSP IP/OBS SF/LOW 25: CPT | Mod: ,,, | Performed by: ADVANCED PRACTICE MIDWIFE

## 2018-12-28 PROCEDURE — 25000003 PHARM REV CODE 250: Performed by: ADVANCED PRACTICE MIDWIFE

## 2018-12-28 PROCEDURE — 11000001 HC ACUTE MED/SURG PRIVATE ROOM

## 2018-12-28 PROCEDURE — S4991 NICOTINE PATCH NONLEGEND: HCPCS | Performed by: ADVANCED PRACTICE MIDWIFE

## 2018-12-28 RX ORDER — IBUPROFEN 200 MG
1 TABLET ORAL DAILY
Status: DISCONTINUED | OUTPATIENT
Start: 2018-12-28 | End: 2018-12-29 | Stop reason: HOSPADM

## 2018-12-28 RX ADMIN — HYDROCODONE BITARTRATE AND ACETAMINOPHEN 1 TABLET: 7.5; 325 TABLET ORAL at 08:12

## 2018-12-28 RX ADMIN — IBUPROFEN 600 MG: 600 TABLET ORAL at 11:12

## 2018-12-28 RX ADMIN — IBUPROFEN 600 MG: 600 TABLET ORAL at 06:12

## 2018-12-28 RX ADMIN — DOCUSATE SODIUM 200 MG: 100 CAPSULE, LIQUID FILLED ORAL at 11:12

## 2018-12-28 RX ADMIN — HYDROCODONE BITARTRATE AND ACETAMINOPHEN 1 TABLET: 7.5; 325 TABLET ORAL at 11:12

## 2018-12-28 RX ADMIN — HYDROCODONE BITARTRATE AND ACETAMINOPHEN 1 TABLET: 7.5; 325 TABLET ORAL at 02:12

## 2018-12-28 RX ADMIN — Medication 1 PATCH: at 11:12

## 2018-12-28 RX ADMIN — IBUPROFEN 600 MG: 600 TABLET ORAL at 05:12

## 2018-12-28 RX ADMIN — HYDROCODONE BITARTRATE AND ACETAMINOPHEN 1 TABLET: 7.5; 325 TABLET ORAL at 04:12

## 2018-12-28 RX ADMIN — HYDROCODONE BITARTRATE AND ACETAMINOPHEN 1 TABLET: 7.5; 325 TABLET ORAL at 07:12

## 2018-12-28 NOTE — ANESTHESIA POSTPROCEDURE EVALUATION
"Anesthesia Post Evaluation    Patient: Shruthi Riley    Procedure(s) Performed: * No procedures listed *    Final Anesthesia Type: epidural  Patient location during evaluation: labor & delivery  Patient participation: Yes- Able to Participate  Level of consciousness: awake and alert  Post-procedure vital signs: reviewed and stable  Pain management: adequate  Airway patency: patent  PONV status at discharge: No PONV  Anesthetic complications: no      Cardiovascular status: blood pressure returned to baseline  Respiratory status: unassisted, spontaneous ventilation and room air  Hydration status: euvolemic  Follow-up not needed.        Visit Vitals  /66   Pulse 60   Temp 36.6 °C (97.8 °F) (Oral)   Resp 17   Ht 5' 8" (1.727 m)   Wt 88.5 kg (195 lb)   LMP 03/08/2018   SpO2 99%   Breastfeeding? Unknown   BMI 29.65 kg/m²       Pain/Joel Score: Pain Rating Prior to Med Admin: 7 (12/28/2018  2:37 AM)  Pain Rating Post Med Admin: 0 (12/28/2018 12:30 AM)        "

## 2018-12-28 NOTE — PLAN OF CARE
Problem: Adult Inpatient Plan of Care  Goal: Plan of Care Review  Outcome: Ongoing (interventions implemented as appropriate)  Progressing well. Fundus firm, bleeding light. Pain controlled with po analgesics. Bonding with baby. VSS/WNLS. NAD.

## 2018-12-28 NOTE — ANESTHESIA RELEASE NOTE
"Anesthesia Release from PACU Note    Patient: Shruthi Riley    Procedure(s) Performed: * No procedures listed *    Anesthesia type: epidural    Post pain: Adequate analgesia    Post assessment: no apparent anesthetic complications and tolerated procedure well    Last Vitals:   Visit Vitals  /66   Pulse 60   Temp 36.6 °C (97.8 °F) (Oral)   Resp 17   Ht 5' 8" (1.727 m)   Wt 88.5 kg (195 lb)   LMP 03/08/2018   SpO2 99%   Breastfeeding? Unknown   BMI 29.65 kg/m²       Post vital signs: stable    Level of consciousness: awake, alert  and oriented    Nausea/Vomiting: no nausea/no vomiting    Complications: none    Airway Patency: patent    Respiratory: unassisted, spontaneous ventilation, room air    Cardiovascular: stable and blood pressure at baseline    Hydration: euvolemic  "

## 2018-12-28 NOTE — L&D DELIVERY NOTE
[facility] -   Vaginal Delivery   Operative Note    SUMMARY     Normal spontaneous vaginal delivery of live infant, was placed on mothers abdomen for skin to skin and bulb suctioning performed.  Infant delivered position ADAM over intact perineum.  Nuchal cord: reduced easily prior to birth of the head.     Spontaneous delivery of placenta and IV pitocin given noting good uterine tone.  No lacerations noted.  Patient tolerated delivery well. Sponge needle and lap counted correctly x2.    Indications: Normal vaginal delivery  Pregnancy complicated by:   Patient Active Problem List   Diagnosis    Elevated liver function tests    History of  delivery    Trichomonas infection    Chronic hepatitis C without hepatic coma    History of congenital anomaly of heart    Supervision of high risk pregnancy in third trimester    Tobacco use    Post term pregnancy over 40 weeks    Normal vaginal delivery    Normal  (single liveborn)     Admitting GA: 40w3d    Delivery Information for  Erlinda Riley    Birth information:  YOB: 2017   Time of birth: 7:23 PM   Sex: female   Head Delivery Date/Time: 2018  7:22 PM   Delivery type: Vaginal, Spontaneous   Gestational Age: 40w3d    Delivery Providers    Delivering clinician:  Sherrill Escobar CNM   Provider Role    Luz Lin RN Registered Nurse    Elie Koch [facility]    Daniela Kapadia RN Registered Nurse            Measurements    Weight:    Length:           Apgars    Living status:  Living  Apgars:   1 min.:   5 min.:   10 min.:   15 min.:   20 min.:     Skin color:   0  1       Heart rate:   2  2       Reflex irritability:   2  2       Muscle tone:   2  2       Respiratory effort:   2  2       Total:   8  9       Apgars assigned by:  DANIELA KAPADIA RN         Operative Delivery    Forceps attempted?:  No  Vacuum extractor attempted?:  No         Shoulder Dystocia    Shoulder dystocia present?:  No            Presentation    Presentation:  Vertex  Position:  Middle Occiput Anterior           Interventions/Resuscitation    Method:  Bulb Suctioning, Tactile Stimulation       Cord    Vessels:  3 vessels  Complications:  Nuchal  Nuchal Intervention:  reduced  Nuchal Cord Description:  loose nuchal cord  Number of Loops:  1  Delayed Cord Clamping?:  Yes  Cord Clamped Date/Time:  2018  7:28 PM  Cord Blood Disposition:  Lab  Gases Sent?:  No  Stem Cell Collection (by MD):  No       Placenta    Placenta delivery date/time:  2018 193  Placenta removal:  Spontaneous  Placenta appearance:  Intact  Placenta disposition:  discarded           Labor Events:       labor: No     Labor Onset Date/Time:         Dilation Complete Date/Time:         Start Pushing Date/Time:       Rupture Date/Time:              Rupture type:           Fluid Amount:        Fluid Color:        Fluid Odor:        Membrane Status (PeriCalm): ARM (Artificial Rupture)      Rupture Date/Time (PeriCalm): 2018 12:55:00      Fluid Amount (PeriCalm): Large      Fluid Color (PeriCalm): Clear       steroids: None     Antibiotics given for GBS: Yes     Induction: oxytocin     Indications for induction:  Post-term Gestation     Augmentation: amniotomy;oxytocin     Indications for augmentation: Ineffective Contraction Pattern     Labor complications: None     Additional complications:          Cervical ripening:                     Delivery:      Episiotomy: None     Indication for Episiotomy:       Perineal Lacerations: None Repaired:      Periurethral Laceration: none Repaired:     Labial Laceration: none Repaired:     Sulcus Laceration: none Repaired:     Vaginal Laceration: No Repaired:     Cervical Laceration: No Repaired:     Repair suture: None     Repair # of packets:       Vaginal delivery QBL (mL):        QBL (mL): 0     Combined Blood Loss (mL): 0     Vaginal Sweep Performed:       Surgicount Correct:          Other providers:       Anesthesia    Method:  Epidural          Details (if applicable):  Trial of Labor      Categorization:      Priority:     Indications for :     Incision Type:       Additional  information:  Forceps:    Vacuum:    Breech:    Observed anomalies    Other (Comments):

## 2018-12-28 NOTE — LACTATION NOTE
Lactation Rounds: Pediatrician in the room and explains that breastfeeding is not a contraindication with a positive Hep C status as long as nipples are intact and not cracked or bleeding. Mother states she does not wish to breastfeed her infant. Mothers informed decision respected.

## 2018-12-28 NOTE — SUBJECTIVE & OBJECTIVE
Hospital course: 12/28/18-PPD1, routine PP care    Interval History:     She is doing well this morning. She is tolerating a regular diet without nausea or vomiting. She is voiding spontaneously. She is ambulating. She has passed flatus, and has not a BM. Vaginal bleeding is mild. She denies fever or chills. Abdominal pain is mild and controlled with oral medications. She is breastfeeding. She desires circumcision for her male baby: not applicable.    Objective:     Vital Signs (Most Recent):  Temp: 97.8 °F (36.6 °C) (12/28/18 0800)  Pulse: 60 (12/28/18 0240)  Resp: 17 (12/28/18 0240)  BP: 111/63 (12/28/18 0800)  SpO2: 99 % (12/27/18 1019) Vital Signs (24h Range):  Temp:  [97.7 °F (36.5 °C)-98.1 °F (36.7 °C)] 97.8 °F (36.6 °C)  Pulse:  [] 60  Resp:  [16-18] 17  BP: ()/(31-91) 111/63     Weight: 88.5 kg (195 lb)  Body mass index is 29.65 kg/m².      Intake/Output Summary (Last 24 hours) at 12/28/2018 1043  Last data filed at 12/28/2018 0240  Gross per 24 hour   Intake --   Output 2700 ml   Net -2700 ml       Significant Labs:  Lab Results   Component Value Date    GROUPTRH O POS 12/27/2018    HEPBSAG Negative 05/03/2018    STREPBCULT No Group B Streptococcus isolated 11/23/2018     Recent Labs   Lab 12/27/18  0530   HGB 11.2*   HCT 33.7*       I have personallly reviewed all pertinent lab results from the last 24 hours.    Physical Exam:   Constitutional: She is oriented to person, place, and time. She appears well-developed and well-nourished.    HENT:   Head: Normocephalic.     Neck: Normal range of motion.    Cardiovascular: Normal rate and regular rhythm.     Pulmonary/Chest: Effort normal.        Abdominal: Soft.     Genitourinary: Uterus normal.           Musculoskeletal: Normal range of motion and moves all extremeties.       Neurological: She is alert and oriented to person, place, and time. She has normal reflexes.    Skin: Skin is warm and dry.

## 2018-12-28 NOTE — PROGRESS NOTES
[facility] -   Obstetrics  Postpartum Progress Note    Patient Name: Shruthi Riley  MRN: [id number]  Admission Date: 12/27/2018  Hospital Length of Stay: 1 days  Attending Physician: Lisa Gallegos MD  Primary Care Provider: Primary Doctor No    Subjective:     Principal Problem:Normal vaginal delivery    Hospital course: 12/28/18-PPD1, routine PP care    Interval History:     She is doing well this morning. She is tolerating a regular diet without nausea or vomiting. She is voiding spontaneously. She is ambulating. She has passed flatus, and has not a BM. Vaginal bleeding is mild. She denies fever or chills. Abdominal pain is mild and controlled with oral medications. She is breastfeeding. She desires circumcision for her male baby: not applicable.    Objective:     Vital Signs (Most Recent):  Temp: 97.8 °F (36.6 °C) (12/28/18 0800)  Pulse: 60 (12/28/18 0240)  Resp: 17 (12/28/18 0240)  BP: 111/63 (12/28/18 0800)  SpO2: 99 % (12/27/18 1019) Vital Signs (24h Range):  Temp:  [97.7 °F (36.5 °C)-98.1 °F (36.7 °C)] 97.8 °F (36.6 °C)  Pulse:  [] 60  Resp:  [16-18] 17  BP: ()/(31-91) 111/63     Weight: 88.5 kg (195 lb)  Body mass index is 29.65 kg/m².      Intake/Output Summary (Last 24 hours) at 12/28/2018 1043  Last data filed at 12/28/2018 0240  Gross per 24 hour   Intake --   Output 2700 ml   Net -2700 ml       Significant Labs:  Lab Results   Component Value Date    GROUPTRH O POS 12/27/2018    HEPBSAG Negative 05/03/2018    STREPBCULT No Group B Streptococcus isolated 11/23/2018     Recent Labs   Lab 12/27/18  0530   HGB 11.2*   HCT 33.7*       I have personallly reviewed all pertinent lab results from the last 24 hours.    Physical Exam:   Constitutional: She is oriented to person, place, and time. She appears well-developed and well-nourished.    HENT:   Head: Normocephalic.     Neck: Normal range of motion.    Cardiovascular: Normal rate and regular rhythm.     Pulmonary/Chest: Effort  normal.        Abdominal: Soft.     Genitourinary: Uterus normal.           Musculoskeletal: Normal range of motion and moves all extremeties.       Neurological: She is alert and oriented to person, place, and time. She has normal reflexes.    Skin: Skin is warm and dry.        Assessment/Plan:     28 y.o. female  for:    * Normal vaginal delivery    Routine PP care     Normal  (single liveborn)    Viable female infant     Post term pregnancy over 40 weeks    Admitted for elective induction of labor  Pitocin per protocol  Epidural if needed  Anticipate          Disposition: As patient meets milestones, will plan to discharge tomorrow.    Ignacia Nair CNM  Obstetrics  [facility]

## 2018-12-28 NOTE — PLAN OF CARE
Problem: Adult Inpatient Plan of Care  Goal: Plan of Care Review  Outcome: Ongoing (interventions implemented as appropriate)  Pt progressing well. Pt complaining of back pain at epidural site. Taking Norco and Ibuprofen for pain control. Up ad toñito and voiding without difficulty. Bleeding is light. Vitals stable. Bonding with baby. Formula feeding.

## 2018-12-29 VITALS
BODY MASS INDEX: 29.55 KG/M2 | TEMPERATURE: 99 F | DIASTOLIC BLOOD PRESSURE: 75 MMHG | HEART RATE: 62 BPM | WEIGHT: 195 LBS | HEIGHT: 68 IN | RESPIRATION RATE: 18 BRPM | SYSTOLIC BLOOD PRESSURE: 115 MMHG | OXYGEN SATURATION: 99 %

## 2018-12-29 PROBLEM — A49.1 GBS (GROUP B STREPTOCOCCUS) INFECTION: Status: ACTIVE | Noted: 2018-12-29

## 2018-12-29 PROBLEM — O48.0 POST TERM PREGNANCY OVER 40 WEEKS: Status: RESOLVED | Noted: 2018-12-27 | Resolved: 2018-12-29

## 2018-12-29 PROCEDURE — 99238 PR HOSPITAL DISCHARGE DAY,<30 MIN: ICD-10-PCS | Mod: ,,, | Performed by: ADVANCED PRACTICE MIDWIFE

## 2018-12-29 PROCEDURE — S4991 NICOTINE PATCH NONLEGEND: HCPCS | Performed by: ADVANCED PRACTICE MIDWIFE

## 2018-12-29 PROCEDURE — 25000003 PHARM REV CODE 250: Performed by: ADVANCED PRACTICE MIDWIFE

## 2018-12-29 PROCEDURE — 99238 HOSP IP/OBS DSCHRG MGMT 30/<: CPT | Mod: ,,, | Performed by: ADVANCED PRACTICE MIDWIFE

## 2018-12-29 RX ADMIN — HYDROCODONE BITARTRATE AND ACETAMINOPHEN 1 TABLET: 7.5; 325 TABLET ORAL at 05:12

## 2018-12-29 RX ADMIN — IBUPROFEN 600 MG: 600 TABLET ORAL at 12:12

## 2018-12-29 RX ADMIN — Medication 1 PATCH: at 09:12

## 2018-12-29 RX ADMIN — IBUPROFEN 600 MG: 600 TABLET ORAL at 05:12

## 2018-12-29 RX ADMIN — HYDROCODONE BITARTRATE AND ACETAMINOPHEN 1 TABLET: 5; 325 TABLET ORAL at 10:12

## 2018-12-29 RX ADMIN — HYDROCODONE BITARTRATE AND ACETAMINOPHEN 1 TABLET: 7.5; 325 TABLET ORAL at 12:12

## 2018-12-29 NOTE — CONSULTS
Met with patient and spouse per consult. Patient has all essential items for . Pediatrician will be Dr. Tolliver. Patient has h/o substance abuse, but has been clean over a year. She has a  through her insurance that she is in touch with every 2 weeks. She reports she can contact her for any needs or if she needs a referral for counseling, etc. Patient denies any current needs or concerns. Encouraged her to contact MSW if any needs arise.

## 2018-12-29 NOTE — NURSING
Discharge instructions given, verbalized understanding.  Reviewed scheduled follow-up appointment with patient.  Patient will remain on unit to room-in with .  VSS.

## 2018-12-29 NOTE — DISCHARGE SUMMARY
[facility] -   Obstetrics  Discharge Summary      Patient Name: Shruthi Riley  MRN: [id number]  Admission Date: 2018  Hospital Length of Stay: 2 days  Discharge Date and Time:  2018 8:53 AM  Attending Physician: Colin William MD   Discharging Provider: Amadou Guillen CNM  Primary Care Provider: Primary Doctor No    HPI:  IUP at 40 weeks    * No surgery found *     Hospital Course:   18-PPD1, routine PP care    Consults (From admission, onward)        Status Ordering Provider     Inpatient consult to Social Work  Once     Provider:  (Not yet assigned)    Acknowledged COLIN WILLIAM          Final Active Diagnoses:    Diagnosis Date Noted POA    PRINCIPAL PROBLEM:  Normal vaginal delivery [O80] 2018 Not Applicable    GBS (group B streptococcus) infection [A49.1] 2018 Yes    Normal  (single liveborn) [Z38.2] 2018 No    History of congenital anomaly of heart [Z87.74] 2018 Not Applicable    Chronic hepatitis C without hepatic coma [B18.2] 2018 Yes      Problems Resolved During this Admission:    Diagnosis Date Noted Date Resolved POA    Post term pregnancy over 40 weeks [O48.0] 2018 Yes        Labs: All labs within the past 24 hours have been reviewed    Feeding Method: bottle    Immunizations     Date Immunization Status Dose Route/Site Given by    18 MMR Incomplete 0.5 mL Subcutaneous/Left deltoid     18 Tdap Incomplete 0.5 mL Intramuscular/Left deltoid           Delivery:    Episiotomy: None   Lacerations: None   Repair suture: None   Repair # of packets:     Blood loss (ml): 100     Birth information:  YOB: 2017   Time of birth: 7:23 PM   Sex: female   Delivery type: Vaginal, Spontaneous   Gestational Age: 40w3d    Delivery Clinician:      Other providers:       Additional  information:  Forceps:    Vacuum:    Breech:    Observed anomalies      Living?:           APGARS  One  minute Five minutes Ten minutes   Skin color:         Heart rate:         Grimace:         Muscle tone:         Breathing:         Totals: 8  9        Placenta: Delivered:       appearance    Pending Diagnostic Studies:     None          Discharged Condition: good    Disposition: Home or Self Care    Follow Up:  Follow-up Information     Nirali Batista CNM. Go in 4 weeks.    Specialty:  Obstetrics  Why:  Postpartum Visit  Contact information:  [address] [id number] [phone]                 Patient Instructions:   No discharge procedures on file.  Medications:  Current Discharge Medication List      CONTINUE these medications which have NOT CHANGED    Details   prenatal 25/iron fum/folic/dha (PRENATAL-1 ORAL) Take by mouth once daily.             Amadou Guillen CNM  Obstetrics  [facility]

## 2018-12-29 NOTE — PLAN OF CARE
Problem: Adult Inpatient Plan of Care  Goal: Plan of Care Review  Outcome: Ongoing (interventions implemented as appropriate)  Pt progressing well. Up ad toñito. Pain controlled with oral pain medication. Voids spontaneously without difficulty. Fundus firm without massage. Bleeding light. Formula feeding. Bonding appropriately with baby. Will continue to monitor, VSS.

## 2018-12-29 NOTE — DISCHARGE INSTRUCTIONS

## 2019-01-25 ENCOUNTER — TELEPHONE (OUTPATIENT)
Dept: RADIOLOGY | Facility: HOSPITAL | Age: 29
End: 2019-01-25

## 2019-01-29 ENCOUNTER — PATIENT MESSAGE (OUTPATIENT)
Dept: GASTROENTEROLOGY | Facility: CLINIC | Age: 29
End: 2019-01-29

## 2019-02-05 ENCOUNTER — TELEPHONE (OUTPATIENT)
Dept: OBSTETRICS AND GYNECOLOGY | Facility: CLINIC | Age: 29
End: 2019-02-05

## 2019-02-05 NOTE — TELEPHONE ENCOUNTER
----- Message from Sharda Peter sent at 2/5/2019  9:09 AM CST -----  Contact: pt  She is calling to reschedule her appt for a later time this week if possible, please advise [phone] (home)

## 2019-02-05 NOTE — TELEPHONE ENCOUNTER
Spoke to patient and rescheduled her appointment for 02/06/19 at 11:20am at the [facility] location to see OPAL Bhatti for her postpartum visit. Patient verbalized understanding.

## 2019-02-06 ENCOUNTER — POSTPARTUM VISIT (OUTPATIENT)
Dept: OBSTETRICS AND GYNECOLOGY | Facility: CLINIC | Age: 29
End: 2019-02-06
Payer: MEDICAID

## 2019-02-06 VITALS
SYSTOLIC BLOOD PRESSURE: 120 MMHG | WEIGHT: 169.75 LBS | DIASTOLIC BLOOD PRESSURE: 70 MMHG | HEIGHT: 68 IN | BODY MASS INDEX: 25.73 KG/M2

## 2019-02-06 DIAGNOSIS — Z30.011 ENCOUNTER FOR INITIAL PRESCRIPTION OF CONTRACEPTIVE PILLS: ICD-10-CM

## 2019-02-06 PROBLEM — O09.93 SUPERVISION OF HIGH RISK PREGNANCY IN THIRD TRIMESTER: Status: RESOLVED | Noted: 2018-07-26 | Resolved: 2019-02-06

## 2019-02-06 PROBLEM — A59.9 TRICHOMONAS INFECTION: Status: RESOLVED | Noted: 2018-06-01 | Resolved: 2019-02-06

## 2019-02-06 PROBLEM — Z72.0 TOBACCO USE: Status: RESOLVED | Noted: 2018-11-29 | Resolved: 2019-02-06

## 2019-02-06 PROCEDURE — 99999 PR PBB SHADOW E&M-EST. PATIENT-LVL II: ICD-10-PCS | Mod: PBBFAC,,, | Performed by: ADVANCED PRACTICE MIDWIFE

## 2019-02-06 PROCEDURE — 99212 OFFICE O/P EST SF 10 MIN: CPT | Mod: PBBFAC | Performed by: ADVANCED PRACTICE MIDWIFE

## 2019-02-06 PROCEDURE — 99999 PR PBB SHADOW E&M-EST. PATIENT-LVL II: CPT | Mod: PBBFAC,,, | Performed by: ADVANCED PRACTICE MIDWIFE

## 2019-02-06 RX ORDER — NORGESTIMATE AND ETHINYL ESTRADIOL 7DAYSX3 28
1 KIT ORAL DAILY
Qty: 30 TABLET | Refills: 11 | Status: SHIPPED | OUTPATIENT
Start: 2019-02-06 | End: 2020-01-27

## 2019-02-06 NOTE — PROGRESS NOTES
28 y.o. female for postpartum visit.  Patient has no complaints.  Her delivery records were reviewed.  She is bottle feeding infant.    Exam  General - well appearing, no apparent distress  Abdomen - soft, non tender, non distended incision none.  Pelvic - normal external genitalia, any lacerations well healed               uterus non tender, appropriately sized  Extremeties - no edema    Assessment:  Encounter Diagnoses   Name Primary?    Routine postpartum follow-up Yes    Encounter for initial prescription of contraceptive pills       Desires OCP for contraception. No longer smoking. Discussed risks of DVT development with birth control use.  Pt denies history of blood clots, DVT, cardiac issues, HTN, CVA, gallbladder disease, liver disease, or adrenal disease.   Pt denies family hx of DVT use.  F/u - return for annual exam when due

## 2019-02-09 ENCOUNTER — PATIENT MESSAGE (OUTPATIENT)
Dept: GASTROENTEROLOGY | Facility: CLINIC | Age: 29
End: 2019-02-09

## 2019-02-11 ENCOUNTER — PATIENT MESSAGE (OUTPATIENT)
Dept: OBSTETRICS AND GYNECOLOGY | Facility: CLINIC | Age: 29
End: 2019-02-11

## 2019-02-14 ENCOUNTER — TELEPHONE (OUTPATIENT)
Dept: RADIOLOGY | Facility: HOSPITAL | Age: 29
End: 2019-02-14

## 2019-02-15 ENCOUNTER — HOSPITAL ENCOUNTER (OUTPATIENT)
Dept: RADIOLOGY | Facility: HOSPITAL | Age: 29
Discharge: HOME | End: 2019-02-15
Attending: INTERNAL MEDICINE
Payer: MEDICAID

## 2019-02-15 DIAGNOSIS — R93.5 ABNORMAL ULTRASOUND OF ABDOMEN: Primary | ICD-10-CM

## 2019-02-15 DIAGNOSIS — K76.9 LIVER LESION: ICD-10-CM

## 2019-02-15 PROCEDURE — 74183 MRI ABDOMEN W WO CONTRAST: ICD-10-PCS | Mod: 26,,, | Performed by: RADIOLOGY

## 2019-02-15 PROCEDURE — A9585 GADOBUTROL INJECTION: HCPCS | Performed by: INTERNAL MEDICINE

## 2019-02-15 PROCEDURE — 25500020 PHARM REV CODE 255: Performed by: INTERNAL MEDICINE

## 2019-02-15 PROCEDURE — 74183 MRI ABD W/O CNTR FLWD CNTR: CPT | Mod: TC

## 2019-02-15 PROCEDURE — 74183 MRI ABD W/O CNTR FLWD CNTR: CPT | Mod: 26,,, | Performed by: RADIOLOGY

## 2019-02-15 RX ORDER — GADOBUTROL 604.72 MG/ML
7.5 INJECTION INTRAVENOUS
Status: COMPLETED | OUTPATIENT
Start: 2019-02-15 | End: 2019-02-15

## 2019-02-15 RX ADMIN — GADOBUTROL 7.5 ML: 604.72 INJECTION INTRAVENOUS at 10:02

## 2019-03-20 ENCOUNTER — LAB VISIT (OUTPATIENT)
Dept: LAB | Facility: HOSPITAL | Age: 29
End: 2019-03-20
Attending: INTERNAL MEDICINE
Payer: MEDICAID

## 2019-03-20 ENCOUNTER — OFFICE VISIT (OUTPATIENT)
Dept: GASTROENTEROLOGY | Facility: CLINIC | Age: 29
End: 2019-03-20
Payer: MEDICAID

## 2019-03-20 VITALS
SYSTOLIC BLOOD PRESSURE: 114 MMHG | HEIGHT: 68 IN | WEIGHT: 169.56 LBS | DIASTOLIC BLOOD PRESSURE: 80 MMHG | BODY MASS INDEX: 25.7 KG/M2 | HEART RATE: 72 BPM

## 2019-03-20 DIAGNOSIS — B18.2 CHRONIC HEPATITIS C WITHOUT HEPATIC COMA: ICD-10-CM

## 2019-03-20 DIAGNOSIS — B18.2 CHRONIC HEPATITIS C WITHOUT HEPATIC COMA: Primary | ICD-10-CM

## 2019-03-20 DIAGNOSIS — K76.9 LIVER LESION: ICD-10-CM

## 2019-03-20 LAB
ALBUMIN SERPL BCP-MCNC: 4 G/DL
ALP SERPL-CCNC: 73 U/L
ALT SERPL W/O P-5'-P-CCNC: 103 U/L
ANION GAP SERPL CALC-SCNC: 8 MMOL/L
AST SERPL-CCNC: 24 U/L
BASOPHILS # BLD AUTO: 0.04 K/UL
BASOPHILS NFR BLD: 0.7 %
BILIRUB SERPL-MCNC: 0.3 MG/DL
BUN SERPL-MCNC: 13 MG/DL
CALCIUM SERPL-MCNC: 9.7 MG/DL
CHLORIDE SERPL-SCNC: 107 MMOL/L
CO2 SERPL-SCNC: 26 MMOL/L
CREAT SERPL-MCNC: 0.7 MG/DL
DIFFERENTIAL METHOD: NORMAL
EOSINOPHIL # BLD AUTO: 0.1 K/UL
EOSINOPHIL NFR BLD: 1.7 %
ERYTHROCYTE [DISTWIDTH] IN BLOOD BY AUTOMATED COUNT: 12.1 %
EST. GFR  (AFRICAN AMERICAN): >60 ML/MIN/1.73 M^2
EST. GFR  (NON AFRICAN AMERICAN): >60 ML/MIN/1.73 M^2
GLUCOSE SERPL-MCNC: 84 MG/DL
HCT VFR BLD AUTO: 39.3 %
HGB BLD-MCNC: 12.9 G/DL
IMM GRANULOCYTES # BLD AUTO: 0.02 K/UL
IMM GRANULOCYTES NFR BLD AUTO: 0.3 %
LYMPHOCYTES # BLD AUTO: 2.5 K/UL
LYMPHOCYTES NFR BLD: 43.5 %
MCH RBC QN AUTO: 29.3 PG
MCHC RBC AUTO-ENTMCNC: 32.8 G/DL
MCV RBC AUTO: 89 FL
MONOCYTES # BLD AUTO: 0.5 K/UL
MONOCYTES NFR BLD: 8.8 %
NEUTROPHILS # BLD AUTO: 2.6 K/UL
NEUTROPHILS NFR BLD: 45 %
NRBC BLD-RTO: 0 /100 WBC
PLATELET # BLD AUTO: 249 K/UL
PMV BLD AUTO: 10.7 FL
POTASSIUM SERPL-SCNC: 4.1 MMOL/L
PROT SERPL-MCNC: 7.1 G/DL
RBC # BLD AUTO: 4.4 M/UL
SODIUM SERPL-SCNC: 141 MMOL/L
WBC # BLD AUTO: 5.82 K/UL

## 2019-03-20 PROCEDURE — 85025 COMPLETE CBC W/AUTO DIFF WBC: CPT

## 2019-03-20 PROCEDURE — 99213 OFFICE O/P EST LOW 20 MIN: CPT | Mod: PBBFAC,PN | Performed by: INTERNAL MEDICINE

## 2019-03-20 PROCEDURE — 99999 PR PBB SHADOW E&M-EST. PATIENT-LVL III: ICD-10-PCS | Mod: PBBFAC,,, | Performed by: INTERNAL MEDICINE

## 2019-03-20 PROCEDURE — 99999 PR PBB SHADOW E&M-EST. PATIENT-LVL III: CPT | Mod: PBBFAC,,, | Performed by: INTERNAL MEDICINE

## 2019-03-20 PROCEDURE — 99213 PR OFFICE/OUTPT VISIT, EST, LEVL III, 20-29 MIN: ICD-10-PCS | Mod: S$PBB,,, | Performed by: INTERNAL MEDICINE

## 2019-03-20 PROCEDURE — 86704 HEP B CORE ANTIBODY TOTAL: CPT

## 2019-03-20 PROCEDURE — 86790 VIRUS ANTIBODY NOS: CPT

## 2019-03-20 PROCEDURE — 86706 HEP B SURFACE ANTIBODY: CPT

## 2019-03-20 PROCEDURE — 99213 OFFICE O/P EST LOW 20 MIN: CPT | Mod: S$PBB,,, | Performed by: INTERNAL MEDICINE

## 2019-03-20 PROCEDURE — 36415 COLL VENOUS BLD VENIPUNCTURE: CPT

## 2019-03-20 PROCEDURE — 80053 COMPREHEN METABOLIC PANEL: CPT

## 2019-03-20 NOTE — PROGRESS NOTES
Subjective:     Shruthi Riley is here for follow up of Results and Hepatitis C      HPI  Since Shruthi Riley's last visit there have been no acute issues. Overall feeling well. Her infant child is also doing well. She is also open to having additional children.  She is worried about her hepatitis C especially as this relates to her health and childbearing in the future.  She would like to get hepatitis C treated.  She remains absence of illicit drugs and alcohol.    No evidence of liver decompensation: no ascites, confusion or GI bleeding.      MRI 2/2019  No MRI correlate to the abnormality seen on ultrasound.  No suspicious hepatic lesion.  Consider six-month follow-up ultrasound to document stability.      Review of Systems    Objective:     Physical Exam   Constitutional: She is oriented to person, place, and time. She appears well-developed and well-nourished. No distress.   HENT:   Head: Normocephalic and atraumatic.   Mouth/Throat: Oropharynx is clear and moist. No oropharyngeal exudate.   Eyes: Conjunctivae are normal. Pupils are equal, round, and reactive to light. Right eye exhibits no discharge. Left eye exhibits no discharge. No scleral icterus.   Pulmonary/Chest: Effort normal and breath sounds normal. No respiratory distress. She has no wheezes.   Abdominal: Soft. She exhibits no distension. There is no tenderness.   Musculoskeletal: She exhibits no edema.   Neurological: She is alert and oriented to person, place, and time.   Psychiatric: She has a normal mood and affect. Her behavior is normal.   Vitals reviewed.      Computed MELD-Na score unavailable. Necessary lab results were not found in the last year.  Computed MELD score unavailable. Necessary lab results were not found in the last year.    WBC   Date Value Ref Range Status   12/27/2018 13.58 (H) 3.90 - 12.70 K/uL Final     Hemoglobin   Date Value Ref Range Status   12/27/2018 11.2 (L) 12.0 - 16.0 g/dL Final     Hematocrit   Date  Value Ref Range Status   12/27/2018 33.7 (L) 37.0 - 48.5 % Final     Platelets   Date Value Ref Range Status   12/27/2018 260 150 - 350 K/uL Final     BUN, Bld   Date Value Ref Range Status   06/01/2018 8 6 - 20 mg/dL Final     Creatinine   Date Value Ref Range Status   06/01/2018 0.7 0.5 - 1.4 mg/dL Final     Glucose   Date Value Ref Range Status   06/01/2018 118 (H) 70 - 110 mg/dL Final     Calcium   Date Value Ref Range Status   06/01/2018 9.2 8.7 - 10.5 mg/dL Final     Sodium   Date Value Ref Range Status   06/01/2018 138 136 - 145 mmol/L Final     Potassium   Date Value Ref Range Status   06/01/2018 3.6 3.5 - 5.1 mmol/L Final     Chloride   Date Value Ref Range Status   06/01/2018 106 95 - 110 mmol/L Final     AST   Date Value Ref Range Status   10/09/2018 31 10 - 40 U/L Final     ALT   Date Value Ref Range Status   10/09/2018 52 (H) 10 - 44 U/L Final     Alkaline Phosphatase   Date Value Ref Range Status   10/09/2018 93 55 - 135 U/L Final     Total Bilirubin   Date Value Ref Range Status   10/09/2018 0.3 0.1 - 1.0 mg/dL Final     Comment:     For infants and newborns, interpretation of results should be based  on gestational age, weight and in agreement with clinical  observations.  Premature Infant recommended reference ranges:  Up to 24 hours.............<8.0 mg/dL  Up to 48 hours............<12.0 mg/dL  3-5 days..................<15.0 mg/dL  6-29 days.................<15.0 mg/dL       Albumin   Date Value Ref Range Status   10/09/2018 2.9 (L) 3.5 - 5.2 g/dL Final     No results found for: INR      Assessment/Plan:     1. Chronic hepatitis C without hepatic coma    2. Liver lesion      Shruthi Riley is a 28 y.o. female withResults and Hepatitis C    Chronic hepatitis C without hepatic coma- will stage patient's liver disease.  Suspect patient is low stage given her age but needs further evaluation.  I think she should be treated for hepatitis C given that she is of childbearing age.  -would like to use  Epclusa for 12 weeks  -     Comprehensive metabolic panel; Future; Expected date: 03/20/2019  -     CBC auto differential; Future; Expected date: 03/20/2019  -     Hepatitis B core antibody, total; Future; Expected date: 03/20/2019  -     US Elastography Liver; Future; Expected date: 03/20/2019  -     Hepatitis B surface antibody; Future; Expected date: 03/20/2019  -     Hepatitis A antibody, IgG; Future; Expected date: 03/20/2019    Liver lesion-no lesion seen on MRI.  Low concern related to liver lesion.  -will follow up with ultrasound in August    Return to clinic in 6 months      Alda Vernon MD

## 2019-03-21 LAB
HBV CORE AB SERPL QL IA: POSITIVE
HBV SURFACE AB SER-ACNC: POSITIVE M[IU]/ML
HEPATITIS A ANTIBODY, IGG: NEGATIVE

## 2019-03-22 ENCOUNTER — PATIENT MESSAGE (OUTPATIENT)
Dept: GASTROENTEROLOGY | Facility: CLINIC | Age: 29
End: 2019-03-22

## 2019-03-24 ENCOUNTER — PATIENT MESSAGE (OUTPATIENT)
Dept: GASTROENTEROLOGY | Facility: CLINIC | Age: 29
End: 2019-03-24

## 2019-03-24 DIAGNOSIS — Z23 NEED FOR HEPATITIS A VACCINATION: Primary | ICD-10-CM

## 2019-03-29 ENCOUNTER — PROCEDURE VISIT (OUTPATIENT)
Dept: GASTROENTEROLOGY | Facility: CLINIC | Age: 29
End: 2019-03-29
Attending: INTERNAL MEDICINE
Payer: MEDICAID

## 2019-03-29 VITALS
HEART RATE: 66 BPM | WEIGHT: 172.63 LBS | HEIGHT: 68 IN | DIASTOLIC BLOOD PRESSURE: 70 MMHG | SYSTOLIC BLOOD PRESSURE: 120 MMHG | BODY MASS INDEX: 26.16 KG/M2

## 2019-03-29 DIAGNOSIS — Z23 NEED FOR HEPATITIS A VACCINATION: ICD-10-CM

## 2019-03-29 DIAGNOSIS — B18.2 CHRONIC HEPATITIS C WITHOUT HEPATIC COMA: ICD-10-CM

## 2019-03-29 PROCEDURE — 91200 PR LIVER ELASTOGRAPHY W/OUT IMAG W/INTERP & REPORT: ICD-10-PCS | Mod: 26,S$PBB,, | Performed by: INTERNAL MEDICINE

## 2019-03-29 PROCEDURE — 91200 LIVER ELASTOGRAPHY: CPT | Mod: PBBFAC,PN | Performed by: INTERNAL MEDICINE

## 2019-03-29 PROCEDURE — 91200 LIVER ELASTOGRAPHY: CPT | Mod: 26,S$PBB,, | Performed by: INTERNAL MEDICINE

## 2019-03-29 PROCEDURE — 90471 IMMUNIZATION ADMIN: CPT | Mod: PBBFAC,PN

## 2019-03-29 NOTE — PROGRESS NOTES
Pt received her initial Hep A Vaccine today.  She tolerated the injection well and remained in the office for 15 minutes after injection without side effect.

## 2019-04-07 ENCOUNTER — PATIENT MESSAGE (OUTPATIENT)
Dept: GASTROENTEROLOGY | Facility: CLINIC | Age: 29
End: 2019-04-07

## 2019-04-08 ENCOUNTER — TELEPHONE (OUTPATIENT)
Dept: GASTROENTEROLOGY | Facility: CLINIC | Age: 29
End: 2019-04-08

## 2019-04-08 DIAGNOSIS — B18.2 CHRONIC HEPATITIS C WITHOUT HEPATIC COMA: Primary | ICD-10-CM

## 2019-04-08 NOTE — Clinical Note
PLease let patient know prescription for Mavyret was sent since only 8 weeks of therapy would be needed and this is Medicaid's preferred medication.

## 2019-04-08 NOTE — PROGRESS NOTES
Prescription sent for Mavyret for 8 weeks since patient is on Medicaid. But should would benefit from HCV treatment even with low fibrosis score given she is of childbearing age.

## 2019-04-08 NOTE — TELEPHONE ENCOUNTER
fibroscan results phoned to patient, also discussed rx for mavyret sent in and that she would be hearing from mavyret company as they work on prior auth and getting rx approved and delivered and that they would be keeping her up to date throughout process.

## 2019-04-08 NOTE — PROCEDURES
Fibroscan Procedure     Name: Shruthi Riley  Date of Procedure : 2019   :: Alda Vernon MD  Diagnosis: HCV    Probe: M    Fibroscan readin.7 KPa    Fibrosis:F 0-1     CAP readin dB/m    Steatosis: :<S1       Interpretation: No fibrosis or steatosis

## 2019-04-09 ENCOUNTER — TELEPHONE (OUTPATIENT)
Dept: PHARMACY | Facility: CLINIC | Age: 29
End: 2019-04-09

## 2019-05-03 ENCOUNTER — TELEPHONE (OUTPATIENT)
Dept: OBSTETRICS AND GYNECOLOGY | Facility: CLINIC | Age: 29
End: 2019-05-03

## 2019-06-14 ENCOUNTER — PATIENT MESSAGE (OUTPATIENT)
Dept: OBSTETRICS AND GYNECOLOGY | Facility: CLINIC | Age: 29
End: 2019-06-14

## 2019-06-17 ENCOUNTER — TELEPHONE (OUTPATIENT)
Dept: OBSTETRICS AND GYNECOLOGY | Facility: CLINIC | Age: 29
End: 2019-06-17

## 2019-06-17 NOTE — TELEPHONE ENCOUNTER
Spoke to patient and she states she already spoke to one of our nurses to schedule an appointment. Call ended.

## 2019-06-17 NOTE — TELEPHONE ENCOUNTER
----- Message from Sal Perez sent at 6/17/2019  9:00 AM CDT -----  Contact: pt   Pt would like to setup an appt with Ivania العلي for personal issue.          ..[phone]

## 2019-06-27 NOTE — TELEPHONE ENCOUNTER
Patients Mavyret was denied by her insurance. We will be assisting the patient in applying to the . Sending a staff message to Dr Alda Vernon regarding assistance and faxing application prescription for her review and signature.

## 2019-07-05 ENCOUNTER — OFFICE VISIT (OUTPATIENT)
Dept: OBSTETRICS AND GYNECOLOGY | Facility: CLINIC | Age: 29
End: 2019-07-05
Payer: MEDICAID

## 2019-07-05 VITALS — BODY MASS INDEX: 25.26 KG/M2 | HEIGHT: 68 IN | WEIGHT: 166.69 LBS

## 2019-07-05 DIAGNOSIS — N94.10 FEMALE DYSPAREUNIA: Primary | ICD-10-CM

## 2019-07-05 PROBLEM — Z87.51 HISTORY OF PRETERM DELIVERY: Status: RESOLVED | Noted: 2018-05-03 | Resolved: 2019-07-05

## 2019-07-05 PROBLEM — A49.1 GBS (GROUP B STREPTOCOCCUS) INFECTION: Status: RESOLVED | Noted: 2018-12-29 | Resolved: 2019-07-05

## 2019-07-05 PROCEDURE — 99213 PR OFFICE/OUTPT VISIT, EST, LEVL III, 20-29 MIN: ICD-10-PCS | Mod: S$PBB,,, | Performed by: NURSE PRACTITIONER

## 2019-07-05 PROCEDURE — 99213 OFFICE O/P EST LOW 20 MIN: CPT | Mod: PBBFAC | Performed by: NURSE PRACTITIONER

## 2019-07-05 PROCEDURE — 99213 OFFICE O/P EST LOW 20 MIN: CPT | Mod: S$PBB,,, | Performed by: NURSE PRACTITIONER

## 2019-07-05 PROCEDURE — 99999 PR PBB SHADOW E&M-EST. PATIENT-LVL III: CPT | Mod: PBBFAC,,, | Performed by: NURSE PRACTITIONER

## 2019-07-05 PROCEDURE — 99999 PR PBB SHADOW E&M-EST. PATIENT-LVL III: ICD-10-PCS | Mod: PBBFAC,,, | Performed by: NURSE PRACTITIONER

## 2019-07-05 RX ORDER — DEXTROAMPHETAMINE SACCHARATE, AMPHETAMINE ASPARTATE, DEXTROAMPHETAMINE SULFATE AND AMPHETAMINE SULFATE 5; 5; 5; 5 MG/1; MG/1; MG/1; MG/1
1 TABLET ORAL DAILY
Refills: 0 | COMMUNITY
Start: 2019-06-13 | End: 2020-01-27

## 2019-07-05 RX ORDER — ESCITALOPRAM OXALATE 10 MG/1
10 TABLET ORAL DAILY
Refills: 0 | COMMUNITY
Start: 2019-07-02 | End: 2020-01-27

## 2019-07-05 NOTE — PROGRESS NOTES
"CC: Pelvic pressure with intercourse    Shruthi Riley is a 29 y.o. female  presents with c/o pelvic pressure with intercourse.Patient reports that she " feels that something is falling out after the birth of her 6 month old ". Mild pressure with intercourse. No dysuria. Urine in clinic is negative. No pelvic pain.     Past Medical History:   Diagnosis Date    Hepatitis C     History of trichomoniasis     Miscarriage      Past Surgical History:   Procedure Laterality Date    DILATION AND CURETTAGE OF UTERUS      TONSILLECTOMY       Social History     Socioeconomic History    Marital status:      Spouse name: Not on file    Number of children: Not on file    Years of education: Not on file    Highest education level: Not on file   Occupational History    Not on file   Social Needs    Financial resource strain: Not on file    Food insecurity:     Worry: Not on file     Inability: Not on file    Transportation needs:     Medical: Not on file     Non-medical: Not on file   Tobacco Use    Smoking status: Current Every Day Smoker     Packs/day: 0.50     Types: Cigarettes    Smokeless tobacco: Never Used   Substance and Sexual Activity    Alcohol use: No    Drug use: Not Currently     Comment: polysubstance abuser     Sexual activity: Yes     Partners: Male     Birth control/protection: Pill   Lifestyle    Physical activity:     Days per week: Not on file     Minutes per session: Not on file    Stress: Not on file   Relationships    Social connections:     Talks on phone: Not on file     Gets together: Not on file     Attends Mandaen service: Not on file     Active member of club or organization: Not on file     Attends meetings of clubs or organizations: Not on file     Relationship status: Not on file   Other Topics Concern    Not on file   Social History Narrative    Not on file     Family History   Problem Relation Age of Onset    Heart defect Mother      OB History     " "   4    Para   4    Term   2       2    AB   0    Living   3       SAB   0    TAB   0    Ectopic   0    Multiple   0    Live Births   3                 Ht 5' 8" (1.727 m)   Wt 75.6 kg (166 lb 10.7 oz)   LMP 2019   BMI 25.34 kg/m²       ROS:  GENERAL: Denies weight gain or weight loss. Feeling well overall.   SKIN: Denies rash or lesions.   HEAD: Denies head injury or headache.   NODES: Denies enlarged lymph nodes.   CHEST: Denies chest pain or shortness of breath.   CARDIOVASCULAR: Denies palpitations or left sided chest pain.   ABDOMEN: No abdominal pain, constipation, diarrhea, nausea, vomiting or rectal bleeding.   URINARY: No frequency, dysuria, hematuria, or burning on urination.  REPRODUCTIVE: See HPI.       PHYSICAL EXAM:  APPEARANCE: Well nourished, well developed, in no acute distress.  AFFECT: WNL, alert and oriented x 3  ABDOMEN: Soft.  No tenderness or masses.  No hepatosplenomegaly.  No hernias.  PELVIC: Normal external genitalia without lesions.  Normal hair distribution.  Adequate perineal body, normal urethral meatus.  Vagina moist and well rugated without lesions or discharge.  Cervix pink, without lesions, discharge or tenderness.  No significant cystocele or rectocele.  Bimanual exam shows uterus to be normal size, regular, mobile and nontender.  Adnexa without masses or tenderness.        1. Female dyspareunia  Ambulatory consult to Physical Therapy     PLAN:  Exam was unremarkable  PT for pelvic floor training             "

## 2019-07-15 NOTE — TELEPHONE ENCOUNTER
Patient was approved to receive her Mavyret from [employer] through 1-7-2020 (8 weeks). Patient has been informed.     Sending a staff message to Dr Alda Vernon regarding approval

## 2019-07-18 ENCOUNTER — TELEPHONE (OUTPATIENT)
Dept: GASTROENTEROLOGY | Facility: HOSPITAL | Age: 29
End: 2019-07-18

## 2019-07-18 DIAGNOSIS — B18.2 CHRONIC HEPATITIS C WITHOUT HEPATIC COMA: Primary | ICD-10-CM

## 2019-07-18 NOTE — TELEPHONE ENCOUNTER
Patient is on estrogen containing birth control, need to see if she is still taking. Will ask pharmD to eval as this could be a drug interaction with Mavyret. Need to see if patient can stop birth control and use another form of birth control while on Mavyret for 8 weeks. Check CMP, CBC and HCV at end of treatment and 12 weeks later with follow up appoint after the last set of labs.

## 2019-07-18 NOTE — TELEPHONE ENCOUNTER
----- Message from Shanta Lewis PharmD sent at 7/16/2019  2:16 PM CDT -----  Regarding: Mavyret Start Date  Ms. Riley plans to start Mavyret on 7/20.

## 2019-08-28 ENCOUNTER — TELEPHONE (OUTPATIENT)
Dept: RADIOLOGY | Facility: HOSPITAL | Age: 29
End: 2019-08-28

## 2019-08-29 ENCOUNTER — APPOINTMENT (OUTPATIENT)
Dept: RADIOLOGY | Facility: HOSPITAL | Age: 29
End: 2019-08-29
Attending: INTERNAL MEDICINE
Payer: MEDICAID

## 2019-08-29 ENCOUNTER — TELEPHONE (OUTPATIENT)
Dept: GASTROENTEROLOGY | Facility: CLINIC | Age: 29
End: 2019-08-29

## 2019-08-29 DIAGNOSIS — R93.5 ABNORMAL ULTRASOUND OF ABDOMEN: ICD-10-CM

## 2019-08-29 DIAGNOSIS — K76.9 LIVER LESION: ICD-10-CM

## 2019-08-29 DIAGNOSIS — B18.2 CHRONIC HEPATITIS C WITHOUT HEPATIC COMA: Primary | ICD-10-CM

## 2019-08-29 PROCEDURE — 76705 US ABDOMEN LIMITED: ICD-10-PCS | Mod: 26,,, | Performed by: RADIOLOGY

## 2019-08-29 PROCEDURE — 76705 ECHO EXAM OF ABDOMEN: CPT | Mod: 26,,, | Performed by: RADIOLOGY

## 2019-08-29 PROCEDURE — 76705 ECHO EXAM OF ABDOMEN: CPT | Mod: TC,PO

## 2019-08-29 NOTE — TELEPHONE ENCOUNTER
----- Message from Kailey Ness sent at 8/29/2019 11:41 AM CDT -----  Contact: Pt   Pt called in regards to her Hep C treatment. Pt stated that she is a little yellow and that she had a ultrasound. Pt stated that she does not feel well and may need blood work. Pt was wondering if she could go the [facility] location for the lab work. Pt can be reached at [phone] (kldc) .

## 2019-09-01 ENCOUNTER — PATIENT MESSAGE (OUTPATIENT)
Dept: GASTROENTEROLOGY | Facility: CLINIC | Age: 29
End: 2019-09-01

## 2019-09-01 DIAGNOSIS — K76.9 LIVER LESION: Primary | ICD-10-CM

## 2019-09-01 NOTE — TELEPHONE ENCOUNTER
Ultrasound is stable with no concerning findings.  The lesion that was previously seen is still present but stable.  This nothing concerning.  Labs place for patient to be done to evaluate her not feeling well. Called patient to discuss but voicemail is not said but therefore can't leave a message.

## 2019-09-03 NOTE — TELEPHONE ENCOUNTER
Spoke with patient. Informed of results and scheduled lab work per 's recommendations, patient verbalized understanding, TAWANNA.

## 2019-09-04 ENCOUNTER — LAB VISIT (OUTPATIENT)
Dept: LAB | Facility: HOSPITAL | Age: 29
End: 2019-09-04
Attending: INTERNAL MEDICINE
Payer: MEDICAID

## 2019-09-04 DIAGNOSIS — B18.2 CHRONIC HEPATITIS C WITHOUT HEPATIC COMA: ICD-10-CM

## 2019-09-04 LAB
ALBUMIN SERPL BCP-MCNC: 4.2 G/DL (ref 3.5–5.2)
ALP SERPL-CCNC: 61 U/L (ref 55–135)
ALT SERPL W/O P-5'-P-CCNC: 12 U/L (ref 10–44)
ANION GAP SERPL CALC-SCNC: 9 MMOL/L (ref 8–16)
AST SERPL-CCNC: 14 U/L (ref 10–40)
BASOPHILS # BLD AUTO: 0.04 K/UL (ref 0–0.2)
BASOPHILS NFR BLD: 0.5 % (ref 0–1.9)
BILIRUB SERPL-MCNC: 0.3 MG/DL (ref 0.1–1)
BUN SERPL-MCNC: 11 MG/DL (ref 6–20)
CALCIUM SERPL-MCNC: 9.4 MG/DL (ref 8.7–10.5)
CHLORIDE SERPL-SCNC: 109 MMOL/L (ref 95–110)
CO2 SERPL-SCNC: 23 MMOL/L (ref 23–29)
CREAT SERPL-MCNC: 0.7 MG/DL (ref 0.5–1.4)
DIFFERENTIAL METHOD: ABNORMAL
EOSINOPHIL # BLD AUTO: 0.1 K/UL (ref 0–0.5)
EOSINOPHIL NFR BLD: 1.3 % (ref 0–8)
ERYTHROCYTE [DISTWIDTH] IN BLOOD BY AUTOMATED COUNT: 12.3 % (ref 11.5–14.5)
EST. GFR  (AFRICAN AMERICAN): >60 ML/MIN/1.73 M^2
EST. GFR  (NON AFRICAN AMERICAN): >60 ML/MIN/1.73 M^2
GLUCOSE SERPL-MCNC: 89 MG/DL (ref 70–110)
HCT VFR BLD AUTO: 40.1 % (ref 37–48.5)
HGB BLD-MCNC: 12.8 G/DL (ref 12–16)
LYMPHOCYTES # BLD AUTO: 3.1 K/UL (ref 1–4.8)
LYMPHOCYTES NFR BLD: 41.8 % (ref 18–48)
MCH RBC QN AUTO: 29.4 PG (ref 27–31)
MCHC RBC AUTO-ENTMCNC: 31.9 G/DL (ref 32–36)
MCV RBC AUTO: 92 FL (ref 82–98)
MONOCYTES # BLD AUTO: 0.6 K/UL (ref 0.3–1)
MONOCYTES NFR BLD: 8.3 % (ref 4–15)
NEUTROPHILS # BLD AUTO: 3.6 K/UL (ref 1.8–7.7)
NEUTROPHILS NFR BLD: 48 % (ref 38–73)
NRBC BLD-RTO: 0 /100 WBC
PLATELET # BLD AUTO: 275 K/UL (ref 150–350)
PMV BLD AUTO: 10.5 FL (ref 9.2–12.9)
POTASSIUM SERPL-SCNC: 4.3 MMOL/L (ref 3.5–5.1)
PROT SERPL-MCNC: 6.9 G/DL (ref 6–8.4)
RBC # BLD AUTO: 4.36 M/UL (ref 4–5.4)
SODIUM SERPL-SCNC: 141 MMOL/L (ref 136–145)
WBC # BLD AUTO: 7.51 K/UL (ref 3.9–12.7)

## 2019-09-04 PROCEDURE — 80053 COMPREHEN METABOLIC PANEL: CPT

## 2019-09-04 PROCEDURE — 36415 COLL VENOUS BLD VENIPUNCTURE: CPT | Mod: PO

## 2019-09-04 PROCEDURE — 85025 COMPLETE CBC W/AUTO DIFF WBC: CPT

## 2019-09-16 ENCOUNTER — CLINICAL SUPPORT (OUTPATIENT)
Dept: GASTROENTEROLOGY | Facility: CLINIC | Age: 29
End: 2019-09-16
Payer: MEDICAID

## 2019-09-16 VITALS
HEIGHT: 68 IN | WEIGHT: 161.19 LBS | DIASTOLIC BLOOD PRESSURE: 70 MMHG | HEART RATE: 82 BPM | BODY MASS INDEX: 24.43 KG/M2 | SYSTOLIC BLOOD PRESSURE: 112 MMHG

## 2019-09-16 DIAGNOSIS — Z23 NEED FOR HEPATITIS A VACCINATION: ICD-10-CM

## 2019-09-16 PROCEDURE — 99213 OFFICE O/P EST LOW 20 MIN: CPT | Mod: PBBFAC,25

## 2019-09-16 PROCEDURE — 99999 PR PBB SHADOW E&M-EST. PATIENT-LVL III: CPT | Mod: PBBFAC,,,

## 2019-09-16 PROCEDURE — 99999 PR PBB SHADOW E&M-EST. PATIENT-LVL III: ICD-10-PCS | Mod: PBBFAC,,,

## 2019-09-16 PROCEDURE — 90471 IMMUNIZATION ADMIN: CPT | Mod: PBBFAC

## 2019-09-16 NOTE — PROGRESS NOTES
Administered #2 hep a vacination after verifying allergies, no illness for 24 hours, not pregnant, no reaction with previous vaccination, patient tolerated without any problems.

## 2019-10-14 ENCOUNTER — LAB VISIT (OUTPATIENT)
Dept: LAB | Facility: HOSPITAL | Age: 29
End: 2019-10-14
Attending: INTERNAL MEDICINE
Payer: MEDICAID

## 2019-10-14 DIAGNOSIS — B18.2 CHRONIC HEPATITIS C WITHOUT HEPATIC COMA: ICD-10-CM

## 2019-10-14 LAB
ALBUMIN SERPL BCP-MCNC: 4.1 G/DL (ref 3.5–5.2)
ALP SERPL-CCNC: 58 U/L (ref 55–135)
ALT SERPL W/O P-5'-P-CCNC: 13 U/L (ref 10–44)
ANION GAP SERPL CALC-SCNC: 9 MMOL/L (ref 8–16)
AST SERPL-CCNC: 19 U/L (ref 10–40)
BASOPHILS # BLD AUTO: 0.04 K/UL (ref 0–0.2)
BASOPHILS NFR BLD: 0.6 % (ref 0–1.9)
BILIRUB SERPL-MCNC: 0.3 MG/DL (ref 0.1–1)
BUN SERPL-MCNC: 13 MG/DL (ref 6–20)
CALCIUM SERPL-MCNC: 9 MG/DL (ref 8.7–10.5)
CHLORIDE SERPL-SCNC: 107 MMOL/L (ref 95–110)
CO2 SERPL-SCNC: 24 MMOL/L (ref 23–29)
CREAT SERPL-MCNC: 0.7 MG/DL (ref 0.5–1.4)
DIFFERENTIAL METHOD: NORMAL
EOSINOPHIL # BLD AUTO: 0.1 K/UL (ref 0–0.5)
EOSINOPHIL NFR BLD: 1.9 % (ref 0–8)
ERYTHROCYTE [DISTWIDTH] IN BLOOD BY AUTOMATED COUNT: 12.9 % (ref 11.5–14.5)
EST. GFR  (AFRICAN AMERICAN): >60 ML/MIN/1.73 M^2
EST. GFR  (NON AFRICAN AMERICAN): >60 ML/MIN/1.73 M^2
GLUCOSE SERPL-MCNC: 82 MG/DL (ref 70–110)
HCT VFR BLD AUTO: 37.4 % (ref 37–48.5)
HGB BLD-MCNC: 12.4 G/DL (ref 12–16)
IMM GRANULOCYTES # BLD AUTO: 0.02 K/UL (ref 0–0.04)
IMM GRANULOCYTES NFR BLD AUTO: 0.3 % (ref 0–0.5)
LYMPHOCYTES # BLD AUTO: 2.9 K/UL (ref 1–4.8)
LYMPHOCYTES NFR BLD: 42.9 % (ref 18–48)
MCH RBC QN AUTO: 29.9 PG (ref 27–31)
MCHC RBC AUTO-ENTMCNC: 33.2 G/DL (ref 32–36)
MCV RBC AUTO: 90 FL (ref 82–98)
MONOCYTES # BLD AUTO: 0.5 K/UL (ref 0.3–1)
MONOCYTES NFR BLD: 7.8 % (ref 4–15)
NEUTROPHILS # BLD AUTO: 3.2 K/UL (ref 1.8–7.7)
NEUTROPHILS NFR BLD: 46.5 % (ref 38–73)
NRBC BLD-RTO: 0 /100 WBC
PLATELET # BLD AUTO: 278 K/UL (ref 150–350)
PMV BLD AUTO: 11 FL (ref 9.2–12.9)
POTASSIUM SERPL-SCNC: 4 MMOL/L (ref 3.5–5.1)
PROT SERPL-MCNC: 6.6 G/DL (ref 6–8.4)
RBC # BLD AUTO: 4.15 M/UL (ref 4–5.4)
SODIUM SERPL-SCNC: 140 MMOL/L (ref 136–145)
WBC # BLD AUTO: 6.78 K/UL (ref 3.9–12.7)

## 2019-10-14 PROCEDURE — 80053 COMPREHEN METABOLIC PANEL: CPT

## 2019-10-14 PROCEDURE — 36415 COLL VENOUS BLD VENIPUNCTURE: CPT | Mod: PO

## 2019-10-14 PROCEDURE — 85025 COMPLETE CBC W/AUTO DIFF WBC: CPT

## 2019-10-14 PROCEDURE — 87522 HEPATITIS C REVRS TRNSCRPJ: CPT

## 2019-10-16 ENCOUNTER — PATIENT MESSAGE (OUTPATIENT)
Dept: GASTROENTEROLOGY | Facility: CLINIC | Age: 29
End: 2019-10-16

## 2019-10-17 LAB
HCV RNA SERPL NAA+PROBE-LOG IU: <1.08 LOG (10) IU/ML
HCV RNA SERPL QL NAA+PROBE: NOT DETECTED IU/ML
HCV RNA SPEC NAA+PROBE-ACNC: <12 IU/ML

## 2019-10-21 ENCOUNTER — TELEPHONE (OUTPATIENT)
Dept: GASTROENTEROLOGY | Facility: CLINIC | Age: 29
End: 2019-10-21

## 2019-10-21 NOTE — TELEPHONE ENCOUNTER
----- Message from Na Pulliam sent at 10/21/2019  2:49 PM CDT -----  Contact: Patient  Patient would like the nurse to give her a call back at Ph .[phone].

## 2019-10-22 ENCOUNTER — PATIENT MESSAGE (OUTPATIENT)
Dept: GASTROENTEROLOGY | Facility: CLINIC | Age: 29
End: 2019-10-22

## 2019-10-23 ENCOUNTER — OFFICE VISIT (OUTPATIENT)
Dept: GASTROENTEROLOGY | Facility: CLINIC | Age: 29
End: 2019-10-23
Payer: MEDICAID

## 2019-10-23 VITALS
SYSTOLIC BLOOD PRESSURE: 120 MMHG | HEIGHT: 68 IN | DIASTOLIC BLOOD PRESSURE: 70 MMHG | HEART RATE: 88 BPM | WEIGHT: 162.5 LBS | BODY MASS INDEX: 24.63 KG/M2

## 2019-10-23 DIAGNOSIS — K76.9 LIVER LESION: ICD-10-CM

## 2019-10-23 DIAGNOSIS — B18.2 CHRONIC HEPATITIS C WITHOUT HEPATIC COMA: Primary | ICD-10-CM

## 2019-10-23 PROCEDURE — 99999 PR PBB SHADOW E&M-EST. PATIENT-LVL III: CPT | Mod: PBBFAC,,, | Performed by: INTERNAL MEDICINE

## 2019-10-23 PROCEDURE — 99999 PR PBB SHADOW E&M-EST. PATIENT-LVL III: ICD-10-PCS | Mod: PBBFAC,,, | Performed by: INTERNAL MEDICINE

## 2019-10-23 PROCEDURE — 99213 OFFICE O/P EST LOW 20 MIN: CPT | Mod: S$PBB,,, | Performed by: INTERNAL MEDICINE

## 2019-10-23 PROCEDURE — 99213 PR OFFICE/OUTPT VISIT, EST, LEVL III, 20-29 MIN: ICD-10-PCS | Mod: S$PBB,,, | Performed by: INTERNAL MEDICINE

## 2019-10-23 PROCEDURE — 99213 OFFICE O/P EST LOW 20 MIN: CPT | Mod: PBBFAC | Performed by: INTERNAL MEDICINE

## 2019-10-23 NOTE — PROGRESS NOTES
Subjective:     Shruthi Riley is here for follow up of Hepatitis C      HPI  Since Shruthi Riley's last visit she complete her treatment for hepatitis-C.  She is reports some issues with hand and feet pain and cramping that got worse after medication was started.  Otherwise overall she has been feeling a lot better since been treated for hepatitis-C.    Review of Systems    Objective:     Physical Exam    Computed MELD-Na score unavailable. Necessary lab results were not found in the last year.  Computed MELD score unavailable. Necessary lab results were not found in the last year.    WBC   Date Value Ref Range Status   10/14/2019 6.78 3.90 - 12.70 K/uL Final     Hemoglobin   Date Value Ref Range Status   10/14/2019 12.4 12.0 - 16.0 g/dL Final     Hematocrit   Date Value Ref Range Status   10/14/2019 37.4 37.0 - 48.5 % Final     Platelets   Date Value Ref Range Status   10/14/2019 278 150 - 350 K/uL Final     BUN, Bld   Date Value Ref Range Status   10/14/2019 13 6 - 20 mg/dL Final     Creatinine   Date Value Ref Range Status   10/14/2019 0.7 0.5 - 1.4 mg/dL Final     Glucose   Date Value Ref Range Status   10/14/2019 82 70 - 110 mg/dL Final     Calcium   Date Value Ref Range Status   10/14/2019 9.0 8.7 - 10.5 mg/dL Final     Sodium   Date Value Ref Range Status   10/14/2019 140 136 - 145 mmol/L Final     Potassium   Date Value Ref Range Status   10/14/2019 4.0 3.5 - 5.1 mmol/L Final     Chloride   Date Value Ref Range Status   10/14/2019 107 95 - 110 mmol/L Final     AST   Date Value Ref Range Status   10/14/2019 19 10 - 40 U/L Final     ALT   Date Value Ref Range Status   10/14/2019 13 10 - 44 U/L Final     Alkaline Phosphatase   Date Value Ref Range Status   10/14/2019 58 55 - 135 U/L Final     Total Bilirubin   Date Value Ref Range Status   10/14/2019 0.3 0.1 - 1.0 mg/dL Final     Comment:     For infants and newborns, interpretation of results should be based  on gestational age, weight and in  agreement with clinical  observations.  Premature Infant recommended reference ranges:  Up to 24 hours.............<8.0 mg/dL  Up to 48 hours............<12.0 mg/dL  3-5 days..................<15.0 mg/dL  6-29 days.................<15.0 mg/dL       Albumin   Date Value Ref Range Status   10/14/2019 4.1 3.5 - 5.2 g/dL Final     No results found for: INR      Assessment/Plan:     1. Chronic hepatitis C without hepatic coma    2. Liver lesion      Shruthi Riley is a 29 y.o. female withHepatitis C    Chronic hepatitis C without hepatic coma-status post treatment end of treatment negative.  Will need to check for SVR.  Given the evidence of advanced fibrosis if patient is cured no additional liver follow-up needed.  -     Hepatic function panel; Future; Expected date: 10/23/2019  -     Hepatitis C RNA, quantitative, PCR; Future; Expected date: 10/23/2019    Liver lesion-low concern for significant lesion.  Patient had ultrasound that showed vague lesion but MRI was negative.  Will get repeat ultrasound around feb just to confirm no concerning lesion.    Return to clinic as needed based on the above          Alda Vernon MD

## 2019-12-05 ENCOUNTER — PATIENT MESSAGE (OUTPATIENT)
Dept: OBSTETRICS AND GYNECOLOGY | Facility: CLINIC | Age: 29
End: 2019-12-05

## 2019-12-06 ENCOUNTER — PATIENT MESSAGE (OUTPATIENT)
Dept: OBSTETRICS AND GYNECOLOGY | Facility: CLINIC | Age: 29
End: 2019-12-06

## 2019-12-06 ENCOUNTER — TELEPHONE (OUTPATIENT)
Dept: OBSTETRICS AND GYNECOLOGY | Facility: CLINIC | Age: 29
End: 2019-12-06

## 2019-12-06 RX ORDER — VALACYCLOVIR HYDROCHLORIDE 1 G/1
1000 TABLET, FILM COATED ORAL 2 TIMES DAILY
Qty: 20 TABLET | Refills: 0 | Status: SHIPPED | OUTPATIENT
Start: 2019-12-06 | End: 2020-01-27

## 2019-12-06 NOTE — TELEPHONE ENCOUNTER
My  is picking it up on his way home what can I expect with this medication ?and how are we do we know I need it if no test were done? how long dose a outbreak last? What other things can I prevent from this happening? How often does it happen? Could it be something different? I'm still going to take the medication       ----- Message -----   From: Nurse Gladys HAMMOND   Sent: 12/6/19 2:57 PM   To: Shruthi Riley   Subject: RE: Appointment Request      Ms. Riley:      Did you get the prescription that was sent to your pharmacy today?  Please let us know.      MERY Ravi LPN      ----- Message -----      From: Shruthi Riley      Sent: 12/5/2019 12:46 PM CST        To: Patient Appointment Schedule Request Mailing List   Subject: Appointment Request      Appointment Request From: Shruthi Riley      With Provider: Nirali Batista CNM [[facility]]      Preferred Date Range: Any date 12/6/2019 or later      Preferred Times: Friday Morning      Reason for visit: Pap smear blood worl      Comments:   I think I have herpes outbreaks I'm running low fever

## 2019-12-09 ENCOUNTER — TELEPHONE (OUTPATIENT)
Dept: OBSTETRICS AND GYNECOLOGY | Facility: CLINIC | Age: 29
End: 2019-12-09

## 2019-12-21 NOTE — PROGRESS NOTES
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

## 2020-01-27 ENCOUNTER — LAB VISIT (OUTPATIENT)
Dept: LAB | Facility: HOSPITAL | Age: 30
End: 2020-01-27
Attending: ADVANCED PRACTICE MIDWIFE
Payer: MEDICAID

## 2020-01-27 ENCOUNTER — OFFICE VISIT (OUTPATIENT)
Dept: OBSTETRICS AND GYNECOLOGY | Facility: CLINIC | Age: 30
End: 2020-01-27
Payer: MEDICAID

## 2020-01-27 VITALS
DIASTOLIC BLOOD PRESSURE: 60 MMHG | WEIGHT: 162.06 LBS | SYSTOLIC BLOOD PRESSURE: 104 MMHG | BODY MASS INDEX: 24.56 KG/M2 | HEIGHT: 68 IN

## 2020-01-27 DIAGNOSIS — Z32.01 PREGNANCY EXAMINATION OR TEST, POSITIVE RESULT: Primary | ICD-10-CM

## 2020-01-27 DIAGNOSIS — E04.9 ENLARGED THYROID: ICD-10-CM

## 2020-01-27 DIAGNOSIS — F19.11 HISTORY OF SUBSTANCE ABUSE: ICD-10-CM

## 2020-01-27 DIAGNOSIS — Z32.01 PREGNANCY EXAMINATION OR TEST, POSITIVE RESULT: ICD-10-CM

## 2020-01-27 DIAGNOSIS — B18.2 CHRONIC HEPATITIS C WITHOUT HEPATIC COMA: ICD-10-CM

## 2020-01-27 LAB
BASOPHILS # BLD AUTO: 0.04 K/UL (ref 0–0.2)
BASOPHILS NFR BLD: 0.5 % (ref 0–1.9)
DIFFERENTIAL METHOD: ABNORMAL
EOSINOPHIL # BLD AUTO: 0.1 K/UL (ref 0–0.5)
EOSINOPHIL NFR BLD: 0.8 % (ref 0–8)
ERYTHROCYTE [DISTWIDTH] IN BLOOD BY AUTOMATED COUNT: 13.1 % (ref 11.5–14.5)
HCT VFR BLD AUTO: 40 % (ref 37–48.5)
HGB BLD-MCNC: 12.6 G/DL (ref 12–16)
IMM GRANULOCYTES # BLD AUTO: 0.02 K/UL (ref 0–0.04)
IMM GRANULOCYTES NFR BLD AUTO: 0.2 % (ref 0–0.5)
LYMPHOCYTES # BLD AUTO: 1.7 K/UL (ref 1–4.8)
LYMPHOCYTES NFR BLD: 20.1 % (ref 18–48)
MCH RBC QN AUTO: 29.5 PG (ref 27–31)
MCHC RBC AUTO-ENTMCNC: 31.5 G/DL (ref 32–36)
MCV RBC AUTO: 94 FL (ref 82–98)
MONOCYTES # BLD AUTO: 0.9 K/UL (ref 0.3–1)
MONOCYTES NFR BLD: 10 % (ref 4–15)
NEUTROPHILS # BLD AUTO: 5.9 K/UL (ref 1.8–7.7)
NEUTROPHILS NFR BLD: 68.4 % (ref 38–73)
NRBC BLD-RTO: 0 /100 WBC
PLATELET # BLD AUTO: 219 K/UL (ref 150–350)
PMV BLD AUTO: 10.9 FL (ref 9.2–12.9)
RBC # BLD AUTO: 4.27 M/UL (ref 4–5.4)
THYROPEROXIDASE IGG SERPL-ACNC: <6 IU/ML
WBC # BLD AUTO: 8.59 K/UL (ref 3.9–12.7)

## 2020-01-27 PROCEDURE — 84443 ASSAY THYROID STIM HORMONE: CPT

## 2020-01-27 PROCEDURE — 80076 HEPATIC FUNCTION PANEL: CPT

## 2020-01-27 PROCEDURE — 87491 CHLMYD TRACH DNA AMP PROBE: CPT

## 2020-01-27 PROCEDURE — 84439 ASSAY OF FREE THYROXINE: CPT

## 2020-01-27 PROCEDURE — 86376 MICROSOMAL ANTIBODY EACH: CPT

## 2020-01-27 PROCEDURE — 99213 OFFICE O/P EST LOW 20 MIN: CPT | Mod: PBBFAC,TH,PO,25 | Performed by: ADVANCED PRACTICE MIDWIFE

## 2020-01-27 PROCEDURE — 86703 HIV-1/HIV-2 1 RESULT ANTBDY: CPT

## 2020-01-27 PROCEDURE — 87340 HEPATITIS B SURFACE AG IA: CPT

## 2020-01-27 PROCEDURE — 99999 PR PBB SHADOW E&M-EST. PATIENT-LVL III: CPT | Mod: PBBFAC,,, | Performed by: ADVANCED PRACTICE MIDWIFE

## 2020-01-27 PROCEDURE — 84481 FREE ASSAY (FT-3): CPT

## 2020-01-27 PROCEDURE — 87086 URINE CULTURE/COLONY COUNT: CPT

## 2020-01-27 PROCEDURE — 99204 OFFICE O/P NEW MOD 45 MIN: CPT | Mod: S$PBB,TH,, | Performed by: ADVANCED PRACTICE MIDWIFE

## 2020-01-27 PROCEDURE — 86592 SYPHILIS TEST NON-TREP QUAL: CPT

## 2020-01-27 PROCEDURE — 86762 RUBELLA ANTIBODY: CPT

## 2020-01-27 PROCEDURE — 81025 URINE PREGNANCY TEST: CPT | Mod: PBBFAC,PO | Performed by: ADVANCED PRACTICE MIDWIFE

## 2020-01-27 PROCEDURE — 36415 COLL VENOUS BLD VENIPUNCTURE: CPT | Mod: PO

## 2020-01-27 PROCEDURE — 85025 COMPLETE CBC W/AUTO DIFF WBC: CPT

## 2020-01-27 PROCEDURE — 99999 PR PBB SHADOW E&M-EST. PATIENT-LVL III: ICD-10-PCS | Mod: PBBFAC,,, | Performed by: ADVANCED PRACTICE MIDWIFE

## 2020-01-27 PROCEDURE — 87522 HEPATITIS C REVRS TRNSCRPJ: CPT

## 2020-01-27 PROCEDURE — 99204 PR OFFICE/OUTPT VISIT, NEW, LEVL IV, 45-59 MIN: ICD-10-PCS | Mod: S$PBB,TH,, | Performed by: ADVANCED PRACTICE MIDWIFE

## 2020-01-27 PROCEDURE — 86901 BLOOD TYPING SEROLOGIC RH(D): CPT

## 2020-01-27 NOTE — PROGRESS NOTES
"Shruthi Riley is a 29 y.o. , presents today for amenorrhea.      C/C: amenorrhea  She has not seen any other provider for this pregnancy but was seen at [facility] ED 20 c/o increasing in anger, rage since stopping lexapro and adderall abruptly. Psychiatrist told her to stop all meds since +UPT. Pt crying,  present and supportive. PT is have "flash out" moments, increasing rage towards spouse and nightmares since d/c of meds. Reviewed in depth via up to date potential risks of medications-rec pt continue lexapro at this time, wean off adderall. Has f/u appt with psychiatrist in 2 days. Pt denies suicidal/homicidal ideations.    HPI: Reports amenorrhea since Patient's last menstrual period was 2019 (approximate). Prior to LMP, menses were regular occuring every month.  She is not currently on any contraception. Also denies nausea and vomiting. Has noticed breast tenderness. Denies vaginal bleeding since LMP.    SOCIAL HISTORY: Denies emotional/mental/physical/sexual violence or abuse. Feels safe at home. Accompanied today by spouse who is supportive and concerned    PAP HISTORY: last pap 2018, results-NS  denies any history of abnormal pap smear or STDs.     Reports + long-term chronic medical conditions -hx of substance abuse- on adderall and lexapro under care of psychiatrist, +Hepatitis C treatment under care of MD    Review of patient's allergies indicates:  No Known Allergies  Past Medical History:   Diagnosis Date    Hepatitis C     History of trichomoniasis     Miscarriage      Past Surgical History:   Procedure Laterality Date    DILATION AND CURETTAGE OF UTERUS      TONSILLECTOMY       Past Surgical History:   Procedure Laterality Date    DILATION AND CURETTAGE OF UTERUS      TONSILLECTOMY       OB History    Para Term  AB Living   4 4 2 2 0 3   SAB TAB Ectopic Multiple Live Births   0 0 0 0 3      # Outcome Date GA Lbr Kris/2nd Weight Sex Delivery Anes PTL Lv "   4 Term 18 40w3d  4.082 kg (9 lb) F Vag-Spont EPI N JEN   3  01/01/15 26w0d   U    FD      Birth Comments: IUFD   2 Term 10/10/13 40w0d  1.814 kg (4 lb) M Vag-Spont EPI  JEN      Birth Comments: pt does not have custoday-live in [place]   1  02/21/10 32w0d  3.289 kg (7 lb 4 oz) M Vag-Spont EPI  JEN      Birth Comments: pt does not have custoday-live in [place]     OB History        4    Para   4    Term   2       2    AB   0    Living   3       SAB   0    TAB   0    Ectopic   0    Multiple   0    Live Births   3               Social History     Socioeconomic History    Marital status:      Spouse name: Not on file    Number of children: Not on file    Years of education: Not on file    Highest education level: Not on file   Occupational History    Not on file   Social Needs    Financial resource strain: Not on file    Food insecurity:     Worry: Not on file     Inability: Not on file    Transportation needs:     Medical: Not on file     Non-medical: Not on file   Tobacco Use    Smoking status: Current Every Day Smoker     Packs/day: 0.50     Types: Cigarettes    Smokeless tobacco: Never Used   Substance and Sexual Activity    Alcohol use: No    Drug use: Not Currently     Comment: polysubstance abuser     Sexual activity: Yes     Partners: Male     Birth control/protection: None   Lifestyle    Physical activity:     Days per week: Not on file     Minutes per session: Not on file    Stress: Not on file   Relationships    Social connections:     Talks on phone: Not on file     Gets together: Not on file     Attends Tenriism service: Not on file     Active member of club or organization: Not on file     Attends meetings of clubs or organizations: Not on file     Relationship status: Not on file   Other Topics Concern    Not on file   Social History Narrative    Not on file     Family History   Problem Relation Age of Onset    Heart defect Mother      Social  "History     Substance and Sexual Activity   Sexual Activity Yes    Partners: Male    Birth control/protection: None         Primary Doctor No     ROS:    Constitutional/Gen: Denies fevers, chills, malaise, or weight loss. + fatigue, excessive sleeping, nightmares at night disrupting sleep  Psych: Denies depression, + anxiety, + increase in rage since stopping medications abruptly  Eyes: Denies changes in vision or scotomata  Ears, nose, mouth, throat: Denies sinus tenderness, swelling, or dentition problems  CV/vasc: Denies heart palpitations or edema  Resp: Denies SOB or dyspnea  Breasts: Denies mass, nipple discharge, or trauma.  breast tenderness.  GI: Denies constipation, diarrhea, or vomiting,nausea.  : Denies vaginal discharge, dysuria or pelvic pain.  urinary frequency  MS: Denies weakness, soreness, or changes in ROM    OBJECTIVE:  /60   Ht 5' 8" (1.727 m)   Wt 73.5 kg (162 lb 0.6 oz)   LMP 2019 (Approximate)   BMI 24.64 kg/m²   Constitutional/Gen: NAD, appears stated age, well groomed, crying very upset about how she is feeling off her medications  Neck: supple, no masses, thyroid enlargement noted  Head: normocephalic  Skin: warm and dry w/o rash  Lung: normal resp effort, CTAB  Heart: normal HR, RRR   Breasts: bilaterally--no masses, tenderness, skin changes, or nipple discharge noted  Abdomen: soft, nontender, no masses, and bowel sounds normal, no enlargement  Pelvic: deferred pap current, no c/o bleeding or cramping  Extremities: FROM, with no edema or tenderness.  Neurologic: A&O x 4, non-focal, cranial nerves 2-12 grossly intact  Psych: affect appropriate and without signs of mood, thought or memory difficulty appreciated    UPT + in office    ASSESSMENT:  29 y.o. female  with amenorrhea  Likely at 6w3d per US done at [facility] ED reviewed in care everywhere--- PORTER 20 pt informed    Body mass index is 24.64 kg/m².  Patient Active Problem List   Diagnosis    Chronic " hepatitis C without hepatic coma    History of congenital anomaly of heart    History of substance abuse       PLAN:  1. Amenorrhea  -- + UPT in office, Patient's last menstrual period was 12/08/2019 (approximate). --> Estimated Date of Delivery: 9/18/20  -- Dating US done at [facility] ED  -- Routine serum and urine prenatal labs today    2. Physical exam today. Discussed ASCCP guidelines.      3. BMI 24  -- Discussed IOM recommended weight gain of:   Weight category Pre pre BMI  Recommended TWG   Underweight Less than 18.5 28-40    Normal Weight 18.5-24.9  25-35    Overweight 25-29.9  15-25    Obese   30 and greater  11-20   -- Discussed criteria for delivery at [facility] r/t excessive pre-preg weight or excessive weight gain:   Pre-pregnancy BMI over 40 or excess pregnancy weight gain defined as:   Pre-preg BMI < 18.5; Excess weight gain = > 60 pound   Pre-preg BMI 18.5-24.9;  Excess weight gain = > 53 pounds   Pre-preg BMI 25-29.9;  Excess weight gain = > 38 pounds   Pre-preg BMI > 30;  Excess weight gain = > 30 pounds    4. Discussed nausea and vomiting in pregnancy  -- Education regarding lifestyle and dietary modifications  -- Reviewed use of B6/Unisom prn. Pt will notify us if no relief/worsening symptoms, will consider alternative therapies prn    5. Pregnancy education and couseling; handouts and booklet provided  -- Oriented to practice and anticipated prenatal course of care and how to contact us  -- Precautions/warning signs reviewed  -- Common complaints of pregnancy  -- Routine prenatal labs including HIV  -- Ultrasounds  -- Childbirth education/hospital  -- Nutrition, prepregnant BMI, and recommended weight gain  -- Toxoplasmosis precautions (Cats/Raw Meat)-- no cats  -- Sexual activity and exercise  -- Environmental/Work hazards-- works at [employer]- will excuse from work until reassessment next week. F/u with psych, resume lexapro, wean off adderall  -- Travel  -- Tobacco (Ask, Advise, Assess, Assist, and Arrange),  as well as alcohol and drug use  -- Use of any medications (Including supplements, Vitamins, Herbs, or OTC Drugs)      6. Reviewed warning signs, precautions, and how/when to contact us.     7. RTC x 1 week, call or present sooner prn.     Updated Medication List:  No current outpatient medications on file.     No current facility-administered medications for this visit.

## 2020-01-28 LAB
ABO + RH BLD: NORMAL
ALBUMIN SERPL BCP-MCNC: 3.9 G/DL (ref 3.5–5.2)
ALP SERPL-CCNC: 49 U/L (ref 55–135)
ALT SERPL W/O P-5'-P-CCNC: 9 U/L (ref 10–44)
AST SERPL-CCNC: 14 U/L (ref 10–40)
BACTERIA UR CULT: NORMAL
BILIRUB DIRECT SERPL-MCNC: 0.1 MG/DL (ref 0.1–0.3)
BILIRUB SERPL-MCNC: 0.2 MG/DL (ref 0.1–1)
BLD GP AB SCN CELLS X3 SERPL QL: NORMAL
C TRACH DNA SPEC QL NAA+PROBE: NOT DETECTED
HBV SURFACE AG SERPL QL IA: NEGATIVE
HIV 1+2 AB+HIV1 P24 AG SERPL QL IA: NEGATIVE
N GONORRHOEA DNA SPEC QL NAA+PROBE: NOT DETECTED
PROT SERPL-MCNC: 6.4 G/DL (ref 6–8.4)
RPR SER QL: NORMAL
RUBV IGG SER-ACNC: 32.4 IU/ML
RUBV IGG SER-IMP: REACTIVE
T3FREE SERPL-MCNC: 3.1 PG/ML (ref 2.3–4.2)
T4 FREE SERPL-MCNC: 0.86 NG/DL (ref 0.71–1.51)
TSH SERPL DL<=0.005 MIU/L-ACNC: 1.59 UIU/ML (ref 0.4–4)

## 2020-02-04 ENCOUNTER — INITIAL PRENATAL (OUTPATIENT)
Dept: OBSTETRICS AND GYNECOLOGY | Facility: CLINIC | Age: 30
End: 2020-02-04
Payer: MEDICAID

## 2020-02-04 ENCOUNTER — LAB VISIT (OUTPATIENT)
Dept: LAB | Facility: HOSPITAL | Age: 30
End: 2020-02-04
Attending: ADVANCED PRACTICE MIDWIFE
Payer: MEDICAID

## 2020-02-04 VITALS
SYSTOLIC BLOOD PRESSURE: 110 MMHG | WEIGHT: 161.38 LBS | BODY MASS INDEX: 24.54 KG/M2 | DIASTOLIC BLOOD PRESSURE: 70 MMHG

## 2020-02-04 DIAGNOSIS — F19.11 HISTORY OF SUBSTANCE ABUSE: Primary | ICD-10-CM

## 2020-02-04 DIAGNOSIS — B18.2 CHRONIC HEPATITIS C WITHOUT HEPATIC COMA: ICD-10-CM

## 2020-02-04 DIAGNOSIS — Z34.81 SUPERVISION OF NORMAL INTRAUTERINE PREGNANCY IN MULTIGRAVIDA IN FIRST TRIMESTER: ICD-10-CM

## 2020-02-04 PROCEDURE — 99204 OFFICE O/P NEW MOD 45 MIN: CPT | Mod: TH,S$PBB,, | Performed by: ADVANCED PRACTICE MIDWIFE

## 2020-02-04 PROCEDURE — 99999 PR PBB SHADOW E&M-EST. PATIENT-LVL II: ICD-10-PCS | Mod: PBBFAC,,, | Performed by: ADVANCED PRACTICE MIDWIFE

## 2020-02-04 PROCEDURE — 86696 HERPES SIMPLEX TYPE 2 TEST: CPT

## 2020-02-04 PROCEDURE — 99999 PR PBB SHADOW E&M-EST. PATIENT-LVL II: CPT | Mod: PBBFAC,,, | Performed by: ADVANCED PRACTICE MIDWIFE

## 2020-02-04 PROCEDURE — 99204 PR OFFICE/OUTPT VISIT, NEW, LEVL IV, 45-59 MIN: ICD-10-PCS | Mod: TH,S$PBB,, | Performed by: ADVANCED PRACTICE MIDWIFE

## 2020-02-04 PROCEDURE — 99212 OFFICE O/P EST SF 10 MIN: CPT | Mod: PBBFAC,TH,PO | Performed by: ADVANCED PRACTICE MIDWIFE

## 2020-02-04 PROCEDURE — 36415 COLL VENOUS BLD VENIPUNCTURE: CPT | Mod: PO

## 2020-02-04 RX ORDER — PRAZOSIN HYDROCHLORIDE 2 MG/1
CAPSULE ORAL
Refills: 0 | COMMUNITY
Start: 2019-11-07 | End: 2020-07-28

## 2020-02-04 RX ORDER — ESCITALOPRAM OXALATE 20 MG/1
TABLET ORAL
COMMUNITY
Start: 2020-01-28 | End: 2020-07-28

## 2020-02-04 RX ORDER — HYDROXYZINE PAMOATE 50 MG/1
CAPSULE ORAL
Refills: 2 | COMMUNITY
Start: 2019-12-03 | End: 2020-07-28

## 2020-02-04 RX ORDER — DEXTROAMPHETAMINE SACCHARATE, AMPHETAMINE ASPARTATE, DEXTROAMPHETAMINE SULFATE AND AMPHETAMINE SULFATE 7.5; 7.5; 7.5; 7.5 MG/1; MG/1; MG/1; MG/1
TABLET ORAL
COMMUNITY
Start: 2020-01-03 | End: 2020-07-28

## 2020-02-06 NOTE — PROGRESS NOTES
CHIEF COMPLAINT:   Patient presents with      initial OB        HISTORY OF PRESENT ILLNESS  Shruthi Riley 29 y.o.  presents for initial OB, smiling today, feeling much better, off lexapro for a few days, off adderall  The patient has no complaints today.  No nausea or vomiting. No bleeding or pain.    Partner is supportive of pregnancy.  Lives at home with  and her 3 yo.  No cats at home.  Works at [employer] but applied at [employer].  Denies domestic abuse.  Denies chemical/pesticide/radiation exposure.    OB history:      LMP: Patient's last menstrual period was 2019 (approximate).  EDC: Estimated Date of Delivery:   EGA: 7w4d      Health Maintenance   Topic Date Due    Lipid Panel  1991    Pneumococcal Vaccine (Medium Risk) (1 of 1 - PPSV23) 2010    Pap Smear  2021    TETANUS VACCINE  10/09/2028       Past Medical History:   Diagnosis Date    Hepatitis C     History of trichomoniasis     Miscarriage        Past Surgical History:   Procedure Laterality Date    DILATION AND CURETTAGE OF UTERUS      TONSILLECTOMY         Family History   Problem Relation Age of Onset    Heart defect Mother        Social History     Socioeconomic History    Marital status:      Spouse name: Not on file    Number of children: Not on file    Years of education: Not on file    Highest education level: Not on file   Occupational History    Not on file   Social Needs    Financial resource strain: Not on file    Food insecurity:     Worry: Not on file     Inability: Not on file    Transportation needs:     Medical: Not on file     Non-medical: Not on file   Tobacco Use    Smoking status: Current Every Day Smoker     Packs/day: 0.50     Types: Cigarettes    Smokeless tobacco: Never Used   Substance and Sexual Activity    Alcohol use: No    Drug use: Not Currently     Comment: polysubstance abuser     Sexual activity: Yes     Partners: Male     Birth control/protection:  None   Lifestyle    Physical activity:     Days per week: Not on file     Minutes per session: Not on file    Stress: Not on file   Relationships    Social connections:     Talks on phone: Not on file     Gets together: Not on file     Attends Nondenominational service: Not on file     Active member of club or organization: Not on file     Attends meetings of clubs or organizations: Not on file     Relationship status: Not on file   Other Topics Concern    Not on file   Social History Narrative    Not on file       Current Outpatient Medications   Medication Sig Dispense Refill    escitalopram oxalate (LEXAPRO) 20 MG tablet       dextroamphetamine-amphetamine 30 mg Tab       hydrOXYzine pamoate (VISTARIL) 50 MG Cap   2    prazosin (MINIPRESS) 2 MG Cap   0     No current facility-administered medications for this visit.        Review of patient's allergies indicates:  No Known Allergies      PHYSICAL EXAM   Vitals:    02/04/20 1021   BP: 110/70        PAIN SCALE: 0/10 None    PHYSICAL EXAM    ROS:  GENERAL: No fever, chills, fatigability or weight loss.  CV: Denies chest pain  PULM: Denies shortness of breath or wheezing.  ABDOMEN: Appetite fine. No weight loss. Denies diarrhea, abdominal pain, hematemesis or blood in stool.  URINARY: No flank pain, dysuria or hematuria.  REPRODUCTIVE: No abnormal vaginal bleeding.       PE:   APPEARANCE: Well nourished, well developed, in no acute distress  CHEST:normal effort  CV: Regular rate  ABDOMEN: Soft. No tenderness or masses. No hepatosplenomegaly. No hernias  PELVIC: deferred current no bleeding no pain    A/P:     -      Patient was counseled today on A.C.S. Pap guidelines and recommendations for yearly pelvic exams, mammograms and monthly self breast exams; to see her PCP for other health maintenance and pregnancy.   -      Patient's medications and medical history reviewed with patient and implications in pregnancy.   -      Pregnancy course discussed and 'AtoZ' book  given. Patient was counseled on proper weight gain based on the [place] [employer]'s recommendations based on her pre-pregnancy weight. Discussed foods to avoid in pregnancy (i.e. sushi, fish that are high in mercury, deli meat, and unpasteurized cheeses). Discussed prenatal vitamin options (i.e. stool softener, DHA). Discussed potential medical problems in pregnancy.  -     Discussed risk of Toxoplasmosis transmission from pets and reviewed risk reduction techniques.  -     Chromosomal abnormality risk discussed and available testing offered - desires materni T 21  -     Pt was counseled on exercise in pregnancy and weight gain recommendations.  -     Pt was counseled on travel recommendations and on risks of Zika virus exposure.  Current CDC Zika advisories and prevention techniques were reviewed with pt.  Pt denies any recent international travel and does not plan travel during pregnancy.  Pt reports that partner does not plan travel either.  -     Oriented to practice including CNM collaboration.   -     Follow-up routine OB in 4 wks, discussed timing of materni T21. Declines francois  ? Hx of HSV, labs drawn.   al

## 2020-02-09 LAB
HSV1 IGG SERPL QL IA: NEGATIVE
HSV2 IGG SERPL QL IA: POSITIVE

## 2020-02-10 PROBLEM — B00.9 HSV-2 (HERPES SIMPLEX VIRUS 2) INFECTION: Status: ACTIVE | Noted: 2020-02-10

## 2020-03-02 ENCOUNTER — TELEPHONE (OUTPATIENT)
Dept: RADIOLOGY | Facility: HOSPITAL | Age: 30
End: 2020-03-02

## 2020-03-03 ENCOUNTER — APPOINTMENT (OUTPATIENT)
Dept: RADIOLOGY | Facility: HOSPITAL | Age: 30
End: 2020-03-03
Attending: INTERNAL MEDICINE
Payer: MEDICAID

## 2020-03-03 ENCOUNTER — PROCEDURE VISIT (OUTPATIENT)
Dept: OBSTETRICS AND GYNECOLOGY | Facility: CLINIC | Age: 30
End: 2020-03-03
Payer: MEDICAID

## 2020-03-03 ENCOUNTER — ROUTINE PRENATAL (OUTPATIENT)
Dept: OBSTETRICS AND GYNECOLOGY | Facility: CLINIC | Age: 30
End: 2020-03-03
Payer: MEDICAID

## 2020-03-03 VITALS
SYSTOLIC BLOOD PRESSURE: 112 MMHG | BODY MASS INDEX: 24.76 KG/M2 | WEIGHT: 162.81 LBS | DIASTOLIC BLOOD PRESSURE: 70 MMHG

## 2020-03-03 DIAGNOSIS — Z34.81 SUPERVISION OF NORMAL INTRAUTERINE PREGNANCY IN MULTIGRAVIDA IN FIRST TRIMESTER: ICD-10-CM

## 2020-03-03 DIAGNOSIS — Z87.59 HISTORY OF IUFD: ICD-10-CM

## 2020-03-03 DIAGNOSIS — K76.9 LIVER LESION: ICD-10-CM

## 2020-03-03 DIAGNOSIS — Z87.74 HISTORY OF CONGENITAL ANOMALY OF HEART: ICD-10-CM

## 2020-03-03 DIAGNOSIS — F19.11 HISTORY OF SUBSTANCE ABUSE: ICD-10-CM

## 2020-03-03 DIAGNOSIS — O46.8X1 SUBCHORIONIC HEMATOMA IN FIRST TRIMESTER, SINGLE OR UNSPECIFIED FETUS: ICD-10-CM

## 2020-03-03 DIAGNOSIS — O41.8X10 SUBCHORIONIC HEMATOMA IN FIRST TRIMESTER, SINGLE OR UNSPECIFIED FETUS: ICD-10-CM

## 2020-03-03 DIAGNOSIS — O46.8X1 SUBCHORIONIC HEMATOMA IN FIRST TRIMESTER, SINGLE OR UNSPECIFIED FETUS: Primary | ICD-10-CM

## 2020-03-03 DIAGNOSIS — O41.8X10 SUBCHORIONIC HEMATOMA IN FIRST TRIMESTER, SINGLE OR UNSPECIFIED FETUS: Primary | ICD-10-CM

## 2020-03-03 DIAGNOSIS — M25.579 PAIN IN JOINT INVOLVING ANKLE AND FOOT, UNSPECIFIED LATERALITY: ICD-10-CM

## 2020-03-03 DIAGNOSIS — B18.2 CHRONIC HEPATITIS C WITHOUT HEPATIC COMA: ICD-10-CM

## 2020-03-03 PROCEDURE — 99213 OFFICE O/P EST LOW 20 MIN: CPT | Mod: TH,S$PBB,, | Performed by: ADVANCED PRACTICE MIDWIFE

## 2020-03-03 PROCEDURE — 76801 OB US < 14 WKS SINGLE FETUS: CPT | Mod: 26,S$PBB,, | Performed by: OBSTETRICS & GYNECOLOGY

## 2020-03-03 PROCEDURE — 99999 PR PBB SHADOW E&M-EST. PATIENT-LVL III: CPT | Mod: PBBFAC,,, | Performed by: ADVANCED PRACTICE MIDWIFE

## 2020-03-03 PROCEDURE — 76705 ECHO EXAM OF ABDOMEN: CPT | Mod: TC,PO

## 2020-03-03 PROCEDURE — 99213 OFFICE O/P EST LOW 20 MIN: CPT | Mod: PBBFAC,25,TH,PO | Performed by: ADVANCED PRACTICE MIDWIFE

## 2020-03-03 PROCEDURE — 76705 ECHO EXAM OF ABDOMEN: CPT | Mod: 26,,, | Performed by: RADIOLOGY

## 2020-03-03 PROCEDURE — 99999 PR PBB SHADOW E&M-EST. PATIENT-LVL III: ICD-10-PCS | Mod: PBBFAC,,, | Performed by: ADVANCED PRACTICE MIDWIFE

## 2020-03-03 PROCEDURE — 76705 US ABDOMEN LIMITED: ICD-10-PCS | Mod: 26,,, | Performed by: RADIOLOGY

## 2020-03-03 PROCEDURE — 99213 PR OFFICE/OUTPT VISIT, EST, LEVL III, 20-29 MIN: ICD-10-PCS | Mod: TH,S$PBB,, | Performed by: ADVANCED PRACTICE MIDWIFE

## 2020-03-03 PROCEDURE — 76801 PR US, OB <14WKS, TRANSABD, SINGLE GESTATION: ICD-10-PCS | Mod: 26,S$PBB,, | Performed by: OBSTETRICS & GYNECOLOGY

## 2020-03-03 PROCEDURE — 76801 OB US < 14 WKS SINGLE FETUS: CPT | Mod: PBBFAC,PO | Performed by: OBSTETRICS & GYNECOLOGY

## 2020-03-04 ENCOUNTER — PATIENT MESSAGE (OUTPATIENT)
Dept: OBSTETRICS AND GYNECOLOGY | Facility: CLINIC | Age: 30
End: 2020-03-04

## 2020-03-04 PROBLEM — O46.8X1 SUBCHORIONIC HEMATOMA IN FIRST TRIMESTER: Status: RESOLVED | Noted: 2020-03-03 | Resolved: 2020-03-04

## 2020-03-04 PROBLEM — O41.8X10 SUBCHORIONIC HEMATOMA IN FIRST TRIMESTER: Status: RESOLVED | Noted: 2020-03-03 | Resolved: 2020-03-04

## 2020-03-04 PROBLEM — Z87.59 HISTORY OF IUFD: Status: ACTIVE | Noted: 2020-03-04

## 2020-03-04 NOTE — PROGRESS NOTES
"29 y.o. female  at 11w4d   denies VB, LOF or cramping, concerned about subchorionic hematoma noted on scan at [facility], will f/u US today  Also c/o joint pain tyesha in feet and fingers, discussed pregnancy effects, relaxin release, but pt does have abnormal joint position and fingers are bent in her 5th digit on both hands. Labs were recommended years ago for pt but she never had them drawn. Varicosities noted around ankles bilaterally  Will order RA and lupus labs  Smiling today, emotionally states she is "doing so much better" FOB present all appear happy, pt states she is not taking any medications  TW lbs   Reviewed prenatal labs  Genetic testing Materni T21 and QMS desired will do next OV  Anatomy US discussed will consult MD if MFM referral recommended vs anatomy scan in our dept  Reviewed warning signs, pregnancy precautions and how/when to call.  RTC x 4 wks, call or present sooner prn. al            "

## 2020-03-06 DIAGNOSIS — K76.9 LIVER LESION: Primary | ICD-10-CM

## 2020-03-16 ENCOUNTER — PATIENT MESSAGE (OUTPATIENT)
Dept: GASTROENTEROLOGY | Facility: CLINIC | Age: 30
End: 2020-03-16

## 2020-03-16 DIAGNOSIS — K76.9 LIVER LESION: Primary | ICD-10-CM

## 2020-03-17 ENCOUNTER — PATIENT MESSAGE (OUTPATIENT)
Dept: OBSTETRICS AND GYNECOLOGY | Facility: CLINIC | Age: 30
End: 2020-03-17

## 2020-03-26 ENCOUNTER — PATIENT MESSAGE (OUTPATIENT)
Dept: OBSTETRICS AND GYNECOLOGY | Facility: CLINIC | Age: 30
End: 2020-03-26

## 2020-03-26 ENCOUNTER — TELEPHONE (OUTPATIENT)
Dept: OBSTETRICS AND GYNECOLOGY | Facility: CLINIC | Age: 30
End: 2020-03-26

## 2020-03-26 ENCOUNTER — PATIENT MESSAGE (OUTPATIENT)
Dept: ADMINISTRATIVE | Facility: OTHER | Age: 30
End: 2020-03-26

## 2020-04-02 ENCOUNTER — PATIENT MESSAGE (OUTPATIENT)
Dept: OBSTETRICS AND GYNECOLOGY | Facility: CLINIC | Age: 30
End: 2020-04-02

## 2020-04-03 ENCOUNTER — PATIENT MESSAGE (OUTPATIENT)
Dept: OBSTETRICS AND GYNECOLOGY | Facility: CLINIC | Age: 30
End: 2020-04-03

## 2020-04-06 ENCOUNTER — OFFICE VISIT (OUTPATIENT)
Dept: OBSTETRICS AND GYNECOLOGY | Facility: CLINIC | Age: 30
End: 2020-04-06
Payer: MEDICAID

## 2020-04-06 DIAGNOSIS — Z87.74 HISTORY OF CONGENITAL ANOMALY OF HEART: ICD-10-CM

## 2020-04-06 DIAGNOSIS — Z34.81 SUPERVISION OF NORMAL INTRAUTERINE PREGNANCY IN MULTIGRAVIDA IN FIRST TRIMESTER: Primary | ICD-10-CM

## 2020-04-06 DIAGNOSIS — Z87.59 HISTORY OF IUFD: ICD-10-CM

## 2020-04-06 DIAGNOSIS — Z36.89 ENCOUNTER FOR FETAL ANATOMIC SURVEY: ICD-10-CM

## 2020-04-06 PROCEDURE — 99213 PR OFFICE/OUTPT VISIT, EST, LEVL III, 20-29 MIN: ICD-10-PCS | Mod: TH,95,, | Performed by: ADVANCED PRACTICE MIDWIFE

## 2020-04-06 PROCEDURE — 99213 OFFICE O/P EST LOW 20 MIN: CPT | Mod: TH,95,, | Performed by: ADVANCED PRACTICE MIDWIFE

## 2020-04-07 NOTE — PROGRESS NOTES
The patient location is: home  The chief complaint leading to consultation is: routine OB  Visit type: Virtual visit with synchronous audio and video  Total time spent with patient: 8 min  Each patient to whom he or she provides medical services by telemedicine is:  (1) informed of the relationship between the physician and patient and the respective role of any other health care provider with respect to management of the patient; and (2) notified that he or she may decline to receive medical services by telemedicine and may withdraw from such care at any time.    Notes:   29 y.o. female  at 16w3d   Not feeling flutters/FM- reassured, denies VB, LOF or cramping  Doing well without concerns   Reviewed prenatal labs, awaiting on Connected MOM equipment  Genetic testing normal materni T21  Anatomy US ordered- to be done with MFM per Dr. Cano  Reviewed warning signs, pregnancy precautions and how/when to call.  RTC x 4 wks, call or present sooner prn.     Reviewed COVID-19 policies. No visitors in clinic and only 1 visitor for labor and delivery. Encouraged following CDC recommendations. Encouraged social distancing and frequent hand washing. Discussed use of connected mom in pregnancy. al

## 2020-04-22 ENCOUNTER — PATIENT MESSAGE (OUTPATIENT)
Dept: OBSTETRICS AND GYNECOLOGY | Facility: CLINIC | Age: 30
End: 2020-04-22

## 2020-05-01 ENCOUNTER — PATIENT MESSAGE (OUTPATIENT)
Dept: OBSTETRICS AND GYNECOLOGY | Facility: CLINIC | Age: 30
End: 2020-05-01

## 2020-05-03 ENCOUNTER — PATIENT MESSAGE (OUTPATIENT)
Dept: OBSTETRICS AND GYNECOLOGY | Facility: CLINIC | Age: 30
End: 2020-05-03

## 2020-05-04 ENCOUNTER — PROCEDURE VISIT (OUTPATIENT)
Dept: OBSTETRICS AND GYNECOLOGY | Facility: CLINIC | Age: 30
End: 2020-05-04
Payer: MEDICAID

## 2020-05-04 DIAGNOSIS — Z36.89 ENCOUNTER FOR FETAL ANATOMIC SURVEY: ICD-10-CM

## 2020-05-04 DIAGNOSIS — O09.292 PRIOR PREGNANCY WITH CONGENITAL CARDIAC DEFECT IN SECOND TRIMESTER, ANTEPARTUM: ICD-10-CM

## 2020-05-04 DIAGNOSIS — Z87.74 HISTORY OF CONGENITAL ANOMALY OF HEART: ICD-10-CM

## 2020-05-04 DIAGNOSIS — Z87.59 HISTORY OF IUFD: ICD-10-CM

## 2020-05-04 PROCEDURE — 99213 OFFICE O/P EST LOW 20 MIN: CPT | Mod: S$PBB,TH,25, | Performed by: OBSTETRICS & GYNECOLOGY

## 2020-05-04 PROCEDURE — 76811 OB US DETAILED SNGL FETUS: CPT | Mod: 26,S$PBB,, | Performed by: OBSTETRICS & GYNECOLOGY

## 2020-05-04 PROCEDURE — 76811 PR US, OB FETAL EVAL & EXAM, TRANSABDOM,FIRST GESTATION: ICD-10-PCS | Mod: 26,S$PBB,, | Performed by: OBSTETRICS & GYNECOLOGY

## 2020-05-04 PROCEDURE — 99213 PR OFFICE/OUTPT VISIT, EST, LEVL III, 20-29 MIN: ICD-10-PCS | Mod: S$PBB,TH,25, | Performed by: OBSTETRICS & GYNECOLOGY

## 2020-05-04 PROCEDURE — 76811 OB US DETAILED SNGL FETUS: CPT | Mod: PBBFAC | Performed by: OBSTETRICS & GYNECOLOGY

## 2020-05-04 NOTE — PROGRESS NOTES
Indication  ========    Anatomy survey    Method  ======    Transabdominal ultrasound examination    Pregnancy  =========    Pham pregnancy. Number of fetuses: 1    Dating  ======    Ultrasound examination on: 5/4/2020  GA by U/S based upon: AC, BPD, Femur, HC  GA by U/S 21 w + 0 d  PORTER by U/S: 9/14/2020  Assigned: based on ultrasound (CRL), selected on 03/3/2020  Assigned GA 21 w + 1 d  Assigned PORTER: 9/13/2020  Pregnancy length 280 d    General Evaluation  ==============    Cardiac activity present.  bpm.  Fetal movements visualized.  Presentation cephalic.  Placenta posterior.  Umbilical cord 3 vessel cord.  Amniotic fluid normal amount.    Fetal Biometry  ============    Standard  BPD 48.7 mm  20w 5d                Hadlock    OFD 63.8 mm  21w 5d                Katlyn    .0 mm  20w 3d                Hadlock    .8 mm  22w 0d                Hadlock    Femur 34.6 mm  20w 6d                Hadlock    HC / AC 1.05     g          41%        Danny    EFW (lb) 0 lb  EFW (oz) 15 oz  EFW by: Hadlock (BPD-HC-AC-FL)  Head / Face / Neck  Cephalic index 0.76    Extremities / Bony Struc  FL / BPD 0.71    FL / HC 0.19    FL / AC 0.20    Other Structures   bpm    Fetal Anatomy  ============    Cranium: appears normal  Lateral ventricles: normal  Choroid plexus: normal  Midline falx: normal  Cavum septi pellucidi: normal  Cerebellum: normal  Cisterna magna: normal  Head / Neck  Nuchal fold: normal  Lips: normal  Profile: normal  Nose: normal  4-chamber view: normal  RVOT view: normal  LVOT view: normal  Heart / Thorax  Situs: situs solitus (normal)  Aortic arch view: normal  Ductal arch view: normal  SVC: normal  IVC: normal  3-vessel view: normal  Rt lung: normal  Lt lung: normal  Diaphragm: normal  Cord insertion: normal  Stomach: normal  Kidneys: normal  Bladder: normal  Genitals: normal  Abdomen  Rt renal artery: normal  Lt renal artery: normal  Cervical spine: normal  Thoracic  spine: normal  Lumbar spine: normal  Sacral spine: normal  Arms: both visualized  Legs: both visualized  Rt foot: normal  Lt foot: normal  Gender: male  Wants to know gender: yes    Maternal Structures  ===============    Uterus / Cervix  Cervical length 40.6 mm    Consultation  ==========    28 yo  at 20 wk 3 d seen in consultation at request of Nirali Batista CNM due to history of substance abuse, hepatitis C s/p treatment,  and mood disorder.  No ctx, LOF, VB  Maternit21--low risk  PMHX: Hepatitis C--in 2018 viral load >6 log--treated in 2019 and has since had viral clearance and normalization of LFTs; history of psychiatric  illness previously treated with adderall and lexapro--stopped meds hen became pregnant    SurgHx: D&C, tonsillectomy    OBHX:    # Outcome Date GA Lbr Kris/2nd Weight Sex Delivery Anes PTL Lv  5 Current  4 Term 18 40w3d  4.082 kg (9 lb) F Vag-Spont EPI N JEN  3  01/01/15 26w0d   U    FD  Birth Comments: IUFD unk etiology  2 Term 10/10/13 40w0d  1.814 kg (4 lb) M Vag-Spont EPI  JEN  1  02/21/10 32w0d  3.289 kg (7 lb 4 oz) M Vag-Spont EPI  JEN    FMHX: First son was born with transposition of the great vessels and VSD--required several surgeries    Targeted ultrasound evaluation performed. No gross structural malformations noted    1) Hepatitis C:  Discussed that given her prior treatment for hepatitis C and clearance of virus, risk of vertical transmission is minimal as long as she avoid  exposures that could be associated with re-infection with hepatitis C and viremia. Given this viral clearance, no specific alterations in routine  OB care recommended.    2) History of psychiatric illness: She is no longer taking medications for her psychiatric treatment and is doing well. Discussed as well that the  medications she was taking have not been associated with any particular pattern of malformation.    3) Prior child with congential heart defect:  Targeted views reassuring  today, but given severity of prior lesions will arrange for pediatric cardiology consultation and fetal echo.    Total face to face time 15 min, > 50% counseling    Telemedicine MFM Ultrasound Note      Consultation started: 5/4/2020 at 12:25 PM  The chief complaint leading to consultation is: prior child with heart defect; hepatitis C history  The patient location is: [facility]  The patient arrived at: 1030  Spoke nurse at bedside with patient assisting consultant.    Consultation ended: 5/4/2020 at 1240.    Total time spent with patient: < 30 Minutes    Consulting clinician was informed of the encounter and consult note.  Each patient to whom he or she provides medical services by telemedicine is: (1) informed of the relationship between the physician and  patient and the respective role of any other health care provider with respect to management of the patient; and (2) notified that he or she may  decline to receive medical services by telemedicine and may withdraw from such care at any time.      Impression  =========    A detailed fetal anatomic ultrasound examination was performed today due to the following high risk indication: prior child with congenital heart  defect.  No fetal structural abnormalities are identified today, and fetal size is appropriate for EGA. Cervical length is normal. Placental location is  normal without evidence of previa.    Recommendation  ==============    Fetal echocardiogram at 24-28 weeks due to prior child with transposition and VSD.    Thank you again for allowing us to participate in the care of your patients. If you have any questions concerning today's consultation, feel free  to contact me or one of my partners. We can be reached at [phone] during normal business hours. If you have a question after normal  business hours, please contact Labor and Delivery at [phone].

## 2020-05-05 DIAGNOSIS — Z82.79 FAMILY HISTORY OF CONGENITAL HEART DEFECT: Primary | ICD-10-CM

## 2020-05-06 ENCOUNTER — PATIENT MESSAGE (OUTPATIENT)
Dept: RADIOLOGY | Facility: HOSPITAL | Age: 30
End: 2020-05-06

## 2020-05-06 ENCOUNTER — PATIENT MESSAGE (OUTPATIENT)
Dept: GASTROENTEROLOGY | Facility: CLINIC | Age: 30
End: 2020-05-06

## 2020-05-06 ENCOUNTER — TELEPHONE (OUTPATIENT)
Dept: RADIOLOGY | Facility: HOSPITAL | Age: 30
End: 2020-05-06

## 2020-05-08 ENCOUNTER — ROUTINE PRENATAL (OUTPATIENT)
Dept: OBSTETRICS AND GYNECOLOGY | Facility: CLINIC | Age: 30
End: 2020-05-08
Payer: MEDICAID

## 2020-05-08 VITALS — WEIGHT: 177 LBS | SYSTOLIC BLOOD PRESSURE: 106 MMHG | BODY MASS INDEX: 26.92 KG/M2 | DIASTOLIC BLOOD PRESSURE: 58 MMHG

## 2020-05-08 DIAGNOSIS — B18.2 CHRONIC HEPATITIS C WITHOUT HEPATIC COMA: ICD-10-CM

## 2020-05-08 DIAGNOSIS — Z34.81 SUPERVISION OF NORMAL INTRAUTERINE PREGNANCY IN MULTIGRAVIDA IN FIRST TRIMESTER: Primary | ICD-10-CM

## 2020-05-08 PROCEDURE — 99999 PR PBB SHADOW E&M-EST. PATIENT-LVL III: CPT | Mod: PBBFAC,,, | Performed by: ADVANCED PRACTICE MIDWIFE

## 2020-05-08 PROCEDURE — 99213 OFFICE O/P EST LOW 20 MIN: CPT | Mod: PBBFAC,TH | Performed by: ADVANCED PRACTICE MIDWIFE

## 2020-05-08 PROCEDURE — 99213 OFFICE O/P EST LOW 20 MIN: CPT | Mod: TH,S$PBB,, | Performed by: ADVANCED PRACTICE MIDWIFE

## 2020-05-08 PROCEDURE — 99999 PR PBB SHADOW E&M-EST. PATIENT-LVL III: ICD-10-PCS | Mod: PBBFAC,,, | Performed by: ADVANCED PRACTICE MIDWIFE

## 2020-05-08 PROCEDURE — 99213 PR OFFICE/OUTPT VISIT, EST, LEVL III, 20-29 MIN: ICD-10-PCS | Mod: TH,S$PBB,, | Performed by: ADVANCED PRACTICE MIDWIFE

## 2020-05-08 NOTE — PATIENT INSTRUCTIONS
Pelvic Pain in Pregnancy: Unclear (2-3 Trimester)  You are well into your pregnancy and are having pain and pressure in your pelvic area. This is your lower belly (abdomen). Mild pelvic pressure or heaviness is very common in the latter stages of a healthy pregnancy. This is due to the growing uterus (womb). Although the exact cause of your pain is not certain, it does not appear to be dangerous. It may be due to ligaments in your belly stretching to support your uterus as it grows. The weight of your baby may also be causing pressure and pain. Pain can be caused by the bones of your pelvis shifting as your body makes room for the baby to pass through. This is known as symphysis pubis dysfunction (SPD). SPD can cause quite a bit of pain and discomfort as the due date nears.     Pain in the low back is common during pregnancy.   Home care  The following are general care guidelines:  · Avoid strenuous activities and long periods of standing. Bed rest is not needed unless your doctor has recommended it.  · Exercise for 30 minutes all or most days of the week. This will promote muscle tone, strength, and endurance. Ask your healthcare provider what exercises to do and to avoid.  · Sit in a warm (NOT hot) bath. This helps relax tight, painful muscles.  · Sleep on your side with a pillow between your legs. This helps align your pelvis.  · Eat frequent, light meals. Choose foods that are easy to digest.  · Ask your doctor whether a pregnancy support belt could help you.  · If told to by your healthcare provider, take an over-the-counter medicine, such as acetaminophen, to relieve pain. Follow instructions carefully for how much to take and how often to take it. Do not take aspirin or nonsteroidal anti-inflammatory medicines (like ibuprofen) unless you have been told to do so by your healthcare provider.  Follow-up care  Follow up with your healthcare provider, or as advised.  When to seek medical advice  Call your  healthcare provider right away if any of these occur:   · Sudden or gradual worsening abdominal pain  · Fainting, dizziness, or weakness when standing  · Any vaginal bleeding  · Leakage of fluid from the vagina  · Decreased fetal motion  · Repeated vomiting or diarrhea  · Pain that seems to settle in one area, especially the lower right abdomen  · Blood in vomit or bowel movements (dark red or black color)  · Fever of 100.4°F (38°C) or higher  Date Last Reviewed: 6/1/2016  © 6177-9880 [employer]. [address] [place] [address], [place] [address]. All rights reserved. This information is not intended as a substitute for professional medical care. Always follow your healthcare professional's instructions.

## 2020-05-09 NOTE — PROGRESS NOTES
30 y.o. female  at 21w0d   + flutters/FM, denies VB, LOF or cramping  Doing well without concerns, some burning around umbilicus-exp diastasis recti  Reviewed MFM report anatomy scan all normal. Fetal echo rec 24-28 wks pt aware and sched for 20  TW lbs   Reviewed warning signs, normal FM,  labor precautions and how/when to call.  RTC x 4 wks, call or present sooner prn.  al

## 2020-05-12 ENCOUNTER — PATIENT MESSAGE (OUTPATIENT)
Dept: OBSTETRICS AND GYNECOLOGY | Facility: CLINIC | Age: 30
End: 2020-05-12

## 2020-05-25 ENCOUNTER — OFFICE VISIT (OUTPATIENT)
Dept: PEDIATRIC CARDIOLOGY | Facility: CLINIC | Age: 30
End: 2020-05-25
Attending: PEDIATRICS
Payer: MEDICAID

## 2020-05-25 ENCOUNTER — CLINICAL SUPPORT (OUTPATIENT)
Dept: PEDIATRIC CARDIOLOGY | Facility: CLINIC | Age: 30
End: 2020-05-25
Payer: MEDICAID

## 2020-05-25 VITALS
DIASTOLIC BLOOD PRESSURE: 68 MMHG | BODY MASS INDEX: 27.19 KG/M2 | WEIGHT: 179.44 LBS | SYSTOLIC BLOOD PRESSURE: 112 MMHG | HEART RATE: 72 BPM | HEIGHT: 68 IN

## 2020-05-25 DIAGNOSIS — Z82.79 FAMILY HISTORY OF CONGENITAL HEART DEFECT: ICD-10-CM

## 2020-05-25 DIAGNOSIS — Z3A.24 24 WEEKS GESTATION OF PREGNANCY: ICD-10-CM

## 2020-05-25 DIAGNOSIS — Z87.74 HISTORY OF CONGENITAL ANOMALY OF HEART: Primary | ICD-10-CM

## 2020-05-25 PROCEDURE — 76827 PR  SO2 FETAL HEART DOPPLER: ICD-10-PCS | Mod: 26,S$PBB,, | Performed by: PEDIATRICS

## 2020-05-25 PROCEDURE — 93325 DOPPLER ECHO COLOR FLOW MAPG: CPT | Mod: PBBFAC | Performed by: PEDIATRICS

## 2020-05-25 PROCEDURE — 76825 PR  SO2 FETAL HEART: ICD-10-PCS | Mod: 26,S$PBB,, | Performed by: PEDIATRICS

## 2020-05-25 PROCEDURE — 93325 DOPPLER ECHO COLOR FLOW MAPG: CPT | Mod: 26,S$PBB,, | Performed by: PEDIATRICS

## 2020-05-25 PROCEDURE — 99211 OFF/OP EST MAY X REQ PHY/QHP: CPT | Mod: PBBFAC,27,25

## 2020-05-25 PROCEDURE — 76827 ECHO EXAM OF FETAL HEART: CPT | Mod: 26,S$PBB,, | Performed by: PEDIATRICS

## 2020-05-25 PROCEDURE — 99999 PR PBB SHADOW E&M-EST. PATIENT-LVL I: ICD-10-PCS | Mod: PBBFAC,,,

## 2020-05-25 PROCEDURE — 76825 ECHO EXAM OF FETAL HEART: CPT | Mod: 26,S$PBB,, | Performed by: PEDIATRICS

## 2020-05-25 PROCEDURE — 99999 PR PBB SHADOW E&M-EST. PATIENT-LVL III: ICD-10-PCS | Mod: PBBFAC,,, | Performed by: PEDIATRICS

## 2020-05-25 PROCEDURE — 99999 PR PBB SHADOW E&M-EST. PATIENT-LVL III: CPT | Mod: PBBFAC,,, | Performed by: PEDIATRICS

## 2020-05-25 PROCEDURE — 76827 ECHO EXAM OF FETAL HEART: CPT | Mod: PBBFAC | Performed by: PEDIATRICS

## 2020-05-25 PROCEDURE — 99999 PR PBB SHADOW E&M-EST. PATIENT-LVL I: CPT | Mod: PBBFAC,,,

## 2020-05-25 PROCEDURE — 99213 OFFICE O/P EST LOW 20 MIN: CPT | Mod: PBBFAC,25 | Performed by: PEDIATRICS

## 2020-05-25 PROCEDURE — 99203 OFFICE O/P NEW LOW 30 MIN: CPT | Mod: 25,S$PBB,, | Performed by: PEDIATRICS

## 2020-05-25 PROCEDURE — 99203 PR OFFICE/OUTPT VISIT, NEW, LEVL III, 30-44 MIN: ICD-10-PCS | Mod: 25,S$PBB,, | Performed by: PEDIATRICS

## 2020-05-25 PROCEDURE — 93325 PR DOPPLER COLOR FLOW VELOCITY MAP: ICD-10-PCS | Mod: 26,S$PBB,, | Performed by: PEDIATRICS

## 2020-05-25 PROCEDURE — 76825 ECHO EXAM OF FETAL HEART: CPT | Mod: PBBFAC | Performed by: PEDIATRICS

## 2020-05-25 NOTE — PROGRESS NOTES
I had the pleasure of evaluating Shruthi Riley who is now 30 y.o.  and carrying her 5th pregnancy at 24 3/7 weeks gestation. She was referred for evaluation of the fetal heart due to increased risks for congenital heart disease associated with previous child born with transposition, coarctation and multiple VSDs repaired at [facility].  That child now lives with his biological father in [place] and this pregnancy was conceived with a different partner.      Current Outpatient Medications:     dextroamphetamine-amphetamine 30 mg Tab, , Disp: , Rfl:     escitalopram oxalate (LEXAPRO) 20 MG tablet, , Disp: , Rfl:     hydrOXYzine pamoate (VISTARIL) 50 MG Cap, , Disp: , Rfl: 2    prazosin (MINIPRESS) 2 MG Cap, , Disp: , Rfl: 0  Review of patient's allergies indicates:  No Known Allergies    Maternal factors increasing risk for congenital heart disease:  As outlined above with 1 fetal demise.    Paternal factors increasing risk for congenital heart disease:  There is no known history for congenital heart disease in this father.  The father of the previous infant congenital heart disease had a family history of early mitral valve repair and his mother and several other members with congenital heart disease and early heart valve disease.    FETAL ECHOCARDIOGRAM (preliminary):  Normal fetal cardiac connections and function for estimated gestational age.  (Full report in electronic medical record)    I reviewed the strengths and weaknesses of fetal echocardiography including the insufficient sensitivity to rule out many septal defects, anomalies of pulmonary and systemic veins, arch anomalies, and some valvar abnormalities. I also discussed that  resolution of the ductus arteriosus and foramen ovale (normal fetal structures) cannot be assured by fetal evaluation. Finally, I reviewed today's echocardiogram that demonstrates normal anatomical connections and function for the estimated  gestational age. Within the limitations imposed by fetal physiology, I would expect a high probability that this infant will be born with a normal heart.    I discussed the history for congenital heart disease in the previous infant.  The history provided suggests that there was a strong family history for congenital heart disease in that infants father.  With this information, it is quite probable that the familial history from that infants father may have been the primary factor in the development of congenital heart disease in that child.  Without specific genetic testing, it is difficult to prove this suspicion.  In most circumstances, when of the mother has a previous child with congenital heart disease the increased risk for this pregnancy would be about 5% or less compared to an unremarkable pregnancy.  It is possible that the risk would be even lower if the primary factor leading to congenital heart disease in the previous child was the father's family history and genetics.  In any case, I do not see any significant abnormalities of this fetal heart and I think that further evaluation in utero is not necessary unless changes are observed at future studies.  I know that the mother is quite nervous with respect to the possibility of an abnormality in this current pregnancy so I think it isappropriate to plan for an elective echocardiogram to be performed on this infant before discharge from the hospital.      I answered all the mother's questions. I also provided an information sheet reviewing the fetal physiology and how this compares to normal  physiology. This  also includes a reiteration of the limitations of fetal echocardiography that I have discussed today. I have not scheduled another fetal evaluation; however, if questions arise later during gestation or after delivery, I would be happy to assist in any way. Ms. Riley is comfortable with the plan.    RECOMMENDATIONS:  Elective evaluation of  the infant 24 -48 hours after delivery and before discharge from the nursery.  Sooner if there are any signs of hemodynamic compromise.        Note:  Over 50% of visit in consultation with the family >30 minutes

## 2020-05-25 NOTE — LETTER
May 25, 2020        Nirali Batista CNM  [address]  [place] [address]             [id number]  [address]  [facility] [address]  Phone: [phone]  Fax: [phone]   Patient: Shruthi Riley   MR Number: [id number]   YOB: 1990   Date of Visit: 5/25/2020       Dear Dr. Batista:    Thank you for referring Shruthi Riley to me for evaluation. Below are the relevant portions of my assessment and plan of care.            If you have questions, please do not hesitate to call me. I look forward to following Shruthi along with you.    Sincerely,      Alvaro Shine MD           CC  No Recipients

## 2020-06-01 ENCOUNTER — TELEPHONE (OUTPATIENT)
Dept: RADIOLOGY | Facility: HOSPITAL | Age: 30
End: 2020-06-01

## 2020-06-02 ENCOUNTER — HOSPITAL ENCOUNTER (OUTPATIENT)
Dept: RADIOLOGY | Facility: HOSPITAL | Age: 30
Discharge: HOME | End: 2020-06-02
Attending: INTERNAL MEDICINE
Payer: MEDICAID

## 2020-06-02 DIAGNOSIS — K76.9 LIVER LESION: ICD-10-CM

## 2020-06-02 PROCEDURE — 76705 ECHO EXAM OF ABDOMEN: CPT | Mod: TC

## 2020-06-02 PROCEDURE — 76705 US ABDOMEN LIMITED: ICD-10-PCS | Mod: 26,,, | Performed by: RADIOLOGY

## 2020-06-02 PROCEDURE — 76705 ECHO EXAM OF ABDOMEN: CPT | Mod: 26,,, | Performed by: RADIOLOGY

## 2020-06-05 ENCOUNTER — OFFICE VISIT (OUTPATIENT)
Dept: OBSTETRICS AND GYNECOLOGY | Facility: CLINIC | Age: 30
End: 2020-06-05
Payer: MEDICAID

## 2020-06-05 DIAGNOSIS — L81.9 SKIN HYPOPIGMENTATION: ICD-10-CM

## 2020-06-05 DIAGNOSIS — Z87.59 HISTORY OF IUFD: ICD-10-CM

## 2020-06-05 DIAGNOSIS — Z87.74 HISTORY OF CONGENITAL ANOMALY OF HEART: ICD-10-CM

## 2020-06-05 DIAGNOSIS — Z34.80 ENCOUNTER FOR SUPERVISION OF NORMAL INTRAUTERINE PREGNANCY IN MULTIGRAVIDA, ANTEPARTUM: Primary | ICD-10-CM

## 2020-06-05 PROBLEM — Z3A.24 24 WEEKS GESTATION OF PREGNANCY: Status: RESOLVED | Noted: 2020-05-25 | Resolved: 2020-06-05

## 2020-06-05 PROCEDURE — 99213 PR OFFICE/OUTPT VISIT, EST, LEVL III, 20-29 MIN: ICD-10-PCS | Mod: TH,95,, | Performed by: ADVANCED PRACTICE MIDWIFE

## 2020-06-05 PROCEDURE — 99213 OFFICE O/P EST LOW 20 MIN: CPT | Mod: TH,95,, | Performed by: ADVANCED PRACTICE MIDWIFE

## 2020-06-07 NOTE — PROGRESS NOTES
The patient location is:home d/t covid 19  The chief complaint leading to consultation is: routine OB    Visit type: audiovisual    Face to Face time with patient: 9 minutes of total time spent on the encounter, which includes face to face time and non-face to face time preparing to see the patient (eg, review of tests), Obtaining and/or reviewing separately obtained history, Documenting clinical information in the electronic or other health record, Independently interpreting results (not separately reported) and communicating results to the patient/family/caregiver, or Care coordination (not separately reported).         Each patient to whom he or she provides medical services by telemedicine is:  (1) informed of the relationship between the physician and patient and the respective role of any other health care provider with respect to management of the patient; and (2) notified that he or she may decline to receive medical services by telemedicine and may withdraw from such care at any time.    Notes:     30 y.o. female  at 25w0d   Reports + FM, denies VB, LOF, or cramping  Doing well without concerns   Saw Dr. Quan -consult note read, echo on baby rec 24-48 hrs of age  Also pt did make appt with Dr. Vernon  f/u liver lesion, has grown in size, areas of hypopigmentation noted on face, more prominent today but pt is outside and has been in a swimming pool  Will repeat LFTs with autoimmune testing  Connected MOM data sparse, advised pt to follow weekly checks  Reviewed upcoming 28wk labs, (O POS) and orders placed, tdap handout provided and explained  Reviewed warning signs, normal FM,  labor precautions and how/when to call.  RTC x 3 wks, call or present sooner prn. al

## 2020-06-08 DIAGNOSIS — R93.5 ABNORMAL US (ULTRASOUND) OF ABDOMEN: Primary | ICD-10-CM

## 2020-06-10 ENCOUNTER — TELEPHONE (OUTPATIENT)
Dept: OBSTETRICS AND GYNECOLOGY | Facility: CLINIC | Age: 30
End: 2020-06-10

## 2020-06-10 NOTE — TELEPHONE ENCOUNTER
Received phone call from patient, patient is concerned about returning back to work. States that she had sent a message regarding this before, and she just doesn't know what to do. Gave Covid hotline number for pregnant patients, and advised to make sure that she wears a mask, and perform frequent handwashing. Patient also requested for A OPAL Batista to give her a call if she could. Informed that I would pass on the message. Patient voiced understanding.

## 2020-06-11 ENCOUNTER — TELEPHONE (OUTPATIENT)
Dept: OBSTETRICS AND GYNECOLOGY | Facility: CLINIC | Age: 30
End: 2020-06-11

## 2020-06-11 NOTE — TELEPHONE ENCOUNTER
----- Message from Kalie Bonner sent at 6/11/2020 12:25 PM CDT -----  Contact: Self      Name of Who is Calling: CHRISTEN PARR [[id number]]      What is the request in detail: Pt would like a call back from Nirali.Please contact to further discuss and advise.        Can the clinic reply by [facility]: Y      What [facility]: [phone]

## 2020-06-11 NOTE — TELEPHONE ENCOUNTER
Patient has been off during Covid.  She has been called to go back to work.  Patient concerned and has a few questions since she is seeing M for the pregnancy:    1.  Is it advisable for her go back to work due to COVID?    2.  Patient works 12 hours shifts - should that time be limited?    Patient worried since she has child with heart problems already.    Will send to midwife at clinic today for advice.

## 2020-06-11 NOTE — TELEPHONE ENCOUNTER
Called patient and discussed the following:    I looked through the most recent US report with [facility] and this pregnancy looks to be normal without any complication. With that being said, she should be able to return to work without any limitations. Frequent restroom and hydration breaks as applying to general pregnant population and standard Covid-19 precautions should be observed. We may also provide a letter stating no more than 8 hours in the work day if desired. Thank you.   Gloria     Patient advised to wash hands and/or use hand  frequently and to wear a mask.  Patient stated her job gave her 1 more week off to get herself together.  Patient stated she would discuss further with Nirali and at that time get a letter if needed.  Patient denied any further needs.

## 2020-06-25 ENCOUNTER — LAB VISIT (OUTPATIENT)
Dept: LAB | Facility: HOSPITAL | Age: 30
End: 2020-06-25
Payer: MEDICAID

## 2020-06-25 ENCOUNTER — PROCEDURE VISIT (OUTPATIENT)
Dept: OBSTETRICS AND GYNECOLOGY | Facility: CLINIC | Age: 30
End: 2020-06-25
Payer: MEDICAID

## 2020-06-25 ENCOUNTER — ROUTINE PRENATAL (OUTPATIENT)
Dept: OBSTETRICS AND GYNECOLOGY | Facility: CLINIC | Age: 30
End: 2020-06-25
Payer: MEDICAID

## 2020-06-25 VITALS
DIASTOLIC BLOOD PRESSURE: 70 MMHG | SYSTOLIC BLOOD PRESSURE: 118 MMHG | BODY MASS INDEX: 28.63 KG/M2 | WEIGHT: 188.94 LBS

## 2020-06-25 DIAGNOSIS — Z87.74 HISTORY OF CONGENITAL ANOMALY OF HEART: ICD-10-CM

## 2020-06-25 DIAGNOSIS — F19.11 HISTORY OF SUBSTANCE ABUSE: ICD-10-CM

## 2020-06-25 DIAGNOSIS — Z87.59 HISTORY OF IUFD: ICD-10-CM

## 2020-06-25 DIAGNOSIS — B18.2 CHRONIC HEPATITIS C WITHOUT HEPATIC COMA: ICD-10-CM

## 2020-06-25 DIAGNOSIS — Z34.81 SUPERVISION OF NORMAL INTRAUTERINE PREGNANCY IN MULTIGRAVIDA IN FIRST TRIMESTER: Primary | ICD-10-CM

## 2020-06-25 DIAGNOSIS — B00.9 HSV-2 (HERPES SIMPLEX VIRUS 2) INFECTION: ICD-10-CM

## 2020-06-25 DIAGNOSIS — Z34.80 ENCOUNTER FOR SUPERVISION OF NORMAL INTRAUTERINE PREGNANCY IN MULTIGRAVIDA, ANTEPARTUM: ICD-10-CM

## 2020-06-25 PROBLEM — O99.012 ANEMIA DURING PREGNANCY IN SECOND TRIMESTER: Status: ACTIVE | Noted: 2020-06-25

## 2020-06-25 LAB
ALBUMIN SERPL BCP-MCNC: 3.1 G/DL (ref 3.5–5.2)
ALP SERPL-CCNC: 73 U/L (ref 55–135)
ALT SERPL W/O P-5'-P-CCNC: 8 U/L (ref 10–44)
AST SERPL-CCNC: 11 U/L (ref 10–40)
BASOPHILS # BLD AUTO: 0.04 K/UL (ref 0–0.2)
BASOPHILS NFR BLD: 0.3 % (ref 0–1.9)
BILIRUB DIRECT SERPL-MCNC: <0.1 MG/DL (ref 0.1–0.3)
BILIRUB SERPL-MCNC: 0.2 MG/DL (ref 0.1–1)
DIFFERENTIAL METHOD: ABNORMAL
EOSINOPHIL # BLD AUTO: 0.1 K/UL (ref 0–0.5)
EOSINOPHIL NFR BLD: 1.1 % (ref 0–8)
ERYTHROCYTE [DISTWIDTH] IN BLOOD BY AUTOMATED COUNT: 13.6 % (ref 11.5–14.5)
GLUCOSE SERPL-MCNC: 80 MG/DL (ref 70–140)
HCT VFR BLD AUTO: 35.5 % (ref 37–48.5)
HGB BLD-MCNC: 10.9 G/DL (ref 12–16)
IMM GRANULOCYTES # BLD AUTO: 0.09 K/UL (ref 0–0.04)
IMM GRANULOCYTES NFR BLD AUTO: 0.7 % (ref 0–0.5)
LYMPHOCYTES # BLD AUTO: 2.5 K/UL (ref 1–4.8)
LYMPHOCYTES NFR BLD: 18.9 % (ref 18–48)
MCH RBC QN AUTO: 30.6 PG (ref 27–31)
MCHC RBC AUTO-ENTMCNC: 30.7 G/DL (ref 32–36)
MCV RBC AUTO: 100 FL (ref 82–98)
MONOCYTES # BLD AUTO: 1.1 K/UL (ref 0.3–1)
MONOCYTES NFR BLD: 8.1 % (ref 4–15)
NEUTROPHILS # BLD AUTO: 9.3 K/UL (ref 1.8–7.7)
NEUTROPHILS NFR BLD: 70.9 % (ref 38–73)
NRBC BLD-RTO: 0 /100 WBC
PLATELET # BLD AUTO: 217 K/UL (ref 150–350)
PMV BLD AUTO: 10.5 FL (ref 9.2–12.9)
PROT SERPL-MCNC: 6 G/DL (ref 6–8.4)
RBC # BLD AUTO: 3.56 M/UL (ref 4–5.4)
WBC # BLD AUTO: 13.15 K/UL (ref 3.9–12.7)

## 2020-06-25 PROCEDURE — 82950 GLUCOSE TEST: CPT

## 2020-06-25 PROCEDURE — 86703 HIV-1/HIV-2 1 RESULT ANTBDY: CPT

## 2020-06-25 PROCEDURE — 99212 OFFICE O/P EST SF 10 MIN: CPT | Mod: PBBFAC,TH,25 | Performed by: ADVANCED PRACTICE MIDWIFE

## 2020-06-25 PROCEDURE — 99213 OFFICE O/P EST LOW 20 MIN: CPT | Mod: TH,S$PBB,, | Performed by: ADVANCED PRACTICE MIDWIFE

## 2020-06-25 PROCEDURE — 85025 COMPLETE CBC W/AUTO DIFF WBC: CPT

## 2020-06-25 PROCEDURE — 76816 PR  US,PREGNANT UTERUS,F/U,TRANSABD APP: ICD-10-PCS | Mod: 26,S$PBB,, | Performed by: OBSTETRICS & GYNECOLOGY

## 2020-06-25 PROCEDURE — 86592 SYPHILIS TEST NON-TREP QUAL: CPT

## 2020-06-25 PROCEDURE — 76816 OB US FOLLOW-UP PER FETUS: CPT | Mod: PBBFAC | Performed by: OBSTETRICS & GYNECOLOGY

## 2020-06-25 PROCEDURE — 76816 OB US FOLLOW-UP PER FETUS: CPT | Mod: 26,S$PBB,, | Performed by: OBSTETRICS & GYNECOLOGY

## 2020-06-25 PROCEDURE — 99999 PR PBB SHADOW E&M-EST. PATIENT-LVL II: ICD-10-PCS | Mod: PBBFAC,,, | Performed by: ADVANCED PRACTICE MIDWIFE

## 2020-06-25 PROCEDURE — 80076 HEPATIC FUNCTION PANEL: CPT

## 2020-06-25 PROCEDURE — 99213 PR OFFICE/OUTPT VISIT, EST, LEVL III, 20-29 MIN: ICD-10-PCS | Mod: TH,S$PBB,, | Performed by: ADVANCED PRACTICE MIDWIFE

## 2020-06-25 PROCEDURE — 99999 PR PBB SHADOW E&M-EST. PATIENT-LVL II: CPT | Mod: PBBFAC,,, | Performed by: ADVANCED PRACTICE MIDWIFE

## 2020-06-25 PROCEDURE — 36415 COLL VENOUS BLD VENIPUNCTURE: CPT

## 2020-06-25 RX ORDER — FERROUS SULFATE 325(65) MG
325 TABLET ORAL DAILY
Qty: 30 TABLET | Refills: 3 | Status: SHIPPED | OUTPATIENT
Start: 2020-06-25 | End: 2020-08-18 | Stop reason: SDUPTHER

## 2020-06-25 NOTE — PROGRESS NOTES
30 y.o. female  at 27w6d   Reports + FM, denies VB, LOF or CTX  Doing well without concerns  BTL consent signed, aware of r/b/a, permanency  TW lbs   28wk labs today (O POS)  US today EFW 51% vtx, ELBERT normal, all anatomy complete on scan today.  Tdap declines antepartum injection, had it PP after Mayra so covered  Reviewed warning signs, normal FKCs,  labor precautions and how/when to call.  RTC x 2 wks, call or present sooner prn. al

## 2020-06-26 ENCOUNTER — PATIENT MESSAGE (OUTPATIENT)
Dept: OBSTETRICS AND GYNECOLOGY | Facility: CLINIC | Age: 30
End: 2020-06-26

## 2020-06-26 LAB
HIV 1+2 AB+HIV1 P24 AG SERPL QL IA: NEGATIVE
RPR SER QL: NORMAL

## 2020-06-28 RX ORDER — VALACYCLOVIR HYDROCHLORIDE 500 MG/1
500 TABLET, FILM COATED ORAL DAILY
Qty: 30 TABLET | Refills: 3 | Status: SHIPPED | OUTPATIENT
Start: 2020-06-28 | End: 2020-07-28 | Stop reason: SDUPTHER

## 2020-07-01 ENCOUNTER — TELEPHONE (OUTPATIENT)
Dept: OBSTETRICS AND GYNECOLOGY | Facility: CLINIC | Age: 30
End: 2020-07-01

## 2020-07-01 DIAGNOSIS — Z20.822 EXPOSURE TO COVID-19 VIRUS: Primary | ICD-10-CM
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
## 2020-07-06 DIAGNOSIS — R22.2 LOCALIZED SWELLING OF BACK: Primary | ICD-10-CM

## 2020-07-06 DIAGNOSIS — M54.9 BACK PAIN AFFECTING PREGNANCY IN THIRD TRIMESTER: ICD-10-CM

## 2020-07-06 DIAGNOSIS — O99.891 BACK PAIN AFFECTING PREGNANCY IN THIRD TRIMESTER: ICD-10-CM

## 2020-07-06 RX ORDER — CYCLOBENZAPRINE HCL 10 MG
10 TABLET ORAL 3 TIMES DAILY PRN
Qty: 10 TABLET | Refills: 0 | Status: SHIPPED | OUTPATIENT
Start: 2020-07-06 | End: 2020-07-16

## 2020-07-07 ENCOUNTER — ROUTINE PRENATAL (OUTPATIENT)
Dept: OBSTETRICS AND GYNECOLOGY | Facility: CLINIC | Age: 30
End: 2020-07-07
Payer: MEDICAID

## 2020-07-07 VITALS
WEIGHT: 192.88 LBS | BODY MASS INDEX: 29.24 KG/M2 | SYSTOLIC BLOOD PRESSURE: 118 MMHG | DIASTOLIC BLOOD PRESSURE: 72 MMHG

## 2020-07-07 DIAGNOSIS — Z34.81 SUPERVISION OF NORMAL INTRAUTERINE PREGNANCY IN MULTIGRAVIDA IN FIRST TRIMESTER: Primary | ICD-10-CM

## 2020-07-07 DIAGNOSIS — O99.891 BACK PAIN AFFECTING PREGNANCY IN THIRD TRIMESTER: ICD-10-CM

## 2020-07-07 DIAGNOSIS — O99.012 ANEMIA DURING PREGNANCY IN SECOND TRIMESTER: ICD-10-CM

## 2020-07-07 DIAGNOSIS — M54.9 BACK PAIN AFFECTING PREGNANCY IN THIRD TRIMESTER: ICD-10-CM

## 2020-07-07 PROCEDURE — 99999 PR PBB SHADOW E&M-EST. PATIENT-LVL III: CPT | Mod: PBBFAC,,, | Performed by: ADVANCED PRACTICE MIDWIFE

## 2020-07-07 PROCEDURE — 99213 OFFICE O/P EST LOW 20 MIN: CPT | Mod: PBBFAC,TH,PO | Performed by: ADVANCED PRACTICE MIDWIFE

## 2020-07-07 PROCEDURE — 99999 PR PBB SHADOW E&M-EST. PATIENT-LVL III: ICD-10-PCS | Mod: PBBFAC,,, | Performed by: ADVANCED PRACTICE MIDWIFE

## 2020-07-07 PROCEDURE — 99213 PR OFFICE/OUTPT VISIT, EST, LEVL III, 20-29 MIN: ICD-10-PCS | Mod: TH,S$PBB,, | Performed by: ADVANCED PRACTICE MIDWIFE

## 2020-07-07 PROCEDURE — 99213 OFFICE O/P EST LOW 20 MIN: CPT | Mod: TH,S$PBB,, | Performed by: ADVANCED PRACTICE MIDWIFE

## 2020-07-07 NOTE — PATIENT INSTRUCTIONS
Tailor Sit, Trunk Turn for Back Pain During Pregnancy  Before trying these exercises, talk to your healthcare provider to make sure they are safe for you. Ask your healthcare provider how many times to do each exercise.      Tailor sit Trunk turns   Tailor sit  This exercise makes your thigh, pelvic, and hip muscles more flexible.  1. Sit on the floor with the soles of your feet together. Your back should be straight.  2. Gently lean forward until you feel a mild stretch in your hip and thigh muscles. Your back should remain straight. Dont push down on your legs with your hands.  3. Hold and count to 5, then relax.  Trunk turns  This helps make your trunk (from your shoulders to your hips) more flexible.  1. Sit on the floor with your legs crossed. Your back should be straight.  2. Put your left hand on your right knee. Rest your right hand on the floor to support yourself and help you balance.  3. Slowly twist right. To do this, turn your head, shoulders, and chest as far right as you comfortably can. Keep your hips, knees, and feet in place.  4. Hold for 5 counts. Then change sides and slowly twist left.  Date Last Reviewed: 8/16/2015  © 9151-4984 [employer]. [address] [place] [address], [place] [address]. All rights reserved. This information is not intended as a substitute for professional medical care. Always follow your healthcare professional's instructions.        Relieving Back Pain During Pregnancy: Wall Stretch, Body Bend  Before trying these exercises, talk to your healthcare provider to make sure they are safe for you. Ask your healthcare provider how many times to do each exercise.      Wall stretch Body bend   Wall stretch  This strengthens and loosens the muscles in your upper back:  4. Lean against a wall with a firm pillow or rolled towel under your shoulder blades. Your feet should be about 12 inches from the wall and shoulder-width apart. Point your chin down.  5. Breathe in. Push  your shoulders, neck, and head against the wall. You will feel a stretch in your shoulders.  6. Hold for 5 counts. Then breathe out, and relax your shoulders and neck.  Body bends  This strengthens your back and buttocks muscles:  5. Stand with your legs shoulder-width apart. Put your hands on your upper thighs and bend your knees slightly.  6. Slowly bend forward at the hips. Push your hips back and keep your shoulders up. Make sure your back is straight. Youll feel a stretch in your upper thighs. Youll also feel your back muscles holding you in position.  7. Hold for 5 counts, then straighten.  Date Last Reviewed: 8/16/2015  © 1557-6011 [employer]. [address] [place] [address], [place] [address]. All rights reserved. This information is not intended as a substitute for professional medical care. Always follow your healthcare professional's instructions.        Back Pain During Pregnancy: Moving Safely  Learning the proper ways to bend, lift, and carry objects may help relieve back strain. It will also help you protect your back after your baby is born. Remember, if youre having trouble protecting your back, its OK to ask the people around you for help!        Bending Lifting Carrying   Bending  To protect your back as you bend:  · Put 1 foot slightly in front of the other. Bend at the knees and hips, pushing your hips backward. Keep your upper body as straight as you can.  · Face forward. Try to keep your ears, shoulders, and hips in a line.  · Dont hold your breath.  Lifting  To lift a large object or a child:  · Get as close to the load as you can. Face forward, to help keep your ears and shoulders aligned.  · Use the muscles in your thighs and buttocks to stand up. As you lift, tighten your stomach and pelvic floor muscles.  · Dont hold your breath. Avoid twisting.  Carrying  To carry a load safely:  · Carry an object or child in front of you, not resting on your hip.  · Break up your load into 2  smaller bags, if you can. Carry 1 bag on each side to maintain balance. Or, break the load into smaller ones and take more trips.  · Try to tighten your stomach and pelvic floor muscles as you walk. This helps take weight off your back.  Date Last Reviewed: 8/16/2015  © 5818-4916 [employer]. [address] [place] [address], [place] [address]. All rights reserved. This information is not intended as a substitute for professional medical care. Always follow your healthcare professional's instructions.        Back Pain During Pregnancy: Positioning Yourself     When standing, resting a foot on a low block can ease back pain.     You likely position yourself differently now than you did before you were pregnant. Did you know that standing, sitting, or lying in certain ways can lead to back pain? To ease pain, use positions that support your body comfortably.   Tips for good posture  Using good posture means holding yourself so that your spine is aligned and your muscles can work without strain. To use good posture:  · Raise your chest and head. Try to keep your ears lined up over your shoulders.  · Use your stomach muscles to pull in your stomach. This reduces the amount of weight your back must support.  · Keep your pelvis level. Think of your pelvis as a bowl of water that will spill if it tips too far forward or backward.  Standing  If you must stand for long periods, try to change positions every 15 minutes. This gives your muscles a break. When standing, also:  · Keep your legs slightly apart. This helps you balance your weight.  · Rest one foot on a book, ledge, or low stool. Every few minutes, switch legs.  · Wear comfortable shoes with padded soles and arch support, like athletic shoes.  Sitting  When sitting in a chair or car, make sure your spines lumbar curve is supported. Use a chair with lumbar support built in, or put a firm pillow against your lower back. Also try the following:  · Sit with your  knees slightly lower than your hips. Dont cross your legs.  · Take deep breaths often. This helps keep your spine and stomach in the best position.  · Vary your activity each hour. For instance, get up from your desk and take a 5-minute walk around the office.  Lying down  To lie safely and comfortably:  · Lie on your side with your knees slightly bent. This takes pressure off your uterus and improves blood flow to your baby.  · Place pillows under your abdomen and between your knees.  · To get out of bed, roll onto your side. Use your arms to push yourself into a seated position. Scoot to the edge of the bed and place your feet on the floor. Lean forward, then use your leg muscles to stand.  · Consider investing in a firm mattress.  Lifting  Tip to safely lift:  ·  Do not bend over from the waist to pick things up-squat down, bend your knees, and keep your back straight.  Date Last Reviewed: 8/16/2015  © 6184-2958 [employer]. [address] [place] [address]. All rights reserved. This information is not intended as a substitute for professional medical care. Always follow your healthcare professional's instructions.        Adapting to Pregnancy: Third Trimester    Although common during pregnancy, some discomforts may seem worse in the final weeks. Simple lifestyle changes can help. Take care of yourself. And ask your partner to help out with small tasks.  Limiting leg problems  Ways to combat leg issues:  · Wear support hose all day.  · Avoid snug shoes and clothes that bind, like tight pants and socks with elastic tops.  · Sit with your feet and legs raised often.  Caring for your breasts  Tips to follow include:  · Wash with plain water. Avoid using harsh soaps or rubbing alcohol. They may cause dryness.  · Wear a nursing bra for extra support. It can also hide any leaks from your nipples.  Controlling hemorrhoids  Ways to avoid hemorrhoids include:  · Eat foods that are high in fiber.  Also, exercise and drink enough fluids. This will reduce constipation and hemorrhoids.  · Sleep and nap on your side. This limits pressure on the veins of your rectum.  · Try not to stand or sit for long periods.  Controlling back pain  As your body changes during pregnancy, your back must work in new ways. Back pain is due to many causes. Physical changes in your body can strain your back and its supporting muscles. Also, hormones (chemicals that carry messages throughout the body) increase during pregnancy. This can affect how your muscles and joints work together. All of these changes can lead to pain. Pain may be felt in the upper or lower back. Pain is also common in the pelvis. Some pregnant women have sciatica. This is pain caused by pressure on the sciatic nerve running down the back of the leg. Ask your healthcare provider for specific tips and exercises to help control your back pain.  Tips to help you rest  Good rest and sleep will help you feel better. Here are some ideas:  · Ask your partner to massage your shoulders, neck, or back.  · Limit the errands you do each day.  · Lie down in the afternoon or after work for a few minutes.  · Take a warm bath before you go to sleep.  · Drink warm milk or teas without caffeine.  · Avoid coffee, black tea, and cola.  Stopping heartburn  · Avoid spicy or acidic foods.  · Eat small amounts more often. Eat slowly. · Wait 2 hours after eating before lying down.  · Sleep with your upper body raised 6 inches.   Managing mood swings  Ways to manage mood swings include:  · Know that mood changes are normal.  · Exercise often, but get plenty of rest.  · Address any concerns and limit stress. Talking to your partner, other women, or your healthcare provider may help.  Dealing with urinary frequency  Tips to deal with having to urinate often include:  · Drink plenty of water all day. If you drink a lot in the evening, though, you may have to get up more in the  night.  · Limit coffee, black tea, and cola.  Date Last Reviewed: 8/16/2015  © 2362-6271 [employer]. [address] [place] [address]. All rights reserved. This information is not intended as a substitute for professional medical care. Always follow your healthcare professional's instructions.        Pregnancy: Your Third Trimester Changes  As the baby grows, your body changes too. You may also see signs that your body is getting ready for labor. Be patient. Within a few more weeks, your baby will be born.    How you are changing  Your body is preparing for the birth of your baby. Some of the most common changes are listed below. If you have any questions or concerns, ask your healthcare provider:  · Youll gain more weight from fluids, extra blood, and fat deposits.  · Your breasts will grow as your body gets ready to feed the baby. They may be more tender. You may also notice a slight yellow or white discharge from the nipples.  · Discharge from your vagina may increase. This is normal.  · You might see some skin color changes on your forehead, cheeks, or nose. Most of these will go away after you deliver.  How your baby is growing    Month 7  Your baby can open and close his or her eyes, and weighs around 4 pounds. If born prematurely (too early), your baby would likely survive with special care.   Month 8  Your baby is building up body fat, and weighs around 6 pounds.    Month 9  Your baby weighs nearly 7 pounds and is about 18 to 20 inches long. In other words, any day now...   Date Last Reviewed: 8/16/2015  © 4105-4029 [employer]. [address] [place] [address]. All rights reserved. This information is not intended as a substitute for professional medical care. Always follow your healthcare professional's instructions.
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
## 2020-07-20 ENCOUNTER — TELEPHONE (OUTPATIENT)
Dept: OBSTETRICS AND GYNECOLOGY | Facility: CLINIC | Age: 30
End: 2020-07-20

## 2020-07-20 NOTE — TELEPHONE ENCOUNTER
Patient having contractions and not sure if they are L[place] Ordoñez.  Discussed the followin-35 weeks Gestation-     labor contractions are those that are painful uterine contractions.     For these contractions we recommend-  · Drinking plenty of water- go ahead and drink 2 or 3 large glasses of water  · Rest, take a warm bath.   · Keep a record of your contractions. Write down what time each one starts and how long it lasts. A stopwatch is helpful. Look for the pattern of regularly spaced out contractions with a gradual increase in the time each one lasts.  · 4 or more painful contractions, after drinking water and resting, should be further assessed in Labor and Delivery.   · Dont be embarrassed about going to the hospital with a false alarm.   ·   Patient requested appointment tomorrow.  Scheduled.    Patient advised that with any bleeding or leaking to go to Labor and Delivery.    Patient verbalized understanding to all of the above.

## 2020-07-21 ENCOUNTER — ROUTINE PRENATAL (OUTPATIENT)
Dept: OBSTETRICS AND GYNECOLOGY | Facility: CLINIC | Age: 30
End: 2020-07-21
Payer: MEDICAID

## 2020-07-21 VITALS — WEIGHT: 193.31 LBS | BODY MASS INDEX: 29.3 KG/M2 | DIASTOLIC BLOOD PRESSURE: 62 MMHG | SYSTOLIC BLOOD PRESSURE: 116 MMHG

## 2020-07-21 DIAGNOSIS — Z34.80 SUPERVISION OF OTHER NORMAL PREGNANCY: ICD-10-CM

## 2020-07-21 DIAGNOSIS — O47.00 PREMATURE UTERINE CONTRACTIONS: ICD-10-CM

## 2020-07-21 LAB
BILIRUB UR QL STRIP: NEGATIVE
CLARITY UR: CLEAR
COLOR UR: YELLOW
GLUCOSE UR QL STRIP: NEGATIVE
HGB UR QL STRIP: NEGATIVE
KETONES UR QL STRIP: NEGATIVE
LEUKOCYTE ESTERASE UR QL STRIP: NEGATIVE
NITRITE UR QL STRIP: NEGATIVE
PH UR STRIP: 8 [PH] (ref 5–8)
PROT UR QL STRIP: NEGATIVE
SP GR UR STRIP: 1.01 (ref 1–1.03)
URN SPEC COLLECT METH UR: NORMAL

## 2020-07-21 PROCEDURE — 99999 PR PBB SHADOW E&M-EST. PATIENT-LVL III: ICD-10-PCS | Mod: PBBFAC,,, | Performed by: ADVANCED PRACTICE MIDWIFE

## 2020-07-21 PROCEDURE — 82731 ASSAY OF FETAL FIBRONECTIN: CPT

## 2020-07-21 PROCEDURE — 87491 CHLMYD TRACH DNA AMP PROBE: CPT

## 2020-07-21 PROCEDURE — 99999 PR PBB SHADOW E&M-EST. PATIENT-LVL III: CPT | Mod: PBBFAC,,, | Performed by: ADVANCED PRACTICE MIDWIFE

## 2020-07-21 PROCEDURE — 99213 OFFICE O/P EST LOW 20 MIN: CPT | Mod: PBBFAC,TH | Performed by: ADVANCED PRACTICE MIDWIFE

## 2020-07-21 PROCEDURE — 87081 CULTURE SCREEN ONLY: CPT

## 2020-07-21 PROCEDURE — 99213 OFFICE O/P EST LOW 20 MIN: CPT | Mod: TH,S$PBB,, | Performed by: ADVANCED PRACTICE MIDWIFE

## 2020-07-21 PROCEDURE — 99213 PR OFFICE/OUTPT VISIT, EST, LEVL III, 20-29 MIN: ICD-10-PCS | Mod: TH,S$PBB,, | Performed by: ADVANCED PRACTICE MIDWIFE

## 2020-07-21 PROCEDURE — 81003 URINALYSIS AUTO W/O SCOPE: CPT

## 2020-07-21 NOTE — PATIENT INSTRUCTIONS
Fetal Fibronectin  Does this test have other names?  fFN  What is this test?  This test measures the amount of fetal fibronectin (FFN). FFN is a protein made during pregnancy. It's found between the lining of your uterus and the amniotic sac that's protecting your baby. Fetal fibronectin works as a glue to hold the amniotic sac to the uterine lining.  This test helps find out whether you're at risk for premature delivery. The protein is found in cervical and vaginal fluid during the first half of your pregnancy and then disappears. It should reappear only in the last month before you deliver.   Why do I need this test?  Your healthcare provider may order this test if you are at risk of delivering your baby prematurely. Your provider may order this test if you have any symptoms of premature labor. These include:  · Pelvic pressure or cramping  · Dull low backache  · Uterine contractions every 10 minutes or more often  · Change in vaginal discharge  · Thinning or dilation of the cervix  Your provider may also order this test if you don't have symptoms but you have a history of  labor or if you have a short cervix.   What other tests might I have along with this test?  Your healthcare provider may also order a vaginal ultrasound if you test positive for FFN but don't have signs of labor.  What do my test results mean?  Many things may affect your lab test results. These include the method each lab uses to do the test. Even if your test results are different from the normal value, you may not have a problem. To learn what the results mean for you, talk with your healthcare provider.  Your provider will report your results as either positive or negative. Positive means FFN is present. Negative means FFN isn't present.  If you test positive but have no signs of labor, you may be at risk for both premature labor and premature delivery. If you test positive and you have symptoms of premature labor, your healthcare  provider will want to watch you closely.  A negative test result means you're not likely to go into labor within the next 2 weeks.   How is this test done?  The test requires a sample of cervical fluid and is much like a Pap test. Your healthcare provider will put a speculum in your vagina. He or she will take a swab of cervical fluid from the area just outside the opening of your cervix.  Does this test pose any risks?  This test poses no known risks.  What might affect my test results?  Your test results may be affected by a yeast infection, using lubricants or douches, or having had sex within 24 hours of the test. Certain health conditions and certain medicines may cause extra bleeding.  How do I get ready for this test?  You don't need to prepare for this test. But you should avoid having sex within 24 hours of the test. Also avoid putting anything, such as lubricants or douches, into your vagina before the test.              © 0327-3641 "[employer]. [address] [place] [address], [place] [address]. All rights reserved. This information is not intended as a substitute for professional medical care. Always follow your healthcare professional's instructions.        Understanding  Labor  Going into labor before your 37th week of pregnancy is called  labor.  labor can cause your baby to be born too soon. This can lead to a number of health problems that may affect your baby.     Before labor, the cervix is thick and closed.       In  labor, the cervix begins to efface (thin) and dilate (open).      Symptoms of  labor  If you believe youre having  labor, get medical help right away. Contractions alone dont mean youre in  labor. What matters more are changes in your cervix (the lower end of the uterus). Symptoms of  labor include:  · Four or more contractions per hour  · Strong contractions  · Constant menstrual-like cramping  · Low-back pain  · Mucous  or bloody vaginal discharge  · Bleeding or spotting in the second or third trimester  Evaluating  labor  Your healthcare provider will try to find out whether youre in  labor or whether youre just having contractions. He or she may watch you for a few hours. The following tests may be done:  · Pelvic exam to see if your cervix has effaced (thinned) and dilated (opened)  · Uterine activity monitoring to detect contractions  · Fetal monitoring to check the health of your baby  · Ultrasound to check your babys size and position  · Amniocentesis to check how mature your babys lungs are  Caring for yourself at home  If you have  contractions, but your cervix is still thick and closed, your healthcare provider may ask you to do the following at home:  · Drink plenty of water.  · Do fewer activities.  · Rest in bed on your side.  · Avoid intercourse and nipple stimulation.  When to call your healthcare provider  Call your healthcare provider if you notice any of these:  · Four or more contractions per hour  · Bag of water breaks  · Bleeding or spotting   If you need hospital care   labor often requires that you have hospital care and complete bed rest. You may have an IV (intravenous) line to get fluids. You may be given pills or injections to help prevent contractions. Finally, you may receive medicine (corticosteroids) that helps your babys lungs mature more rapidly.  Are you at risk?  Any pregnant woman can have  labor. It may start for no reason. But these risk factors can increase your chances:  · Past  labor or past early birth  · Smoking, drug, or alcohol use during pregnancy  · Multiple fetuses (twins or more)  · Problems with the shape of the uterus  · Bleeding during the pregnancy  The dangers of  birth  A baby born too soon may have health problems. This is because the baby didnt have enough time to mature. Some of the risks for your baby include:  · Not  breastfeeding or feeding well  · Having immature lungs  · Bleeding in the brain  · Dying  Reaching term  Your goal is to get as close to term as you can before giving birth. The closer you get to term, the higher your chance of having a healthy baby. Work with your healthcare provider. Together, you can take steps that may keep you from giving birth too early.  Date Last Reviewed: 6/1/2016  © 0806-5330 [employer]. [address] [place] [address]. All rights reserved. This information is not intended as a substitute for professional medical care. Always follow your healthcare professional's instructions.        Kick Counts    Its normal to worry about your babys health. One way you can know your babys doing well is to record the babys movements once a day. This is called a kick count. Remember to take your kick count records to all your appointments with your healthcare provider.  How to count kicks  Here are tips for counting kicks:  · Choose a time when the baby is active, such as after a meal.   · Sit comfortably or lie on your side.   · The first time the baby moves, write down the time.   · Count each movement until the baby has moved 10 times. This can take from 20 minutes to 2 hours.   · Try to do it at the same time each day.  When to call your healthcare provider  Call your healthcare provider right away if you notice any of the following:  · Your baby moves fewer than 10 times in 2 hours while youre doing kick counts.  · Your baby moves much less often than on the days before.  · You have not felt your baby move all day.  Date Last Reviewed: 8/5/2015  © 3537-8728 [employer]. [address] [place] [address]. All rights reserved. This information is not intended as a substitute for professional medical care. Always follow your healthcare professional's instructions.

## 2020-07-21 NOTE — PROGRESS NOTES
Complaining of increased uterine activity since yesterday.  Last coitus 2 weeks ago.  Denies urinary symptoms.    Abdomen-soft, nontender occasional Weleetka Ordoñez contractions appreciated but mild and irregular.  Good fetal movement also noted.    Speculum exam -normal appearing discharge, GC chlamydia, fetal fibronectin  And GBS obtained.    Wet prep-negative.  Os appears closed.    Digital -cervix is posterior softening, fingertip, 50% with presenting part high.    Will send UA and culture to rule out UTI.  Advised to avoid coitus, drink plenty of fluids If any  contractions drink water,eat and lie down for 30 min ifthey do not resolve- call Labor and delivery or Clinic for advice-verbalizes understanding keep scheduled appointment or follow-up pending results

## 2020-07-22 ENCOUNTER — TELEPHONE (OUTPATIENT)
Dept: GASTROENTEROLOGY | Facility: CLINIC | Age: 30
End: 2020-07-22

## 2020-07-22 ENCOUNTER — OFFICE VISIT (OUTPATIENT)
Dept: GASTROENTEROLOGY | Facility: CLINIC | Age: 30
End: 2020-07-22
Payer: MEDICAID

## 2020-07-22 DIAGNOSIS — K76.9 LIVER LESION: Primary | ICD-10-CM

## 2020-07-22 LAB
C TRACH DNA SPEC QL NAA+PROBE: NOT DETECTED
FIBRONECTIN FETAL SPEC QL: NEGATIVE
N GONORRHOEA DNA SPEC QL NAA+PROBE: NOT DETECTED

## 2020-07-22 PROCEDURE — 99212 PR OFFICE/OUTPT VISIT, EST, LEVL II, 10-19 MIN: ICD-10-PCS | Mod: 95,,, | Performed by: INTERNAL MEDICINE

## 2020-07-22 PROCEDURE — 99212 OFFICE O/P EST SF 10 MIN: CPT | Mod: 95,,, | Performed by: INTERNAL MEDICINE

## 2020-07-22 NOTE — TELEPHONE ENCOUNTER
Spoke with patient and converted appointment to virtual visit. Patient educated on how to check-in for visit.    Patient voiced no additional questions or concerns.

## 2020-07-22 NOTE — PROGRESS NOTES
Subjective:     Shruthi Riley is here for follow up of Liver lesion      HPI  Since Shruthi Riley's last visit patient is pregnant.  She is about 31 weeks pregnant.  We have been following her for liver lesion but the follow-up as some what been complicated by the fact that she is pregnant.  Overall she otherwise has been feeling well.  She denies any other issues related to her liver during this pregnancy.    Review of Systems    Objective:     Physical Exam    Computed MELD-Na score unavailable. Necessary lab results were not found in the last year.  Computed MELD score unavailable. Necessary lab results were not found in the last year.    WBC   Date Value Ref Range Status   06/25/2020 13.15 (H) 3.90 - 12.70 K/uL Final     Hemoglobin   Date Value Ref Range Status   06/25/2020 10.9 (L) 12.0 - 16.0 g/dL Final     Hematocrit   Date Value Ref Range Status   06/25/2020 35.5 (L) 37.0 - 48.5 % Final     Platelets   Date Value Ref Range Status   06/25/2020 217 150 - 350 K/uL Final     BUN, Bld   Date Value Ref Range Status   10/14/2019 13 6 - 20 mg/dL Final     Creatinine   Date Value Ref Range Status   10/14/2019 0.7 0.5 - 1.4 mg/dL Final     Glucose   Date Value Ref Range Status   10/14/2019 82 70 - 110 mg/dL Final     Calcium   Date Value Ref Range Status   10/14/2019 9.0 8.7 - 10.5 mg/dL Final     Sodium   Date Value Ref Range Status   10/14/2019 140 136 - 145 mmol/L Final     Potassium   Date Value Ref Range Status   10/14/2019 4.0 3.5 - 5.1 mmol/L Final     Chloride   Date Value Ref Range Status   10/14/2019 107 95 - 110 mmol/L Final     AST   Date Value Ref Range Status   06/25/2020 11 10 - 40 U/L Final     ALT   Date Value Ref Range Status   06/25/2020 8 (L) 10 - 44 U/L Final     Alkaline Phosphatase   Date Value Ref Range Status   06/25/2020 73 55 - 135 U/L Final     Total Bilirubin   Date Value Ref Range Status   06/25/2020 0.2 0.1 - 1.0 mg/dL Final     Comment:     For infants and newborns,  interpretation of results should be based  on gestational age, weight and in agreement with clinical  observations.  Premature Infant recommended reference ranges:  Up to 24 hours.............<8.0 mg/dL  Up to 48 hours............<12.0 mg/dL  3-5 days..................<15.0 mg/dL  6-29 days.................<15.0 mg/dL       Albumin   Date Value Ref Range Status   06/25/2020 3.1 (L) 3.5 - 5.2 g/dL Final     No results found for: INR      Assessment/Plan:     1. Liver lesion      Shruthi Riley is a 30 y.o. female withLiver lesion    Liver lesion-unclear etiology but suspect is benign.  Lesion is seen on ultrasound was not seen on MRI scan.  Ultrasound it did seem to have grown in March.  Therefore needs further follow-up.  Uncertain if this could be an adenoma and responsive to hormones since the patient is pregnant.    -will get ultrasound moved up to be done as soon as patient can post partum  -would like to compare ultrasound test but suspect that patient will then need cross-sectional imaging with either CT scan or MRI with Eovist to further characterize the lesion    Will determine follow-up based on when imaging is done; ideally would like to see patient a week after cross-sectional imaging is done but that has not been determined at this time.    The patient location is: [place]  The chief complaint leading to consultation is: Liver lesion    Visit type: audiovisual    Face to Face time with patient: 10 minutes  10 minutes of total time spent on the encounter, which includes face to face time and non-face to face time preparing to see the patient (eg, review of tests), Obtaining and/or reviewing separately obtained history, Documenting clinical information in the electronic or other health record, Independently interpreting results (not separately reported) and communicating results to the patient/family/caregiver, or Care coordination (not separately reported).         Each patient to whom he or she  provides medical services by telemedicine is:  (1) informed of the relationship between the physician and patient and the respective role of any other health care provider with respect to management of the patient; and (2) notified that he or she may decline to receive medical services by telemedicine and may withdraw from such care at any time.    Notes:       Alda Vernon MD

## 2020-07-24 ENCOUNTER — TELEPHONE (OUTPATIENT)
Dept: GASTROENTEROLOGY | Facility: CLINIC | Age: 30
End: 2020-07-24

## 2020-07-24 LAB — BACTERIA SPEC AEROBE CULT: NORMAL

## 2020-07-24 NOTE — TELEPHONE ENCOUNTER
Spoke with patient who states that she wants to cancel her ultrasound for now that was set for December. Patient states that she is starting to dilate and is unsure when baby will be born. Patient states that she will contact office back to reschedule after delivery.

## 2020-07-24 NOTE — TELEPHONE ENCOUNTER
----- Message from Alda Vernon MD sent at 7/22/2020  2:40 PM CDT -----  Please let patient move up the date of her ultrasound.  She would like it sooner than December.

## 2020-07-28 ENCOUNTER — ROUTINE PRENATAL (OUTPATIENT)
Dept: OBSTETRICS AND GYNECOLOGY | Facility: CLINIC | Age: 30
End: 2020-07-28
Payer: MEDICAID

## 2020-07-28 VITALS — SYSTOLIC BLOOD PRESSURE: 110 MMHG | WEIGHT: 194.69 LBS | DIASTOLIC BLOOD PRESSURE: 78 MMHG | BODY MASS INDEX: 29.5 KG/M2

## 2020-07-28 DIAGNOSIS — O99.012 ANEMIA DURING PREGNANCY IN SECOND TRIMESTER: ICD-10-CM

## 2020-07-28 DIAGNOSIS — B00.9 HSV-2 (HERPES SIMPLEX VIRUS 2) INFECTION: ICD-10-CM

## 2020-07-28 DIAGNOSIS — Z34.80 SUPERVISION OF OTHER NORMAL PREGNANCY: ICD-10-CM

## 2020-07-28 DIAGNOSIS — O26.843 UTERINE SIZE DATE DISCREPANCY PREGNANCY, THIRD TRIMESTER: Primary | ICD-10-CM

## 2020-07-28 PROCEDURE — 99213 OFFICE O/P EST LOW 20 MIN: CPT | Mod: TH,S$PBB,, | Performed by: ADVANCED PRACTICE MIDWIFE

## 2020-07-28 PROCEDURE — 99213 PR OFFICE/OUTPT VISIT, EST, LEVL III, 20-29 MIN: ICD-10-PCS | Mod: TH,S$PBB,, | Performed by: ADVANCED PRACTICE MIDWIFE

## 2020-07-28 PROCEDURE — 99999 PR PBB SHADOW E&M-EST. PATIENT-LVL II: CPT | Mod: PBBFAC,,, | Performed by: ADVANCED PRACTICE MIDWIFE

## 2020-07-28 PROCEDURE — 99212 OFFICE O/P EST SF 10 MIN: CPT | Mod: PBBFAC,TH,PO | Performed by: ADVANCED PRACTICE MIDWIFE

## 2020-07-28 PROCEDURE — 99999 PR PBB SHADOW E&M-EST. PATIENT-LVL II: ICD-10-PCS | Mod: PBBFAC,,, | Performed by: ADVANCED PRACTICE MIDWIFE

## 2020-07-28 RX ORDER — VALACYCLOVIR HYDROCHLORIDE 500 MG/1
500 TABLET, FILM COATED ORAL DAILY
Qty: 30 TABLET | Refills: 3 | Status: SHIPPED | OUTPATIENT
Start: 2020-07-28 | End: 2020-09-02 | Stop reason: SDUPTHER

## 2020-08-02 NOTE — PROGRESS NOTES
30 y.o. female  at 32w4d   Reports + FM, denies VB, LOF or CTX  Doing well without concerns, states UC have resolved, wants cx re-examined, reviewed labs  TW lbs   Reviewed 28wk lab results (O POS)  Will start valtrex nightly  Reviewed warning signs, normal FKCs,  labor precautions and how/when to call.  RTC x 2 wks, call or present sooner prn.

## 2020-08-11 ENCOUNTER — PROCEDURE VISIT (OUTPATIENT)
Dept: OBSTETRICS AND GYNECOLOGY | Facility: CLINIC | Age: 30
End: 2020-08-11
Payer: MEDICAID

## 2020-08-11 ENCOUNTER — ROUTINE PRENATAL (OUTPATIENT)
Dept: OBSTETRICS AND GYNECOLOGY | Facility: CLINIC | Age: 30
End: 2020-08-11
Payer: MEDICAID

## 2020-08-11 VITALS
WEIGHT: 195.75 LBS | SYSTOLIC BLOOD PRESSURE: 118 MMHG | BODY MASS INDEX: 29.67 KG/M2 | DIASTOLIC BLOOD PRESSURE: 78 MMHG

## 2020-08-11 DIAGNOSIS — Z87.59 HISTORY OF IUFD: ICD-10-CM

## 2020-08-11 DIAGNOSIS — Z34.80 SUPERVISION OF OTHER NORMAL PREGNANCY: Primary | ICD-10-CM

## 2020-08-11 DIAGNOSIS — O99.012 ANEMIA DURING PREGNANCY IN SECOND TRIMESTER: ICD-10-CM

## 2020-08-11 PROCEDURE — 76816 OB US FOLLOW-UP PER FETUS: CPT | Mod: PBBFAC,PO | Performed by: OBSTETRICS & GYNECOLOGY

## 2020-08-11 PROCEDURE — 76819 FETAL BIOPHYS PROFIL W/O NST: CPT | Mod: 26,S$PBB,, | Performed by: OBSTETRICS & GYNECOLOGY

## 2020-08-11 PROCEDURE — 76816 PR  US,PREGNANT UTERUS,F/U,TRANSABD APP: ICD-10-PCS | Mod: 26,S$PBB,, | Performed by: OBSTETRICS & GYNECOLOGY

## 2020-08-11 PROCEDURE — 99213 OFFICE O/P EST LOW 20 MIN: CPT | Mod: TH,S$PBB,, | Performed by: ADVANCED PRACTICE MIDWIFE

## 2020-08-11 PROCEDURE — 76816 OB US FOLLOW-UP PER FETUS: CPT | Mod: 26,S$PBB,, | Performed by: OBSTETRICS & GYNECOLOGY

## 2020-08-11 PROCEDURE — 99999 PR PBB SHADOW E&M-EST. PATIENT-LVL II: ICD-10-PCS | Mod: PBBFAC,,, | Performed by: ADVANCED PRACTICE MIDWIFE

## 2020-08-11 PROCEDURE — 76819 PR US, OB, FETAL BIOPHYSICAL, W/O NST: ICD-10-PCS | Mod: 26,S$PBB,, | Performed by: OBSTETRICS & GYNECOLOGY

## 2020-08-11 PROCEDURE — 99213 PR OFFICE/OUTPT VISIT, EST, LEVL III, 20-29 MIN: ICD-10-PCS | Mod: TH,S$PBB,, | Performed by: ADVANCED PRACTICE MIDWIFE

## 2020-08-11 PROCEDURE — 99212 OFFICE O/P EST SF 10 MIN: CPT | Mod: PBBFAC,TH,PO | Performed by: ADVANCED PRACTICE MIDWIFE

## 2020-08-11 PROCEDURE — 99999 PR PBB SHADOW E&M-EST. PATIENT-LVL II: CPT | Mod: PBBFAC,,, | Performed by: ADVANCED PRACTICE MIDWIFE

## 2020-08-11 PROCEDURE — 76819 FETAL BIOPHYS PROFIL W/O NST: CPT | Mod: PBBFAC,PO | Performed by: OBSTETRICS & GYNECOLOGY

## 2020-08-11 NOTE — PROGRESS NOTES
"  30 y.o. female  at 34w4d   Reports + FM, denies VB, LOF or regular CTX  Doing well, had intercourse on Friday, had vaginal bleeding "more than I have ever seen before during pregnancy", called, resolved with rest, none since  cx soft 1.5 cm, exp pelvic rest for the next wee  US today hx of IUFD- BPP ,  EFW 7# but says 69% ELBERT normal, vtx  TW lbs   Reviewed warning signs, normal FKCs, labor precautions and how/when to call.  RTC x 1wks, call or present sooner prn.       "

## 2020-08-17 ENCOUNTER — TELEPHONE (OUTPATIENT)
Dept: OBSTETRICS AND GYNECOLOGY | Facility: CLINIC | Age: 30
End: 2020-08-17

## 2020-08-17 NOTE — TELEPHONE ENCOUNTER
----- Message from Na Pulliam sent at 8/17/2020  4:07 PM CDT -----  Regarding: Missed call  Contact: Patient  .Type:  Patient Returning Call    Who Called:Patient  Who Left Message for Patient: Nirali  Does the patient know what this is regarding?: Missed call   Would the patient rather a call back or a response via [facility]? Call  Best Call Back Number: .[phone] (home)     Additional Information:

## 2020-08-17 NOTE — TELEPHONE ENCOUNTER
----- Message from Nirali Brown sent at 8/17/2020  1:17 PM CDT -----  Regarding: Pt advice  Contact: Pt  Type:  Needs Medical Advice    Who Called: Shruthi Riley   Symptoms (please be specific): dental pain    How long has patient had these symptoms:  last night   Pharmacy name and phone #:      [facility] [place] - [address] [facility]  [address] [address]  Phone: [phone] Fax: [phone]    Would the patient rather a call back or a response via [facility]? Call back   Best Call Back Number: [phone]  Additional Information:

## 2020-08-17 NOTE — TELEPHONE ENCOUNTER
Pt states that she has what she thinks is an abscess in her mouth. Pt States that she has an appointment with Nirali Batista in the morning and will have her look at it to see if she can call something in.

## 2020-08-18 ENCOUNTER — PROCEDURE VISIT (OUTPATIENT)
Dept: OBSTETRICS AND GYNECOLOGY | Facility: CLINIC | Age: 30
End: 2020-08-18
Payer: MEDICAID

## 2020-08-18 ENCOUNTER — ROUTINE PRENATAL (OUTPATIENT)
Dept: OBSTETRICS AND GYNECOLOGY | Facility: CLINIC | Age: 30
End: 2020-08-18
Payer: MEDICAID

## 2020-08-18 VITALS
DIASTOLIC BLOOD PRESSURE: 73 MMHG | BODY MASS INDEX: 29.74 KG/M2 | WEIGHT: 196.19 LBS | SYSTOLIC BLOOD PRESSURE: 120 MMHG

## 2020-08-18 DIAGNOSIS — Z34.80 SUPERVISION OF OTHER NORMAL PREGNANCY: Primary | ICD-10-CM

## 2020-08-18 DIAGNOSIS — B18.2 CHRONIC HEPATITIS C WITHOUT HEPATIC COMA: ICD-10-CM

## 2020-08-18 DIAGNOSIS — Z87.59 HISTORY OF IUFD: ICD-10-CM

## 2020-08-18 DIAGNOSIS — O99.012 ANEMIA DURING PREGNANCY IN SECOND TRIMESTER: ICD-10-CM

## 2020-08-18 PROBLEM — O47.00 PREMATURE UTERINE CONTRACTIONS: Status: RESOLVED | Noted: 2020-07-21 | Resolved: 2020-08-18

## 2020-08-18 PROCEDURE — 99999 PR PBB SHADOW E&M-EST. PATIENT-LVL II: CPT | Mod: PBBFAC,,, | Performed by: ADVANCED PRACTICE MIDWIFE

## 2020-08-18 PROCEDURE — 99212 OFFICE O/P EST SF 10 MIN: CPT | Mod: PBBFAC,TH,PO,25 | Performed by: ADVANCED PRACTICE MIDWIFE

## 2020-08-18 PROCEDURE — 99213 PR OFFICE/OUTPT VISIT, EST, LEVL III, 20-29 MIN: ICD-10-PCS | Mod: TH,S$PBB,, | Performed by: ADVANCED PRACTICE MIDWIFE

## 2020-08-18 PROCEDURE — 99999 PR PBB SHADOW E&M-EST. PATIENT-LVL II: ICD-10-PCS | Mod: PBBFAC,,, | Performed by: ADVANCED PRACTICE MIDWIFE

## 2020-08-18 PROCEDURE — 99213 OFFICE O/P EST LOW 20 MIN: CPT | Mod: TH,S$PBB,, | Performed by: ADVANCED PRACTICE MIDWIFE

## 2020-08-18 PROCEDURE — 76819 FETAL BIOPHYS PROFIL W/O NST: CPT | Mod: PBBFAC,PO | Performed by: OBSTETRICS & GYNECOLOGY

## 2020-08-18 PROCEDURE — 76819 FETAL BIOPHYS PROFIL W/O NST: CPT | Mod: 26,S$PBB,, | Performed by: OBSTETRICS & GYNECOLOGY

## 2020-08-18 PROCEDURE — 76819 PR US, OB, FETAL BIOPHYSICAL, W/O NST: ICD-10-PCS | Mod: 26,S$PBB,, | Performed by: OBSTETRICS & GYNECOLOGY

## 2020-08-18 RX ORDER — FERROUS SULFATE 325(65) MG
325 TABLET ORAL DAILY
Qty: 30 TABLET | Refills: 3 | Status: SHIPPED | OUTPATIENT
Start: 2020-08-18 | End: 2020-11-10

## 2020-08-18 RX ORDER — AMOXICILLIN 500 MG/1
500 CAPSULE ORAL EVERY 8 HOURS
Qty: 21 CAPSULE | Refills: 0 | Status: ON HOLD | OUTPATIENT
Start: 2020-08-18 | End: 2020-09-13 | Stop reason: HOSPADM

## 2020-08-18 NOTE — PROGRESS NOTES
30 y.o. female  at 35w4d   Reports + FM, denies VB, LOF or regular CTX  Doing well without concerns, ready for baby  Tooth broke yesterday, + pain, will treat with amoxicillin  TW lbs   BPP 8/8 ELBERT 14.2 cm vtx  Reviewed warning signs, normal FKCs, labor precautions and how/when to call.  RTC x 1 wk, call or present sooner prn.

## 2020-08-23 ENCOUNTER — HOSPITAL ENCOUNTER (OUTPATIENT)
Facility: HOSPITAL | Age: 30
Discharge: HOME | End: 2020-08-23
Attending: OBSTETRICS & GYNECOLOGY | Admitting: OBSTETRICS & GYNECOLOGY
Payer: MEDICAID

## 2020-08-23 VITALS
RESPIRATION RATE: 20 BRPM | SYSTOLIC BLOOD PRESSURE: 114 MMHG | HEIGHT: 69 IN | WEIGHT: 196 LBS | DIASTOLIC BLOOD PRESSURE: 66 MMHG | TEMPERATURE: 99 F | BODY MASS INDEX: 29.03 KG/M2 | HEART RATE: 75 BPM

## 2020-08-23 DIAGNOSIS — N89.8 VAGINAL DISCHARGE DURING PREGNANCY IN THIRD TRIMESTER: Primary | ICD-10-CM

## 2020-08-23 DIAGNOSIS — O26.893 VAGINAL DISCHARGE DURING PREGNANCY IN THIRD TRIMESTER: Primary | ICD-10-CM

## 2020-08-23 LAB
AMPHET+METHAMPHET UR QL: NEGATIVE
BARBITURATES UR QL SCN>200 NG/ML: NEGATIVE
BENZODIAZ UR QL SCN>200 NG/ML: NEGATIVE
BILIRUB UR QL STRIP: NEGATIVE
BZE UR QL SCN: NEGATIVE
CANNABINOIDS UR QL SCN: NEGATIVE
CLARITY UR: CLEAR
COLOR UR: YELLOW
CREAT UR-MCNC: 109.6 MG/DL (ref 15–325)
GLUCOSE UR QL STRIP: NEGATIVE
HGB UR QL STRIP: NEGATIVE
KETONES UR QL STRIP: NEGATIVE
LEUKOCYTE ESTERASE UR QL STRIP: NEGATIVE
METHADONE UR QL SCN>300 NG/ML: NEGATIVE
NITRITE UR QL STRIP: NEGATIVE
OPIATES UR QL SCN: NEGATIVE
PCP UR QL SCN>25 NG/ML: NEGATIVE
PH UR STRIP: 8 [PH] (ref 5–8)
PROT UR QL STRIP: NEGATIVE
SP GR UR STRIP: 1.02 (ref 1–1.03)
TOXICOLOGY INFORMATION: NORMAL
URN SPEC COLLECT METH UR: NORMAL
UROBILINOGEN UR STRIP-ACNC: NEGATIVE EU/DL

## 2020-08-23 PROCEDURE — 59025 FETAL NON-STRESS TEST: CPT | Mod: 26,,, | Performed by: ADVANCED PRACTICE MIDWIFE

## 2020-08-23 PROCEDURE — 99213 PR OFFICE/OUTPT VISIT, EST, LEVL III, 20-29 MIN: ICD-10-PCS | Mod: TH,25,, | Performed by: ADVANCED PRACTICE MIDWIFE

## 2020-08-23 PROCEDURE — 81003 URINALYSIS AUTO W/O SCOPE: CPT | Mod: 59

## 2020-08-23 PROCEDURE — 59025 FETAL NON-STRESS TEST: CPT

## 2020-08-23 PROCEDURE — 99213 OFFICE O/P EST LOW 20 MIN: CPT | Mod: TH,25,, | Performed by: ADVANCED PRACTICE MIDWIFE

## 2020-08-23 PROCEDURE — 59025 OBTAIN FETAL NONSTRESS TEST (NST): ICD-10-PCS | Mod: 26,,, | Performed by: ADVANCED PRACTICE MIDWIFE

## 2020-08-23 PROCEDURE — 99211 OFF/OP EST MAY X REQ PHY/QHP: CPT | Mod: TH

## 2020-08-23 PROCEDURE — 80307 DRUG TEST PRSMV CHEM ANLYZR: CPT

## 2020-08-23 PROCEDURE — 87081 CULTURE SCREEN ONLY: CPT

## 2020-08-23 RX ORDER — ACETAMINOPHEN 500 MG
500 TABLET ORAL EVERY 6 HOURS PRN
Status: DISCONTINUED | OUTPATIENT
Start: 2020-08-23 | End: 2020-08-23 | Stop reason: HOSPADM

## 2020-08-23 RX ORDER — ONDANSETRON 8 MG/1
8 TABLET, ORALLY DISINTEGRATING ORAL EVERY 8 HOURS PRN
Status: DISCONTINUED | OUTPATIENT
Start: 2020-08-23 | End: 2020-08-23 | Stop reason: HOSPADM

## 2020-08-23 RX ORDER — SODIUM CHLORIDE 9 MG/ML
INJECTION, SOLUTION INTRAVENOUS CONTINUOUS
Status: DISCONTINUED | OUTPATIENT
Start: 2020-08-23 | End: 2020-08-23 | Stop reason: HOSPADM

## 2020-08-23 NOTE — DISCHARGE INSTRUCTIONS

## 2020-08-23 NOTE — PROCEDURES
Shruthi Riley is a 30 y.o. female patient.            Obtain Fetal nonstress test (NST)    Date/Time: 8/23/2020 3:01 PM  Performed by: Amadou Guillen CNM  Authorized by: Amadou Guillen CNM     Nonstress Test:     Variability:  6-25 BPM    Decelerations:  None    Accelerations:  15 bpm    Acoustic Stimulator: No      Baseline:  135    Uterine Irritability: No      Contractions:  Irregular    Contraction Frequency:  Rare  Biophysical Profile:     Nonstress Test Interpretation: reactive      Overall Impression:  Reassuring  Post-procedure:     Patient tolerance:  Patient tolerated the procedure well with no immediate complications        Amadou Guillen  8/23/2020

## 2020-08-23 NOTE — HOSPITAL COURSE
Observation   Nitrazine Negative  RNST  SVE unchanged from clinic  Discharge instructions reviewed including PTL precautions and fetal movement, when to return to hospital, GBS re-collected

## 2020-08-23 NOTE — DISCHARGE SUMMARY
"[facility] -   Obstetrics  Discharge Summary      Patient Name: Shruthi Riley  MRN: [id number]  Admission Date: 8/23/2020  Hospital Length of Stay: 0 days  Discharge Date and Time:  08/23/2020 3:04 PM  Attending Physician: Michelle Roque MD   Discharging Provider: Amadou Guillen CNM   Primary Care Provider: Primary Doctor No    HPI: "had sex this morning and then had a bowel movement and felt leaking of fluid and back pain"    FHT: 135 bpm with moderate variability and accelerations, no decelerations, Cat 1 (reassuring)  TOCO:  Rare    * No surgery found *     Hospital Course:   Observation   Nitrazine Negative  RNST  SVE unchanged from clinic  Discharge instructions reviewed including PTL precautions and fetal movement, when to return to hospital, GBS re-collected         Final Active Diagnoses:    Diagnosis Date Noted POA    PRINCIPAL PROBLEM:  Vaginal discharge during pregnancy in third trimester [O26.893, N89.8] 12/02/2018 Yes      Problems Resolved During this Admission:        Significant Diagnostic Studies: Labs: All labs within the past 24 hours have been reviewed    Immunizations     None          This patient has no babies on file.  Pending Diagnostic Studies:     None          Discharged Condition: good    Disposition: Home or Self Care    Follow Up:  Follow-up Information     Nirali Batista CNM. Go on 8/25/2020.    Specialty: Obstetrics  Contact information:  [address]  [place] [address] [phone]                 Patient Instructions:      Notify your health care provider if you experience any of the following:  temperature >100.4     Notify your health care provider if you experience any of the following:  persistent nausea and vomiting or diarrhea     Notify your health care provider if you experience any of the following:  severe uncontrolled pain     Notify your health care provider if you experience any of the following:  difficulty breathing or increased cough "     Notify your health care provider if you experience any of the following:  severe persistent headache     Notify your health care provider if you experience any of the following:  persistent dizziness, light-headedness, or visual disturbances     Notify your health care provider if you experience any of the following:  increased confusion or weakness     Medications:  Current Discharge Medication List      CONTINUE these medications which have NOT CHANGED    Details   amoxicillin (AMOXIL) 500 MG capsule Take 1 capsule (500 mg total) by mouth every 8 (eight) hours.  Qty: 21 capsule, Refills: 0      ferrous sulfate (FEOSOL) 325 mg (65 mg iron) Tab tablet Take 1 tablet (325 mg total) by mouth once daily.  Qty: 30 tablet, Refills: 3      prenatal vit calc,iron,folic (PRENATAL VITAMIN ORAL) Take 1 tablet by mouth once daily.      valACYclovir (VALTREX) 500 MG tablet Take 1 tablet (500 mg total) by mouth once daily.  Qty: 30 tablet, Refills: 3             Amadou Guillen CNM  Obstetrics  [facility]

## 2020-08-23 NOTE — SUBJECTIVE & OBJECTIVE
Obstetric HPI:  Patient reports None contractions, active fetal movement, No vaginal bleeding , Yes loss of fluid     This pregnancy has been complicated by   Hepatitis C  HSV2  Hx Substance Abuse  Hx fetal anomaly of heart in 1st child  Hx IUFD  Anemia    OB History    Para Term  AB Living   5 4 2 2 0 3   SAB TAB Ectopic Multiple Live Births   0 0 0 0 3      # Outcome Date GA Lbr Kris/2nd Weight Sex Delivery Anes PTL Lv   5 Current            4 Term 18 40w3d  4.082 kg (9 lb) F Vag-Spont EPI N JEN      Name: JEY PARR      Apgar1: 8  Apgar5: 9   3  01/01/15 26w0d   U    FD      Birth Comments: IUFD unk etiology   2 Term 10/10/13 40w0d  1.814 kg (4 lb) M Vag-Spont EPI  JEN      Birth Comments: pt does not have custoday-live in [place]   1  02/21/10 32w0d  3.289 kg (7 lb 4 oz) M Vag-Spont EPI  JEN      Birth Comments: pt does not have custoday-live in [place]     Past Medical History:   Diagnosis Date    Hepatitis C     History of trichomoniasis     Miscarriage      Past Surgical History:   Procedure Laterality Date    DILATION AND CURETTAGE OF UTERUS      TONSILLECTOMY         PTA Medications   Medication Sig    amoxicillin (AMOXIL) 500 MG capsule Take 1 capsule (500 mg total) by mouth every 8 (eight) hours.    ferrous sulfate (FEOSOL) 325 mg (65 mg iron) Tab tablet Take 1 tablet (325 mg total) by mouth once daily.    prenatal vit calc,iron,folic (PRENATAL VITAMIN ORAL) Take 1 tablet by mouth once daily.    valACYclovir (VALTREX) 500 MG tablet Take 1 tablet (500 mg total) by mouth once daily.       Review of patient's allergies indicates:  No Known Allergies     Family History     Problem Relation (Age of Onset)    Heart defect Mother        Tobacco Use    Smoking status: Current Every Day Smoker     Packs/day: 0.50     Types: Cigarettes    Smokeless tobacco: Never Used   Substance and Sexual Activity    Alcohol use: No    Drug use: Not Currently     Comment:  polysubstance abuser     Sexual activity: Yes     Partners: Male     Birth control/protection: None     Review of Systems   Genitourinary: Positive for vaginal discharge. Negative for vaginal bleeding.   Musculoskeletal: Positive for back pain.   All other systems reviewed and are negative.     Objective:     Vital Signs (Most Recent):    Vital Signs (24h Range):           There is no height or weight on file to calculate BMI.    FHT: 135bpm with moderate variability and accelerations, no decelerations, Cat 1 (reassuring)  TOCO:  Rare    Physical Exam:   Constitutional: She is oriented to person, place, and time. She appears well-developed and well-nourished.    HENT:   Head: Normocephalic.     Neck: Normal range of motion.    Cardiovascular: Normal rate.     Pulmonary/Chest: Effort normal.        Abdominal: Soft. There is no abdominal tenderness.   Gravid     Genitourinary:    Vagina and uterus normal.             Musculoskeletal: Normal range of motion.       Neurological: She is alert and oriented to person, place, and time.    Skin: Skin is warm and dry.    Psychiatric: She has a normal mood and affect. Her behavior is normal. Judgment and thought content normal.       Cervix:  Dilation:  2  Effacement:  75%  Station: -2  Presentation: Vertex   Nitrazine negative, no discharge or bleeding on glove  Significant Labs:  Lab Results   Component Value Date    GROUPTRH O POS 01/27/2020    HEPBSAG Negative 01/27/2020    STREPBCULT No Group B Streptococcus isolated 07/21/2020       I have personallly reviewed all pertinent lab results from the last 24 hours.

## 2020-08-23 NOTE — H&P
"[facility] -   Obstetrics  History & Physical    Patient Name: Shruthi Riley  MRN: [id number]  Admission Date: 2020  Primary Care Provider: Primary Doctor No    Subjective:     Principal Problem:Vaginal discharge during pregnancy in third trimester    History of Present Illness:  "had sex this morning and then had a bowel movement and felt leaking of fluid and back pain"    Obstetric HPI:  Patient reports None contractions, active fetal movement, No vaginal bleeding , Yes loss of fluid     This pregnancy has been complicated by   Hepatitis C  HSV2  Hx Substance Abuse  Hx fetal anomaly of heart in 1st child  Hx IUFD  Anemia    OB History    Para Term  AB Living   5 4 2 2 0 3   SAB TAB Ectopic Multiple Live Births   0 0 0 0 3      # Outcome Date GA Lbr Kris/2nd Weight Sex Delivery Anes PTL Lv   5 Current            4 Term 18 40w3d  4.082 kg (9 lb) F Vag-Spont EPI N JEN      Name: JEY RILEY      Apgar1: 8  Apgar5: 9   3  01/01/15 26w0d   U    FD      Birth Comments: IUFD unk etiology   2 Term 10/10/13 40w0d  1.814 kg (4 lb) M Vag-Spont EPI  JEN      Birth Comments: pt does not have custoday-live in [place]   1  02/21/10 32w0d  3.289 kg (7 lb 4 oz) M Vag-Spont EPI  JEN      Birth Comments: pt does not have custoday-live in [place]     Past Medical History:   Diagnosis Date    Hepatitis C     History of trichomoniasis     Miscarriage      Past Surgical History:   Procedure Laterality Date    DILATION AND CURETTAGE OF UTERUS      TONSILLECTOMY         PTA Medications   Medication Sig    amoxicillin (AMOXIL) 500 MG capsule Take 1 capsule (500 mg total) by mouth every 8 (eight) hours.    ferrous sulfate (FEOSOL) 325 mg (65 mg iron) Tab tablet Take 1 tablet (325 mg total) by mouth once daily.    prenatal vit calc,iron,folic (PRENATAL VITAMIN ORAL) Take 1 tablet by mouth once daily.    valACYclovir (VALTREX) 500 MG tablet Take 1 tablet (500 mg total) by " mouth once daily.       Review of patient's allergies indicates:  No Known Allergies     Family History     Problem Relation (Age of Onset)    Heart defect Mother        Tobacco Use    Smoking status: Current Every Day Smoker     Packs/day: 0.50     Types: Cigarettes    Smokeless tobacco: Never Used   Substance and Sexual Activity    Alcohol use: No    Drug use: Not Currently     Comment: polysubstance abuser     Sexual activity: Yes     Partners: Male     Birth control/protection: None     Review of Systems   Genitourinary: Positive for vaginal discharge. Negative for vaginal bleeding.   Musculoskeletal: Positive for back pain.   All other systems reviewed and are negative.     Objective:     Vital Signs (Most Recent):    Vital Signs (24h Range):           There is no height or weight on file to calculate BMI.    FHT: 135bpm with moderate variability and accelerations, no decelerations, Cat 1 (reassuring)  TOCO:  Rare    Physical Exam:   Constitutional: She is oriented to person, place, and time. She appears well-developed and well-nourished.    HENT:   Head: Normocephalic.     Neck: Normal range of motion.    Cardiovascular: Normal rate.     Pulmonary/Chest: Effort normal.        Abdominal: Soft. There is no abdominal tenderness.   Gravid     Genitourinary:    Vagina and uterus normal.             Musculoskeletal: Normal range of motion.       Neurological: She is alert and oriented to person, place, and time.    Skin: Skin is warm and dry.    Psychiatric: She has a normal mood and affect. Her behavior is normal. Judgment and thought content normal.       Cervix:  Dilation:  2  Effacement:  75%  Station: -2  Presentation: Vertex   Nitrazine negative, no discharge or bleeding on glove  Significant Labs:  Lab Results   Component Value Date    GROUPTRH O POS 01/27/2020    HEPBSAG Negative 01/27/2020    STREPBCULT No Group B Streptococcus isolated 07/21/2020       I have personallly reviewed all pertinent lab  results from the last 24 hours.    Assessment/Plan:     30 y.o. female  at 36w2d for:    * Vaginal discharge during pregnancy in third trimester  Observation   NST  SVE  Nitrazine negative  GBS re-collected        Amadou Guillen CNM  Obstetrics  [facility]

## 2020-08-24 ENCOUNTER — PATIENT MESSAGE (OUTPATIENT)
Dept: OBSTETRICS AND GYNECOLOGY | Facility: CLINIC | Age: 30
End: 2020-08-24

## 2020-08-24 ENCOUNTER — ROUTINE PRENATAL (OUTPATIENT)
Dept: OBSTETRICS AND GYNECOLOGY | Facility: CLINIC | Age: 30
End: 2020-08-24
Payer: MEDICAID

## 2020-08-24 DIAGNOSIS — Z87.59 HISTORY OF IUFD: ICD-10-CM

## 2020-08-24 DIAGNOSIS — O99.012 ANEMIA DURING PREGNANCY IN SECOND TRIMESTER: ICD-10-CM

## 2020-08-24 DIAGNOSIS — Z34.80 SUPERVISION OF OTHER NORMAL PREGNANCY: Primary | ICD-10-CM

## 2020-08-24 PROCEDURE — 99213 OFFICE O/P EST LOW 20 MIN: CPT | Mod: TH,S$PBB,, | Performed by: ADVANCED PRACTICE MIDWIFE

## 2020-08-24 PROCEDURE — 99999 PR PBB SHADOW E&M-EST. PATIENT-LVL II: ICD-10-PCS | Mod: PBBFAC,,, | Performed by: ADVANCED PRACTICE MIDWIFE

## 2020-08-24 PROCEDURE — 99213 PR OFFICE/OUTPT VISIT, EST, LEVL III, 20-29 MIN: ICD-10-PCS | Mod: TH,S$PBB,, | Performed by: ADVANCED PRACTICE MIDWIFE

## 2020-08-24 PROCEDURE — 99999 PR PBB SHADOW E&M-EST. PATIENT-LVL II: CPT | Mod: PBBFAC,,, | Performed by: ADVANCED PRACTICE MIDWIFE

## 2020-08-24 PROCEDURE — 99212 OFFICE O/P EST SF 10 MIN: CPT | Mod: PBBFAC,TH,PO | Performed by: ADVANCED PRACTICE MIDWIFE

## 2020-08-25 ENCOUNTER — TELEPHONE (OUTPATIENT)
Dept: OBSTETRICS AND GYNECOLOGY | Facility: HOSPITAL | Age: 30
End: 2020-08-25

## 2020-08-25 VITALS
BODY MASS INDEX: 29.28 KG/M2 | WEIGHT: 198.31 LBS | DIASTOLIC BLOOD PRESSURE: 72 MMHG | SYSTOLIC BLOOD PRESSURE: 124 MMHG

## 2020-08-25 NOTE — TELEPHONE ENCOUNTER
Triage Discharged Patient Questionnaire:  1. I see that you were seen for contractions. How are you feeling? Im feeling OK. I'm thinking this storm is going to put me into labor.   2. When in our triage area, did the staff respond in a timely manner to the call light? Yes   3. Did you receive discharge instructions yesterday? Yes  Do you have any questions regarding the discharge instructions? No   4. Do you feel your concerns regarding your care were addressed? Absolutely.   5. Did you receive any medications with your visit? No   6. Do you have a follow-up appointment with your provider? Yes   If so, when? Yesterday     REWARD AND RECOGNITION   Are there any physicians, nurses, or hospital staff you would like us to recognize for doing a very good job? Everyone is always great.     PROCESS IMPROVEMENT    Do you have any questions for me? no  Do you have any suggestions for us to improve the care we provide to our patients and the community we serve? No   You will receive a survey in the mail, please take the time to fill it out and send it in.    Thank you

## 2020-08-25 NOTE — PROGRESS NOTES
30 y.o. female  at 36w3d  Reports + FM, denies VB, LOF or regular CTX  Doing well without concerns  TW lbs   GBS collected earlier this preg and repeated  in LND, pt had increased vaginal d/c after intercourse  Reviewed warning signs, normal FKCs, labor precautions and how/when to call.  RTC x 1 wk, call or present sooner prn.

## 2020-08-26 LAB — BACTERIA SPEC AEROBE CULT: NORMAL

## 2020-09-03 ENCOUNTER — ROUTINE PRENATAL (OUTPATIENT)
Dept: OBSTETRICS AND GYNECOLOGY | Facility: CLINIC | Age: 30
End: 2020-09-03
Payer: MEDICAID

## 2020-09-03 VITALS
WEIGHT: 200.06 LBS | SYSTOLIC BLOOD PRESSURE: 108 MMHG | DIASTOLIC BLOOD PRESSURE: 70 MMHG | BODY MASS INDEX: 29.54 KG/M2

## 2020-09-03 DIAGNOSIS — O99.012 ANEMIA DURING PREGNANCY IN SECOND TRIMESTER: ICD-10-CM

## 2020-09-03 DIAGNOSIS — Z34.80 SUPERVISION OF OTHER NORMAL PREGNANCY: Primary | ICD-10-CM

## 2020-09-03 DIAGNOSIS — Z87.59 HISTORY OF IUFD: ICD-10-CM

## 2020-09-03 PROCEDURE — 99213 OFFICE O/P EST LOW 20 MIN: CPT | Mod: TH,S$PBB,, | Performed by: ADVANCED PRACTICE MIDWIFE

## 2020-09-03 PROCEDURE — 99999 PR PBB SHADOW E&M-EST. PATIENT-LVL III: ICD-10-PCS | Mod: PBBFAC,,, | Performed by: ADVANCED PRACTICE MIDWIFE

## 2020-09-03 PROCEDURE — 99213 PR OFFICE/OUTPT VISIT, EST, LEVL III, 20-29 MIN: ICD-10-PCS | Mod: TH,S$PBB,, | Performed by: ADVANCED PRACTICE MIDWIFE

## 2020-09-03 PROCEDURE — 99213 OFFICE O/P EST LOW 20 MIN: CPT | Mod: PBBFAC,TH,PO | Performed by: ADVANCED PRACTICE MIDWIFE

## 2020-09-03 PROCEDURE — 99999 PR PBB SHADOW E&M-EST. PATIENT-LVL III: CPT | Mod: PBBFAC,,, | Performed by: ADVANCED PRACTICE MIDWIFE

## 2020-09-03 NOTE — PROGRESS NOTES
30 y.o. female  at 37w6d   Reports + FM, denies VB, LOF or regular CTX  Doing well without concerns  Ready for baby  Reviewed warning signs, normal FKCs, labor precautions and how/when to call.  RTC x 1 wk, call or present sooner prn.

## 2020-09-08 ENCOUNTER — ROUTINE PRENATAL (OUTPATIENT)
Dept: OBSTETRICS AND GYNECOLOGY | Facility: CLINIC | Age: 30
End: 2020-09-08
Payer: MEDICAID

## 2020-09-08 ENCOUNTER — PATIENT MESSAGE (OUTPATIENT)
Dept: OBSTETRICS AND GYNECOLOGY | Facility: CLINIC | Age: 30
End: 2020-09-08

## 2020-09-08 ENCOUNTER — PROCEDURE VISIT (OUTPATIENT)
Dept: OBSTETRICS AND GYNECOLOGY | Facility: CLINIC | Age: 30
End: 2020-09-08
Payer: MEDICAID

## 2020-09-08 VITALS — DIASTOLIC BLOOD PRESSURE: 70 MMHG | WEIGHT: 204.5 LBS | SYSTOLIC BLOOD PRESSURE: 112 MMHG | BODY MASS INDEX: 30.2 KG/M2

## 2020-09-08 DIAGNOSIS — Z87.59 HISTORY OF IUFD: ICD-10-CM

## 2020-09-08 DIAGNOSIS — O36.8130 DECREASED FETAL MOVEMENTS IN THIRD TRIMESTER, SINGLE OR UNSPECIFIED FETUS: ICD-10-CM

## 2020-09-08 DIAGNOSIS — O99.012 ANEMIA DURING PREGNANCY IN SECOND TRIMESTER: ICD-10-CM

## 2020-09-08 DIAGNOSIS — Z34.80 SUPERVISION OF OTHER NORMAL PREGNANCY: Primary | ICD-10-CM

## 2020-09-08 PROCEDURE — 76816 OB US FOLLOW-UP PER FETUS: CPT | Mod: 26,S$PBB,, | Performed by: OBSTETRICS & GYNECOLOGY

## 2020-09-08 PROCEDURE — 99213 OFFICE O/P EST LOW 20 MIN: CPT | Mod: TH,S$PBB,, | Performed by: ADVANCED PRACTICE MIDWIFE

## 2020-09-08 PROCEDURE — 99213 PR OFFICE/OUTPT VISIT, EST, LEVL III, 20-29 MIN: ICD-10-PCS | Mod: TH,S$PBB,, | Performed by: ADVANCED PRACTICE MIDWIFE

## 2020-09-08 PROCEDURE — 76816 PR  US,PREGNANT UTERUS,F/U,TRANSABD APP: ICD-10-PCS | Mod: 26,S$PBB,, | Performed by: OBSTETRICS & GYNECOLOGY

## 2020-09-08 PROCEDURE — 99213 OFFICE O/P EST LOW 20 MIN: CPT | Mod: PBBFAC,TH,PO | Performed by: ADVANCED PRACTICE MIDWIFE

## 2020-09-08 PROCEDURE — 99999 PR PBB SHADOW E&M-EST. PATIENT-LVL III: ICD-10-PCS | Mod: PBBFAC,,, | Performed by: ADVANCED PRACTICE MIDWIFE

## 2020-09-08 PROCEDURE — 76816 OB US FOLLOW-UP PER FETUS: CPT | Mod: PBBFAC,PO | Performed by: OBSTETRICS & GYNECOLOGY

## 2020-09-08 PROCEDURE — 99999 PR PBB SHADOW E&M-EST. PATIENT-LVL III: CPT | Mod: PBBFAC,,, | Performed by: ADVANCED PRACTICE MIDWIFE

## 2020-09-08 NOTE — PROGRESS NOTES
30 y.o. female  at 38w4d   Reports + FM, denies VB, LOF or regular CTX  Doing well, tired, has had some UC recently, feels FM have decreased  US today BPP , ELBERT 14.7cm, vtx, head low  cx super favorable, will monitor movement and UC, hopefully labor will occur tonight or tomorrow  TW lbs   Reviewed warning signs, normal FKCs, labor precautions and how/when to call.  RTC x 1 wk, call or present sooner prn.

## 2020-09-11 ENCOUNTER — ANESTHESIA (OUTPATIENT)
Dept: OBSTETRICS AND GYNECOLOGY | Facility: HOSPITAL | Age: 30
End: 2020-09-11
Payer: MEDICAID

## 2020-09-11 ENCOUNTER — ANESTHESIA EVENT (OUTPATIENT)
Dept: OBSTETRICS AND GYNECOLOGY | Facility: HOSPITAL | Age: 30
End: 2020-09-11
Payer: MEDICAID

## 2020-09-11 ENCOUNTER — HOSPITAL ENCOUNTER (INPATIENT)
Facility: HOSPITAL | Age: 30
LOS: 2 days | Discharge: HOME | End: 2020-09-13
Attending: OBSTETRICS & GYNECOLOGY | Admitting: OBSTETRICS & GYNECOLOGY
Payer: MEDICAID

## 2020-09-11 DIAGNOSIS — Z34.90 ENCOUNTER FOR PLANNED INDUCTION OF LABOR: ICD-10-CM

## 2020-09-11 PROBLEM — N89.8 VAGINAL DISCHARGE DURING PREGNANCY IN THIRD TRIMESTER: Status: RESOLVED | Noted: 2018-12-02 | Resolved: 2020-09-11

## 2020-09-11 PROBLEM — O26.893 VAGINAL DISCHARGE DURING PREGNANCY IN THIRD TRIMESTER: Status: RESOLVED | Noted: 2018-12-02 | Resolved: 2020-09-11

## 2020-09-11 LAB
ABO + RH BLD: NORMAL
BASOPHILS # BLD AUTO: 0.04 K/UL (ref 0–0.2)
BASOPHILS NFR BLD: 0.3 % (ref 0–1.9)
BLD GP AB SCN CELLS X3 SERPL QL: NORMAL
DIFFERENTIAL METHOD: ABNORMAL
EOSINOPHIL # BLD AUTO: 0.2 K/UL (ref 0–0.5)
EOSINOPHIL NFR BLD: 1.1 % (ref 0–8)
ERYTHROCYTE [DISTWIDTH] IN BLOOD BY AUTOMATED COUNT: 13.4 % (ref 11.5–14.5)
HCT VFR BLD AUTO: 34.2 % (ref 37–48.5)
HGB BLD-MCNC: 11.5 G/DL (ref 12–16)
IMM GRANULOCYTES # BLD AUTO: 0.24 K/UL (ref 0–0.04)
IMM GRANULOCYTES NFR BLD AUTO: 1.6 % (ref 0–0.5)
LYMPHOCYTES # BLD AUTO: 2.8 K/UL (ref 1–4.8)
LYMPHOCYTES NFR BLD: 18.6 % (ref 18–48)
MCH RBC QN AUTO: 32.1 PG (ref 27–31)
MCHC RBC AUTO-ENTMCNC: 33.6 G/DL (ref 32–36)
MCV RBC AUTO: 96 FL (ref 82–98)
MONOCYTES # BLD AUTO: 1.3 K/UL (ref 0.3–1)
MONOCYTES NFR BLD: 8.5 % (ref 4–15)
NEUTROPHILS # BLD AUTO: 10.4 K/UL (ref 1.8–7.7)
NEUTROPHILS NFR BLD: 69.9 % (ref 38–73)
NRBC BLD-RTO: 0 /100 WBC
PLATELET # BLD AUTO: 239 K/UL (ref 150–350)
PMV BLD AUTO: 10.6 FL (ref 9.2–12.9)
RBC # BLD AUTO: 3.58 M/UL (ref 4–5.4)
SARS-COV-2 RDRP RESP QL NAA+PROBE: NEGATIVE
WBC # BLD AUTO: 14.83 K/UL (ref 3.9–12.7)

## 2020-09-11 PROCEDURE — 62326 NJX INTERLAMINAR LMBR/SAC: CPT | Performed by: NURSE ANESTHETIST, CERTIFIED REGISTERED

## 2020-09-11 PROCEDURE — 25000003 PHARM REV CODE 250: Performed by: ADVANCED PRACTICE MIDWIFE

## 2020-09-11 PROCEDURE — 63600175 PHARM REV CODE 636 W HCPCS: Performed by: NURSE ANESTHETIST, CERTIFIED REGISTERED

## 2020-09-11 PROCEDURE — 86850 RBC ANTIBODY SCREEN: CPT

## 2020-09-11 PROCEDURE — 27800516 HC TRAY, EPIDURAL COMBO: Performed by: ANESTHESIOLOGY

## 2020-09-11 PROCEDURE — 25000003 PHARM REV CODE 250: Performed by: NURSE ANESTHETIST, CERTIFIED REGISTERED

## 2020-09-11 PROCEDURE — 72100002 HC LABOR CARE, 1ST 8 HOURS

## 2020-09-11 PROCEDURE — 72100003 HC LABOR CARE, EA. ADDL. 8 HRS

## 2020-09-11 PROCEDURE — 63600175 PHARM REV CODE 636 W HCPCS: Performed by: ADVANCED PRACTICE MIDWIFE

## 2020-09-11 PROCEDURE — 51701 INSERT BLADDER CATHETER: CPT

## 2020-09-11 PROCEDURE — 59409 OBSTETRICAL CARE: CPT | Mod: GB,,, | Performed by: ADVANCED PRACTICE MIDWIFE

## 2020-09-11 PROCEDURE — 27200710 HC EPIDURAL INFUSION PUMP SET: Performed by: ANESTHESIOLOGY

## 2020-09-11 PROCEDURE — 85025 COMPLETE CBC W/AUTO DIFF WBC: CPT

## 2020-09-11 PROCEDURE — 72200005 HC VAGINAL DELIVERY LEVEL II

## 2020-09-11 PROCEDURE — 59409 PR OBSTETRICAL CARE,VAG DELIV ONLY: ICD-10-PCS | Mod: GB,,, | Performed by: ADVANCED PRACTICE MIDWIFE

## 2020-09-11 PROCEDURE — 11000001 HC ACUTE MED/SURG PRIVATE ROOM

## 2020-09-11 PROCEDURE — U0002 COVID-19 LAB TEST NON-CDC: HCPCS

## 2020-09-11 RX ORDER — HYDROCORTISONE 25 MG/G
CREAM TOPICAL 3 TIMES DAILY PRN
Status: DISCONTINUED | OUTPATIENT
Start: 2020-09-11 | End: 2020-09-13 | Stop reason: HOSPADM

## 2020-09-11 RX ORDER — DIPHENHYDRAMINE HCL 25 MG
25 CAPSULE ORAL EVERY 4 HOURS PRN
Status: DISCONTINUED | OUTPATIENT
Start: 2020-09-11 | End: 2020-09-13 | Stop reason: HOSPADM

## 2020-09-11 RX ORDER — SODIUM CHLORIDE, SODIUM LACTATE, POTASSIUM CHLORIDE, CALCIUM CHLORIDE 600; 310; 30; 20 MG/100ML; MG/100ML; MG/100ML; MG/100ML
INJECTION, SOLUTION INTRAVENOUS CONTINUOUS
Status: DISCONTINUED | OUTPATIENT
Start: 2020-09-11 | End: 2020-09-13 | Stop reason: HOSPADM

## 2020-09-11 RX ORDER — OXYTOCIN/RINGER'S LACTATE 30/500 ML
2 PLASTIC BAG, INJECTION (ML) INTRAVENOUS CONTINUOUS
Status: DISCONTINUED | OUTPATIENT
Start: 2020-09-11 | End: 2020-09-13 | Stop reason: HOSPADM

## 2020-09-11 RX ORDER — OXYTOCIN/RINGER'S LACTATE 30/500 ML
95 PLASTIC BAG, INJECTION (ML) INTRAVENOUS ONCE
Status: COMPLETED | OUTPATIENT
Start: 2020-09-11 | End: 2020-09-11

## 2020-09-11 RX ORDER — HYDROCODONE BITARTRATE AND ACETAMINOPHEN 5; 325 MG/1; MG/1
1 TABLET ORAL EVERY 4 HOURS PRN
Status: DISCONTINUED | OUTPATIENT
Start: 2020-09-11 | End: 2020-09-13 | Stop reason: HOSPADM

## 2020-09-11 RX ORDER — IBUPROFEN 600 MG/1
600 TABLET ORAL EVERY 6 HOURS
Status: DISCONTINUED | OUTPATIENT
Start: 2020-09-11 | End: 2020-09-12

## 2020-09-11 RX ORDER — SIMETHICONE 80 MG
1 TABLET,CHEWABLE ORAL 4 TIMES DAILY PRN
Status: DISCONTINUED | OUTPATIENT
Start: 2020-09-11 | End: 2020-09-13 | Stop reason: HOSPADM

## 2020-09-11 RX ORDER — PRENATAL WITH FERROUS FUM AND FOLIC ACID 3080; 920; 120; 400; 22; 1.84; 3; 20; 10; 1; 12; 200; 27; 25; 2 [IU]/1; [IU]/1; MG/1; [IU]/1; MG/1; MG/1; MG/1; MG/1; MG/1; MG/1; UG/1; MG/1; MG/1; MG/1; MG/1
1 TABLET ORAL DAILY
Status: DISCONTINUED | OUTPATIENT
Start: 2020-09-11 | End: 2020-09-13 | Stop reason: HOSPADM

## 2020-09-11 RX ORDER — CALCIUM CARBONATE 200(500)MG
500 TABLET,CHEWABLE ORAL 3 TIMES DAILY PRN
Status: DISCONTINUED | OUTPATIENT
Start: 2020-09-11 | End: 2020-09-13 | Stop reason: HOSPADM

## 2020-09-11 RX ORDER — ROPIVACAINE HYDROCHLORIDE 2 MG/ML
INJECTION, SOLUTION EPIDURAL; INFILTRATION; PERINEURAL
Status: DISCONTINUED | OUTPATIENT
Start: 2020-09-11 | End: 2020-09-11

## 2020-09-11 RX ORDER — OXYTOCIN/RINGER'S LACTATE 30/500 ML
334 PLASTIC BAG, INJECTION (ML) INTRAVENOUS ONCE
Status: COMPLETED | OUTPATIENT
Start: 2020-09-11 | End: 2020-09-11

## 2020-09-11 RX ORDER — HYDROCODONE BITARTRATE AND ACETAMINOPHEN 7.5; 325 MG/1; MG/1
1 TABLET ORAL EVERY 4 HOURS PRN
Status: DISCONTINUED | OUTPATIENT
Start: 2020-09-11 | End: 2020-09-13 | Stop reason: HOSPADM

## 2020-09-11 RX ORDER — ACETAMINOPHEN 325 MG/1
650 TABLET ORAL EVERY 6 HOURS PRN
Status: DISCONTINUED | OUTPATIENT
Start: 2020-09-11 | End: 2020-09-13 | Stop reason: HOSPADM

## 2020-09-11 RX ORDER — ONDANSETRON 8 MG/1
8 TABLET, ORALLY DISINTEGRATING ORAL EVERY 8 HOURS PRN
Status: CANCELLED | OUTPATIENT
Start: 2020-09-11

## 2020-09-11 RX ORDER — LIDOCAINE HYDROCHLORIDE AND EPINEPHRINE 15; 5 MG/ML; UG/ML
INJECTION, SOLUTION EPIDURAL
Status: DISCONTINUED | OUTPATIENT
Start: 2020-09-11 | End: 2020-09-11

## 2020-09-11 RX ORDER — AMMONIA 15 % (W/V)
0.3 AMPUL (EA) INHALATION CONTINUOUS PRN
Status: DISCONTINUED | OUTPATIENT
Start: 2020-09-11 | End: 2020-09-13 | Stop reason: HOSPADM

## 2020-09-11 RX ORDER — ONDANSETRON 8 MG/1
8 TABLET, ORALLY DISINTEGRATING ORAL EVERY 8 HOURS PRN
Status: DISCONTINUED | OUTPATIENT
Start: 2020-09-11 | End: 2020-09-13 | Stop reason: HOSPADM

## 2020-09-11 RX ORDER — OXYTOCIN/RINGER'S LACTATE 30/500 ML
95 PLASTIC BAG, INJECTION (ML) INTRAVENOUS ONCE
Status: DISCONTINUED | OUTPATIENT
Start: 2020-09-11 | End: 2020-09-13 | Stop reason: HOSPADM

## 2020-09-11 RX ORDER — ROPIVACAINE HYDROCHLORIDE 2 MG/ML
INJECTION, SOLUTION EPIDURAL; INFILTRATION; PERINEURAL CONTINUOUS PRN
Status: DISCONTINUED | OUTPATIENT
Start: 2020-09-11 | End: 2020-09-11

## 2020-09-11 RX ORDER — DOCUSATE SODIUM 100 MG/1
100 CAPSULE, LIQUID FILLED ORAL DAILY
Status: DISCONTINUED | OUTPATIENT
Start: 2020-09-11 | End: 2020-09-13 | Stop reason: HOSPADM

## 2020-09-11 RX ORDER — SODIUM CHLORIDE 9 MG/ML
INJECTION, SOLUTION INTRAVENOUS
Status: DISCONTINUED | OUTPATIENT
Start: 2020-09-11 | End: 2020-09-13 | Stop reason: HOSPADM

## 2020-09-11 RX ADMIN — Medication 334 MILLI-UNITS/MIN: at 01:09

## 2020-09-11 RX ADMIN — DOCUSATE SODIUM 100 MG: 100 CAPSULE, LIQUID FILLED ORAL at 06:09

## 2020-09-11 RX ADMIN — ROPIVACAINE HYDROCHLORIDE 14 ML/HR: 2 INJECTION, SOLUTION EPIDURAL; INFILTRATION at 10:09

## 2020-09-11 RX ADMIN — IBUPROFEN 600 MG: 600 TABLET, FILM COATED ORAL at 11:09

## 2020-09-11 RX ADMIN — SODIUM CHLORIDE, SODIUM LACTATE, POTASSIUM CHLORIDE, AND CALCIUM CHLORIDE: .6; .31; .03; .02 INJECTION, SOLUTION INTRAVENOUS at 07:09

## 2020-09-11 RX ADMIN — HYDROCODONE BITARTRATE AND ACETAMINOPHEN 1 TABLET: 5; 325 TABLET ORAL at 04:09

## 2020-09-11 RX ADMIN — LIDOCAINE HYDROCHLORIDE,EPINEPHRINE BITARTRATE 3 ML: 15; .005 INJECTION, SOLUTION EPIDURAL; INFILTRATION; INTRACAUDAL; PERINEURAL at 10:09

## 2020-09-11 RX ADMIN — PRENATAL VITAMINS-IRON FUMARATE 27 MG IRON-FOLIC ACID 0.8 MG TABLET 1 TABLET: at 06:09

## 2020-09-11 RX ADMIN — Medication 2 MILLI-UNITS/MIN: at 08:09

## 2020-09-11 RX ADMIN — HYDROCODONE BITARTRATE AND ACETAMINOPHEN 1 TABLET: 5; 325 TABLET ORAL at 08:09

## 2020-09-11 RX ADMIN — ROPIVACAINE HYDROCHLORIDE 5 ML: 2 INJECTION, SOLUTION EPIDURAL; INFILTRATION at 10:09

## 2020-09-11 RX ADMIN — IBUPROFEN 600 MG: 600 TABLET, FILM COATED ORAL at 06:09

## 2020-09-11 RX ADMIN — CALCIUM CARBONATE (ANTACID) CHEW TAB 500 MG 500 MG: 500 CHEW TAB at 12:09

## 2020-09-11 RX ADMIN — Medication 95 MILLI-UNITS/MIN: at 02:09

## 2020-09-11 NOTE — SUBJECTIVE & OBJECTIVE
Obstetric HPI:  This pregnancy has been complicated by:  -Anemia  -History of IUFD at 26 weeks  -Chronic Hepatitis C  -History of substance abuse    OB History    Para Term  AB Living   5 4 2 2 0 3   SAB TAB Ectopic Multiple Live Births   0 0 0 0 3      # Outcome Date GA Lbr Kris/2nd Weight Sex Delivery Anes PTL Lv   5 Current            4 Term 18 40w3d  4.082 kg (9 lb) F Vag-Spont EPI N JEN      Name: JEY PARR      Apgar1: 8  Apgar5: 9   3  01/01/15 26w0d   U    FD      Birth Comments: IUFD unk etiology   2 Term 10/10/13 40w0d  1.814 kg (4 lb) M Vag-Spont EPI  JEN      Birth Comments: pt does not have custoday-live in [place]   1  02/21/10 32w0d  3.289 kg (7 lb 4 oz) M Vag-Spont EPI  JEN      Birth Comments: pt does not have custoday-live in [place]     Past Medical History:   Diagnosis Date    Hepatitis C     History of trichomoniasis     Miscarriage      Past Surgical History:   Procedure Laterality Date    DILATION AND CURETTAGE OF UTERUS      TONSILLECTOMY         PTA Medications   Medication Sig    ferrous sulfate (FEOSOL) 325 mg (65 mg iron) Tab tablet Take 1 tablet (325 mg total) by mouth once daily.    prenatal vit calc,iron,folic (PRENATAL VITAMIN ORAL) Take 1 tablet by mouth once daily.    valACYclovir (VALTREX) 500 MG tablet Take 1 tablet (500 mg total) by mouth once daily.    amoxicillin (AMOXIL) 500 MG capsule Take 1 capsule (500 mg total) by mouth every 8 (eight) hours. (Patient not taking: Reported on 9/3/2020)       Review of patient's allergies indicates:  No Known Allergies     Family History     Problem Relation (Age of Onset)    Heart defect Mother        Tobacco Use    Smoking status: Current Every Day Smoker     Packs/day: 0.50     Types: Cigarettes    Smokeless tobacco: Never Used   Substance and Sexual Activity    Alcohol use: No    Drug use: Not Currently     Comment: polysubstance abuser     Sexual activity: Yes     Partners: Male      Birth control/protection: None     Review of Systems   Gastrointestinal: Negative for abdominal pain, nausea and vomiting.   Genitourinary: Negative for vaginal bleeding and vaginal discharge.   All other systems reviewed and are negative.     Objective:     Vital Signs (Most Recent):  Pulse: 77 (09/11/20 0715)  BP: 112/69 (09/11/20 0715)  SpO2: 99 % (09/11/20 0715) Vital Signs (24h Range):  Pulse:  [76-93] 77  SpO2:  [98 %-100 %] 99 %  BP: (111-120)/(64-69) 112/69     Weight: 90.7 kg (200 lb)  Body mass index is 29.53 kg/m².    FHT: Cat 1 (reassuring) baseline 135, moderate variability, 15x15 accels, no decelerations  TOCO:  Q 3-5 minutes    Physical Exam:   Constitutional: She is oriented to person, place, and time. She appears well-developed and well-nourished.      Neck: Normal range of motion.    Cardiovascular: Regular rhythm.     Pulmonary/Chest: Effort normal.        Abdominal: Soft.   Gravid, EFW 7.5 lbs     Genitourinary:    Vagina normal.             Musculoskeletal: Normal range of motion.       Neurological: She is alert and oriented to person, place, and time.    Skin: Skin is warm and dry.    Psychiatric: She has a normal mood and affect. Her behavior is normal.       Cervix:  Dilation:  5  Effacement:  75%  Station: -2  Presentation: Vertex     Significant Labs:  Lab Results   Component Value Date    GROUPTRH O POS 09/11/2020    HEPBSAG Negative 01/27/2020    STREPBCULT No Group B Streptococcus isolated 08/23/2020       I have personallly reviewed all pertinent lab results from the last 24 hours.

## 2020-09-11 NOTE — ANESTHESIA PREPROCEDURE EVALUATION
09/11/2020  Shruthi Riley is a 30 y.o., female.    Anesthesia Evaluation    I have reviewed the Patient Summary Reports.    I have reviewed the Nursing Notes.    I have reviewed the Medications.     Review of Systems  Anesthesia Hx:  Neg history of prior surgery. Denies Family Hx of Anesthesia complications.   Denies Personal Hx of Anesthesia complications.   Social:  Smoker    Hepatic/GI:   Hepatitis    OB/GYN/PEDS:  Planned Vaginal Delivery        Physical Exam  General:  Well nourished    Airway/Jaw/Neck:  Airway Findings: Mouth Opening: Normal Tongue: Normal  General Airway Assessment: Adult  Mallampati: II  Jaw/Neck Findings:  Neck ROM: Normal ROM     Eyes/Ears/Nose:  EYES/EARS/NOSE FINDINGS: Normal   Dental:  Dental Findings: In tact   Chest/Lungs:  Chest/Lungs Clear    Heart/Vascular:  Heart Findings: Normal Heart murmur: negative       Mental Status:  Mental Status Findings:  Cooperative         Anesthesia Plan  Type of Anesthesia, risks & benefits discussed:  Anesthesia Type:  epidural  Patient's Preference:   Intra-op Monitoring Plan: standard ASA monitors  Intra-op Monitoring Plan Comments:   Post Op Pain Control Plan: epidural analgesia  Post Op Pain Control Plan Comments:   Induction:    Beta Blocker:         Informed Consent: Patient understands risks and agrees with Anesthesia plan.  Questions answered. Anesthesia consent signed with patient.  ASA Score: 2     Day of Surgery Review of History & Physical:    H&P update referred to the surgeon.         Ready For Surgery From Anesthesia Perspective.

## 2020-09-11 NOTE — ANESTHESIA PROCEDURE NOTES
Epidural    Patient location during procedure: OB   Reason for block: primary anesthetic   Diagnosis: IUP   Start time: 9/11/2020 10:04 AM  Timeout: 9/11/2020 10:04 AM  End time: 9/11/2020 10:16 AM    Staffing  Performing Provider: Jacob Ryan CRNA  Authorizing Provider: Alvaro Purdy II, MD        Preanesthetic Checklist  Completed: patient identified, site marked, surgical consent, pre-op evaluation, timeout performed, IV checked, risks and benefits discussed, monitors and equipment checked, anesthesia consent given, hand hygiene performed and patient being monitored  Preparation  Patient position: sitting  Prep: Betadine  Patient monitoring: Pulse Ox and Blood Pressure  Epidural  Skin Anesthetic: lidocaine 1%  Skin Wheal: 3 mL  Administration type: continuous  Approach: midline  Interspace: L3-4    Injection technique: GASTON air  Needle and Epidural Catheter  Needle type: Tuohy   Needle gauge: 17  Needle length: 3.5 inches  Needle insertion depth: 7 cm  Catheter type: springwound and multi-orifice  Catheter size: 18 G  Catheter at skin depth: 14 cm  Test dose: 3 mL of lidocaine 1.5% with Epi 1-to-200,000  Additional Documentation: incremental injection, negative aspiration for heme and CSF, no paresthesia on injection, no signs/symptoms of IV or SA injection, no significant pain on injection and no significant complaints from patient  Needle localization: anatomical landmarks  Medications:  Volume per aspiration: 0 mL  Time between aspirations: 2 minutes  Assessment  Ease of block: easy  Patient's tolerance of the procedure: comfortable throughout block and no complaintsNo inadvertent dural puncture with Tuohy.  Dural puncture performed with spinal needle.

## 2020-09-11 NOTE — L&D DELIVERY NOTE
[facility]  Vaginal Delivery   Operative Note    SUMMARY     Normal spontaneous vaginal delivery of live infant, was placed on mothers abdomen for skin to skin and bulb suctioning performed.  Infant delivered position OA over intact perineum.  Nuchal cord: No.    Spontaneous delivery of placenta and IV pitocin given noting good uterine tone.  No lacerations noted.  Patient tolerated delivery well. Sponge needle and lap counted correctly x2.    Indications: Normal vaginal delivery  Pregnancy complicated by:   Patient Active Problem List   Diagnosis    Chronic hepatitis C without hepatic coma    History of congenital anomaly of heart    History of substance abuse    HSV-2 (herpes simplex virus 2) infection    History of IUFD    Skin hypopigmentation    Anemia during pregnancy in second trimester    Supervision of other normal pregnancy    Encounter for planned induction of labor    Single live birth    Normal vaginal delivery     Admitting GA: 39w0d    Delivery Information for Kade Riley    Birth information:  YOB: 2019   Time of birth: 1:46 PM   Sex: male   Head Delivery Date/Time:     Delivery type: Vaginal, Spontaneous   Gestational Age: 39w0d    Delivery Providers    Delivering clinician: Nirali Batista CNM   Provider Role    Etelvina Munguia Nursing Student            Measurements    Weight:   Length:          Apgars    Living status:   Apgars:  1 min.:  5 min.:  10 min.:  15 min.:  20 min.:    Skin color:         Heart rate:         Reflex irritability:         Muscle tone:         Respiratory effort:         Total:                Operative Delivery    Forceps attempted?: No  Vacuum extractor attempted?: No         Shoulder Dystocia    Shoulder dystocia present?: No           Presentation    Presentation: Vertex  Position: Middle Occiput Anterior           Interventions/Resuscitation    Method: Bulb Suctioning       Cord    Vessels: 3 vessels  Complications:  None  Delayed Cord Clamping?: Yes  Cord Blood Disposition: Lab  Gases Sent?: No  Stem Cell Collection (by MD): No       Placenta    Placenta delivery date/time:   Placenta removal:            Labor Events:       labor:       Labor Onset Date/Time:         Dilation Complete Date/Time:         Start Pushing Date/Time:         Start Pushing Date/Time:       Rupture Date/Time: 20  1038         Rupture type:          Fluid Amount: Small      Fluid Color: Clear      Fluid Odor:       Membrane Status: ARM (Artificial Rupture)               steroids:       Antibiotics given for GBS:       Induction:       Indications for induction:        Augmentation:       Indications for augmentation:       Labor complications: None     Additional complications:          Cervical ripening:                     Delivery:      Episiotomy: None     Indication for Episiotomy:       Perineal Lacerations: None Repaired:      Periurethral Laceration:   Repaired:     Labial Laceration:   Repaired:     Sulcus Laceration:   Repaired:     Vaginal Laceration:   Repaired:     Cervical Laceration:   Repaired:     Repair suture:       Repair # of packets:       Last Value - EBL - Nursing (mL): 100     Sum - EBL - Nursing (mL): 100     Last Value - EBL - Anesthesia (mL):      Calculated QBL (mL):       Vaginal Sweep Performed:       Surgicount Correct:         Other providers:       Anesthesia    Method: Epidural          Details (if applicable):  Trial of Labor      Categorization:      Priority:     Indications for :     Incision Type:       Additional  information:  Forceps:    Vacuum:    Breech:    Observed anomalies    Other (Comments):         LILI Beckman

## 2020-09-11 NOTE — HPI
31yo  EGA 39w0d PORTER 20 presents for scheduled induction. Reports good fetal movement. Denies pain, regular ctx, leaking fluid or vaginal bleeding.

## 2020-09-11 NOTE — LACTATION NOTE
Lactation Rounds:     Called to bedside. First feeding in progress. Infant had detached from breast, and mother stated that she didn't know if infant was still hungry. Infant showing feeding cues. Discussed recognition of feeding cues and importance of feeding infant on demand, at least 8 times in a 24 hour period.     Attempted to verbally assist mother to latch her baby. Mother was not able to fully return demonstration, and she consented to hands-on assistance. Mother was able to demonstrate hand expression with verbal coaching. Colostrum readily observed. Infant latched to left side after several attempts, and deep jaw movement with occasional swallow was noted.     Breastfeeding Guide brought to bedside and reviewed with parents. Mother stated that infant had detached from breast during retrieval of guide. Infant was repositioned to right cradle hold, where he latched briefly to breast with a swallow noted. He then became sleepy. Encouraged mother to continue to observe for cues and to relatch infant as indicated.     Admit teaching done, including expectations for infant feeding/behavior in first day of life, importance of skin to skin contact. Discussed mother's hepatitis C status and implications for breastfeeding. Mother stated that she no longer has hepatitis C and that that is why she has chosen to try breastfeeding this time. Instructed mother to be mindful of achieving deep latch and avoiding nipple damage, regardless of hepatitis C status. Encouraged mother to call for assistance with latch as needed. Lactation phone number was provided.

## 2020-09-11 NOTE — ANESTHESIA POSTPROCEDURE EVALUATION
Anesthesia Post Evaluation    Patient: Shruthi Riley    Procedure(s) Performed: * No procedures listed *    Final Anesthesia Type: epidural    Patient location during evaluation: labor & delivery  Patient participation: Yes- Able to Participate  Level of consciousness: awake and alert and awake  Post-procedure vital signs: reviewed and stable  Pain management: adequate  Airway patency: patent    PONV status at discharge: No PONV  Anesthetic complications: no      Cardiovascular status: blood pressure returned to baseline and hemodynamically stable  Respiratory status: unassisted and spontaneous ventilation  Hydration status: euvolemic  Follow-up not needed.          Vitals Value Taken Time   /60 09/11/20 1403   Temp 36.8 °C (98.2 °F) 09/11/20 1038   Pulse 70 09/11/20 1412   Resp 20 09/11/20 1418   SpO2 100 % 09/11/20 1412   Vitals shown include unvalidated device data.      No case tracking events are documented in the log.      Pain/Joel Score: No data recorded

## 2020-09-11 NOTE — HOSPITAL COURSE
9/11/20 0930 Admit for labor, Pitocin per protocol, epidural PRN  9/12/20- PPD1, routine postpartum orders   9/13/20 PPD2 discharge home

## 2020-09-12 DIAGNOSIS — O92.70 LACTATION DISORDER: Primary | ICD-10-CM

## 2020-09-12 PROCEDURE — 25000003 PHARM REV CODE 250: Performed by: ADVANCED PRACTICE MIDWIFE

## 2020-09-12 PROCEDURE — 25000003 PHARM REV CODE 250: Performed by: MIDWIFE

## 2020-09-12 PROCEDURE — 99231 PR SUBSEQUENT HOSPITAL CARE,LEVL I: ICD-10-PCS | Mod: ,,, | Performed by: MIDWIFE

## 2020-09-12 PROCEDURE — 99231 SBSQ HOSP IP/OBS SF/LOW 25: CPT | Mod: ,,, | Performed by: MIDWIFE

## 2020-09-12 PROCEDURE — 11000001 HC ACUTE MED/SURG PRIVATE ROOM

## 2020-09-12 RX ORDER — POLYETHYLENE GLYCOL 3350 17 G/17G
17 POWDER, FOR SOLUTION ORAL DAILY
Status: DISCONTINUED | OUTPATIENT
Start: 2020-09-13 | End: 2020-09-13 | Stop reason: HOSPADM

## 2020-09-12 RX ORDER — KETOROLAC TROMETHAMINE 10 MG/1
10 TABLET, FILM COATED ORAL EVERY 6 HOURS PRN
Status: DISCONTINUED | OUTPATIENT
Start: 2020-09-12 | End: 2020-09-13 | Stop reason: HOSPADM

## 2020-09-12 RX ORDER — ADHESIVE BANDAGE
30 BANDAGE TOPICAL DAILY PRN
Status: DISCONTINUED | OUTPATIENT
Start: 2020-09-12 | End: 2020-09-13 | Stop reason: HOSPADM

## 2020-09-12 RX ADMIN — IBUPROFEN 600 MG: 600 TABLET, FILM COATED ORAL at 11:09

## 2020-09-12 RX ADMIN — DOCUSATE SODIUM 100 MG: 100 CAPSULE, LIQUID FILLED ORAL at 08:09

## 2020-09-12 RX ADMIN — KETOROLAC TROMETHAMINE 10 MG: 10 TABLET, FILM COATED ORAL at 09:09

## 2020-09-12 RX ADMIN — IBUPROFEN 600 MG: 600 TABLET, FILM COATED ORAL at 05:09

## 2020-09-12 RX ADMIN — MAGNESIUM HYDROXIDE 2400 MG: 400 SUSPENSION ORAL at 09:09

## 2020-09-12 RX ADMIN — HYDROCODONE BITARTRATE AND ACETAMINOPHEN 1 TABLET: 5; 325 TABLET ORAL at 05:09

## 2020-09-12 RX ADMIN — HYDROCODONE BITARTRATE AND ACETAMINOPHEN 1 TABLET: 7.5; 325 TABLET ORAL at 11:09

## 2020-09-12 RX ADMIN — PRENATAL VITAMINS-IRON FUMARATE 27 MG IRON-FOLIC ACID 0.8 MG TABLET 1 TABLET: at 08:09

## 2020-09-12 NOTE — PLAN OF CARE
Mother requesting supplementation as per her own request. Reviewed risks of supplementation. Discussed adequacy of colostrum. Instructed mother on normal  feeding and sleeping patterns. Encouraged mother to breastfeed infant a minimum of 8 times in 24 hours prior to supplementation to promote appropriate breast stimulation for adequate milk supply. Discussed with mother preferred alternative feeding methods, such as supplement infant at breast via SNS, syringe, spoon, or cup feeding. Discussed risks and encouraged mother to avoid artificial nipples and bottles. Mother chooses to supplement infant via nipple.  Mother taught how to safely feed infant via this method. Demonstrated by nurse and mother return demonstrates proper and safe usage.   Because baby is being supplemented away from the breast, mother was:   - informed that breastfeeding support and assistance is available as needed  - encouraged to express milk from both breasts each time a supplement is given  - encouraged to use her own collected milk as a first choice for supplementation  Mother was encouraged to request assistance as needed and voices understanding.

## 2020-09-12 NOTE — PLAN OF CARE
Patient afebrile and had no falls this shift. Fundus firm without massage. Bleeding light, no clots passed this shift. Voids spontaneously. Ambulates independently. Pain well controlled with oral pain medication. Vital signs stable at this time. Bonding well with infant; responds to infant cues and participates in infant care. Will continue to monitor.

## 2020-09-12 NOTE — LACTATION NOTE
Lactation Rounds:     Notified by bedside nurse that mother had requested and given a bottle of formula to infant for most recent feeding. Mother had also requested a hand pump at this time.     Visited mother at bedside. She stated that she wasn't sure how she could make breastfeeding work with her life with an active toddler at home. She would like to do some formula feeding. She is uncertain of her breastfeeding goals but would like for her baby to receive some benefits of breastfeeding and to experience some herself, such as uterine cramping to reduce risk of postpartum hemorrhage. Discussed that mother may breastfeed and formula feed if desired, and discussed management of this. Mother would like to try hand pump.     Hand pump brought to bedside with instructions for use. Mother verbalized her understanding of education. Materials for washing pump parts also provided with instruction. Mother denied further needs at this time. She was encouraged to call as desired.

## 2020-09-12 NOTE — SUBJECTIVE & OBJECTIVE
Interval History:     She is doing well this morning. She is tolerating a regular diet without nausea or vomiting. She is voiding spontaneously. She is ambulating. Vaginal bleeding is mild. She denies fever or chills. Abdominal pain is mild and controlled with oral medications. She is bottle and breastfeeding.     Objective:     Vital Signs (Most Recent):  Temp: 97.8 °F (36.6 °C) (09/12/20 0806)  Pulse: (!) 59 (09/12/20 0806)  Resp: 18 (09/12/20 0806)  BP: (!) 101/58 (09/12/20 0806)  SpO2: 98 % (09/12/20 0445) Vital Signs (24h Range):  Temp:  [97.8 °F (36.6 °C)-98.5 °F (36.9 °C)] 97.8 °F (36.6 °C)  Pulse:  [59-79] 59  Resp:  [18] 18  SpO2:  [98 %-100 %] 98 %  BP: (101-132)/(58-70) 101/58     Weight: 90.7 kg (200 lb)  Body mass index is 29.53 kg/m².      Intake/Output Summary (Last 24 hours) at 9/12/2020 0911  Last data filed at 9/11/2020 1346  Gross per 24 hour   Intake 1500 ml   Output 100 ml   Net 1400 ml           Significant Labs:  Lab Results   Component Value Date    GROUPTRH O POS 09/11/2020    HEPBSAG Negative 01/27/2020    STREPBCULT No Group B Streptococcus isolated 08/23/2020     Recent Labs   Lab 09/11/20  0545   HGB 11.5*   HCT 34.2*       I have personallly reviewed all pertinent lab results from the last 24 hours.    Physical Exam:   Constitutional: She is oriented to person, place, and time. She appears well-developed and well-nourished.    HENT:   Head: Normocephalic.    Eyes: Conjunctivae are normal.    Neck: Normal range of motion.     Pulmonary/Chest: Effort normal.        Abdominal: Soft.             Musculoskeletal: Normal range of motion.       Neurological: She is alert and oriented to person, place, and time.    Skin: Skin is warm and dry.    Psychiatric: She has a normal mood and affect. Her behavior is normal. Judgment and thought content normal.

## 2020-09-12 NOTE — LACTATION NOTE
Visited mother at bedside. Mother is confused about her feeding plan with a toddler at home. Reviewed her goals and answered her questions.   Reviewed the use of hand pump, and gave mother a copy of her breast pump RX.   Ongoing education provided including correct positioning and latch, signs of an effective feeding, early feeding cues and baby-led feeds, frequency of feeds including the normality of cluster feeding, hand expression, exclusive breastfeeding for 6 months, as well as when and  how to seek the assistance of a qualified health care professional for concerns related to  feeding.

## 2020-09-13 VITALS
RESPIRATION RATE: 18 BRPM | SYSTOLIC BLOOD PRESSURE: 120 MMHG | OXYGEN SATURATION: 98 % | HEART RATE: 67 BPM | HEIGHT: 69 IN | TEMPERATURE: 98 F | WEIGHT: 200 LBS | BODY MASS INDEX: 29.62 KG/M2 | DIASTOLIC BLOOD PRESSURE: 71 MMHG

## 2020-09-13 PROBLEM — Z34.80 SUPERVISION OF OTHER NORMAL PREGNANCY: Status: RESOLVED | Noted: 2020-07-21 | Resolved: 2020-09-13

## 2020-09-13 PROBLEM — Z34.90 ENCOUNTER FOR PLANNED INDUCTION OF LABOR: Status: RESOLVED | Noted: 2020-09-11 | Resolved: 2020-09-13

## 2020-09-13 PROCEDURE — 25000003 PHARM REV CODE 250: Performed by: MIDWIFE

## 2020-09-13 PROCEDURE — 99238 PR HOSPITAL DISCHARGE DAY,<30 MIN: ICD-10-PCS | Mod: ,,, | Performed by: ADVANCED PRACTICE MIDWIFE

## 2020-09-13 PROCEDURE — 25000003 PHARM REV CODE 250: Performed by: ADVANCED PRACTICE MIDWIFE

## 2020-09-13 PROCEDURE — 99238 HOSP IP/OBS DSCHRG MGMT 30/<: CPT | Mod: ,,, | Performed by: ADVANCED PRACTICE MIDWIFE

## 2020-09-13 PROCEDURE — 63600175 PHARM REV CODE 636 W HCPCS: Performed by: ADVANCED PRACTICE MIDWIFE

## 2020-09-13 RX ORDER — KETOROLAC TROMETHAMINE 10 MG/1
10 TABLET, FILM COATED ORAL EVERY 6 HOURS PRN
Qty: 20 TABLET | Refills: 0 | Status: SHIPPED | OUTPATIENT
Start: 2020-09-13 | End: 2020-09-18

## 2020-09-13 RX ORDER — MEDROXYPROGESTERONE ACETATE 150 MG/ML
150 INJECTION, SUSPENSION INTRAMUSCULAR ONCE
Status: COMPLETED | OUTPATIENT
Start: 2020-09-13 | End: 2020-09-13

## 2020-09-13 RX ADMIN — POLYETHYLENE GLYCOL 3350 17 G: 17 POWDER, FOR SOLUTION ORAL at 08:09

## 2020-09-13 RX ADMIN — KETOROLAC TROMETHAMINE 10 MG: 10 TABLET, FILM COATED ORAL at 11:09

## 2020-09-13 RX ADMIN — PRENATAL VITAMINS-IRON FUMARATE 27 MG IRON-FOLIC ACID 0.8 MG TABLET 1 TABLET: at 08:09

## 2020-09-13 RX ADMIN — HYDROCODONE BITARTRATE AND ACETAMINOPHEN 1 TABLET: 5; 325 TABLET ORAL at 09:09

## 2020-09-13 RX ADMIN — MEDROXYPROGESTERONE ACETATE 150 MG: 150 INJECTION, SUSPENSION, EXTENDED RELEASE INTRAMUSCULAR at 11:09

## 2020-09-13 RX ADMIN — DOCUSATE SODIUM 100 MG: 100 CAPSULE, LIQUID FILLED ORAL at 08:09

## 2020-09-13 RX ADMIN — KETOROLAC TROMETHAMINE 10 MG: 10 TABLET, FILM COATED ORAL at 05:09

## 2020-09-13 NOTE — PROGRESS NOTES
[facility] -   Obstetrics  Postpartum Progress Note    Patient Name: Shruthi Riley  MRN: [id number]  Admission Date: 9/11/2020  Hospital Length of Stay: 2 days  Attending Physician: Terri uBeno MD  Primary Care Provider: Primary Doctor No    Subjective:     Principal Problem:Normal vaginal delivery    Hospital Course:  9/11/20 0930 Admit for labor, Pitocin per protocol, epidural PRN  9/12/20- PPD1, routine postpartum orders   9/13/20 PPD2 discharge home    Interval History:     She is doing well this morning. She is tolerating a regular diet without nausea or vomiting. She is voiding spontaneously. She is ambulating. Vaginal bleeding is mild. She denies fever or chills. Abdominal pain is mild and controlled with oral medications. She is bottle and breastfeeding.     Objective:     Vital Signs (Most Recent):  Temp: 97.8 °F (36.6 °C) (09/12/20 0806)  Pulse: (!) 59 (09/12/20 0806)  Resp: 18 (09/12/20 0806)  BP: (!) 101/58 (09/12/20 0806)  SpO2: 98 % (09/12/20 0445) Vital Signs (24h Range):  Temp:  [97.8 °F (36.6 °C)-98.5 °F (36.9 °C)] 97.8 °F (36.6 °C)  Pulse:  [59-79] 59  Resp:  [18] 18  SpO2:  [98 %-100 %] 98 %  BP: (101-132)/(58-70) 101/58     Weight: 90.7 kg (200 lb)  Body mass index is 29.53 kg/m².      Intake/Output Summary (Last 24 hours) at 9/12/2020 0911  Last data filed at 9/11/2020 1346  Gross per 24 hour   Intake 1500 ml   Output 100 ml   Net 1400 ml           Significant Labs:  Lab Results   Component Value Date    GROUPTRH O POS 09/11/2020    HEPBSAG Negative 01/27/2020    STREPBCULT No Group B Streptococcus isolated 08/23/2020     Recent Labs   Lab 09/11/20  0545   HGB 11.5*   HCT 34.2*       I have personallly reviewed all pertinent lab results from the last 24 hours.    Physical Exam:   Constitutional: She is oriented to person, place, and time. She appears well-developed and well-nourished.    HENT:   Head: Normocephalic.    Eyes: Conjunctivae are normal.    Neck: Normal range of  motion.     Pulmonary/Chest: Effort normal.        Abdominal: Soft.             Musculoskeletal: Normal range of motion.       Neurological: She is alert and oriented to person, place, and time.    Skin: Skin is warm and dry.    Psychiatric: She has a normal mood and affect. Her behavior is normal. Judgment and thought content normal.       Assessment/Plan:     30 y.o. female  for:    Normal spontaneous vaginal delivery  Routine pp care  Discharge home    Anemia during pregnancy in second trimester  CBC on admit    History of IUFD  IOL per guidelines    HSV-2 (herpes simplex virus 2) infection  Taking Valtrex for suppression    History of substance abuse  UDS negative 2 weeks ago    Chronic hepatitis C without hepatic coma  Followed by hepatology        Disposition: As patient meets milestones, will plan to discharge today    Yancy Flores CNM  Obstetrics  [facility]

## 2020-09-13 NOTE — PLAN OF CARE
VSS. Bleeding light. Fundus firm without massage. AVS handout given. Discharged to car via wheelchair with infant in arms by staff.

## 2020-09-13 NOTE — DISCHARGE INSTRUCTIONS

## 2020-09-13 NOTE — DISCHARGE SUMMARY
[facility] -   Obstetrics  Discharge Summary      Patient Name: Shruthi Riley  MRN: [id number]  Admission Date: 9/11/2020  Hospital Length of Stay: 2 days  Discharge Date and Time:  09/13/2020 10:58 AM  Attending Physician: Terri Bueno MD   Discharging Provider: Yancy Flores CNM   Primary Care Provider: Primary Doctor No        * No surgery found *     Hospital Course:   9/11/20 0930 Admit for labor, Pitocin per protocol, epidural PRN  9/12/20- PPD1, routine postpartum orders   9/13/20 PPD2 discharge home         Final Active Diagnoses:    Diagnosis Date Noted POA    PRINCIPAL PROBLEM:  Normal vaginal delivery [O80] 09/11/2020 Not Applicable    Single live birth [Z37.0] 09/11/2020 Not Applicable    Normal spontaneous vaginal delivery [O80] 09/11/2020 Not Applicable    Anemia during pregnancy in second trimester [O99.012] 06/25/2020 Yes    History of IUFD [Z87.59] 03/04/2020 Not Applicable    HSV-2 (herpes simplex virus 2) infection [B00.9] 02/10/2020 Yes    History of substance abuse [F19.11] 01/27/2020 Yes    Chronic hepatitis C without hepatic coma [B18.2] 06/20/2018 Yes      Problems Resolved During this Admission:    Diagnosis Date Noted Date Resolved POA    Encounter for planned induction of labor [Z34.90] 09/11/2020 09/13/2020 Not Applicable        Significant Diagnostic Studies: Labs: CBC No results for input(s): WBC, HGB, HCT, PLT in the last 48 hours.      Feeding Method: bottle    Immunizations     None          Delivery:    Episiotomy: None   Lacerations: None   Repair suture:     Repair # of packets:     Blood loss (ml): 100     Birth information:  YOB: 2019   Time of birth: 1:46 PM   Sex: male   Delivery type: Vaginal, Spontaneous   Gestational Age: 39w0d    Delivery Clinician:      Other providers:       Additional  information:  Forceps:    Vacuum:    Breech:    Observed anomalies      Living?:           APGARS  One minute Five minutes Ten minutes   Skin  color:         Heart rate:         Grimace:         Muscle tone:         Breathing:         Totals: 9  9        Placenta: Delivered:       appearance    Pending Diagnostic Studies:     None          Discharged Condition: good    Disposition: Home or Self Care    Follow Up:  Follow-up Information     Follow up In 4 weeks.               Patient Instructions:      Diet Adult Regular     Pelvic Rest     Activity as tolerated     Medications:  Current Discharge Medication List      START taking these medications    Details   ketorolac (TORADOL) 10 mg tablet Take 1 tablet (10 mg total) by mouth every 6 (six) hours as needed.  Qty: 20 tablet, Refills: 0         CONTINUE these medications which have NOT CHANGED    Details   ferrous sulfate (FEOSOL) 325 mg (65 mg iron) Tab tablet Take 1 tablet (325 mg total) by mouth once daily.  Qty: 30 tablet, Refills: 3      prenatal vit calc,iron,folic (PRENATAL VITAMIN ORAL) Take 1 tablet by mouth once daily.         STOP taking these medications       valACYclovir (VALTREX) 500 MG tablet Comments:   Reason for Stopping:         amoxicillin (AMOXIL) 500 MG capsule Comments:   Reason for Stopping:               Yancy Flores CNM  Obstetrics  [facility]

## 2020-09-14 ENCOUNTER — TELEPHONE (OUTPATIENT)
Dept: GASTROENTEROLOGY | Facility: CLINIC | Age: 30
End: 2020-09-14

## 2020-09-14 NOTE — TELEPHONE ENCOUNTER
----- Message from Radha Delarosa sent at 9/14/2020 11:41 AM CDT -----  Patient called in regards to her ultrasound , patient is not sure if dr. Vernon wanted to wait until her after her postpartum or schedule now , please call back at  [phone].      Thanks,  Radha Delarosa     Nursing/Physical therapy/Occupational therapy

## 2020-09-14 NOTE — TELEPHONE ENCOUNTER
Attempted to contact patient at [phone] with no answer. Was unable to leave a voicemail message.    Dr. Vernon would like for patient to have ultrasound as soon as possible can when postpartum.

## 2020-09-17 ENCOUNTER — TELEPHONE (OUTPATIENT)
Dept: LACTATION | Facility: CLINIC | Age: 30
End: 2020-09-17

## 2020-09-22 NOTE — H&P
[facility] -   Obstetrics  Labor History and Physical    Patient Name: Shruthi Riley  MRN: [id number]  Admission Date: 2020  Hospital Length of Stay: 2 days  Attending Physician: No att. providers found  Primary Care Provider: Primary Doctor No    Subjective:     Principal Problem:Normal vaginal delivery    Hospital Course:  Admit for pitocin induction of labor    Obstetric HPI:  This pregnancy has been complicated by:  -Anemia  -History of IUFD at 26 weeks  -Chronic Hepatitis C  -History of substance abuse                      OB History    Para Term  AB Living   5 4 2 2 0 3   SAB TAB Ectopic Multiple Live Births      0 0 0 0 3          # Outcome Date GA Lbr Kris/2nd Weight Sex Delivery Anes PTL Lv   5 Current                     4 Term 18 40w3d   4.082 kg (9 lb) F Vag-Spont EPI N JEN      Name: JEY RILEY      Apgar1: 8  Apgar5: 9   3  01/01/15 26w0d     U       FD      Birth Comments: IUFD unk etiology   2 Term 10/10/13 40w0d   1.814 kg (4 lb) M Vag-Spont EPI   JEN      Birth Comments: pt does not have custoday-live in [place]   1  02/21/10 32w0d   3.289 kg (7 lb 4 oz) M Vag-Spont EPI   JEN      Birth Comments: pt does not have custoday-live in [place]           Past Medical History:   Diagnosis Date    Hepatitis C      History of trichomoniasis      Miscarriage              Past Surgical History:   Procedure Laterality Date    DILATION AND CURETTAGE OF UTERUS        TONSILLECTOMY                  PTA Medications   Medication Sig    ferrous sulfate (FEOSOL) 325 mg (65 mg iron) Tab tablet Take 1 tablet (325 mg total) by mouth once daily.    prenatal vit calc,iron,folic (PRENATAL VITAMIN ORAL) Take 1 tablet by mouth once daily.    valACYclovir (VALTREX) 500 MG tablet Take 1 tablet (500 mg total) by mouth once daily.    amoxicillin (AMOXIL) 500 MG capsule Take 1 capsule (500 mg total) by mouth every 8 (eight) hours. (Patient not taking: Reported  on 9/3/2020)         Review of patient's allergies indicates:  No Known Allergies           Family History      Problem Relation (Age of Onset)     Heart defect Mother                Tobacco Use    Smoking status: Current Every Day Smoker       Packs/day: 0.50       Types: Cigarettes    Smokeless tobacco: Never Used   Substance and Sexual Activity    Alcohol use: No    Drug use: Not Currently       Comment: polysubstance abuser     Sexual activity: Yes       Partners: Male       Birth control/protection: None      Review of Systems   Gastrointestinal: Negative for abdominal pain, nausea and vomiting.   Genitourinary: Negative for vaginal bleeding and vaginal discharge.   All other systems reviewed and are negative.     Objective:      Vital Signs (Most Recent):  Pulse: 77 (09/11/20 0715)  BP: 112/69 (09/11/20 0715)  SpO2: 99 % (09/11/20 0715) Vital Signs (24h Range):  Pulse:  [76-93] 77  SpO2:  [98 %-100 %] 99 %  BP: (111-120)/(64-69) 112/69      Weight: 90.7 kg (200 lb)  Body mass index is 29.53 kg/m².     FHT: Cat 1 (reassuring) baseline 135, moderate variability, 15x15 accels, no decelerations  TOCO:  Q 3-5 minutes     Physical Exam:   Constitutional: She is oriented to person, place, and time. She appears well-developed and well-nourished.      Neck: Normal range of motion.    Cardiovascular: Regular rhythm.     Pulmonary/Chest: Effort normal.         Abdominal: Soft.   Gravid, EFW 7.5 lbs     Genitourinary:    Vagina normal.             Musculoskeletal: Normal range of motion.       Neurological: She is alert and oriented to person, place, and time.    Skin: Skin is warm and dry.    Psychiatric: She has a normal mood and affect. Her behavior is normal.         Cervix:  Dilation:            5  Effacement:            75%  Station:            -2  Presentation:            Vertex      Significant Labs:        Lab Results   Component Value Date     GROUPTRH O POS 09/11/2020     HEPBSAG Negative 01/27/2020      STREPBCULT No Group B Streptococcus isolated 2020         I have personallly reviewed all pertinent lab results from the last 24 hours.         Assessment/Plan:     30 y.o. female  at 39w0d for:    Assessment & plan notes cannot be loaded without a specified hospital service.      LILI Beckman CNM  Obstetrics  [facility]

## 2020-10-02 ENCOUNTER — PATIENT MESSAGE (OUTPATIENT)
Dept: OBSTETRICS AND GYNECOLOGY | Facility: CLINIC | Age: 30
End: 2020-10-02

## 2020-10-05 ENCOUNTER — TELEPHONE (OUTPATIENT)
Dept: OBSTETRICS AND GYNECOLOGY | Facility: CLINIC | Age: 30
End: 2020-10-05

## 2020-10-05 NOTE — TELEPHONE ENCOUNTER
"Patient stated "I am having a hard time" and I was told I could come in tomorrow to see AL.  Checked schedule and AL is not in clinic tomorrow.  Scheduled patient for virtual with ANTONIETA tomorrow.  "

## 2020-10-06 ENCOUNTER — OFFICE VISIT (OUTPATIENT)
Dept: OBSTETRICS AND GYNECOLOGY | Facility: CLINIC | Age: 30
End: 2020-10-06
Payer: MEDICAID

## 2020-10-06 DIAGNOSIS — F41.9 ANXIETY: Primary | ICD-10-CM

## 2020-10-06 PROCEDURE — 99212 PR OFFICE/OUTPT VISIT, EST, LEVL II, 10-19 MIN: ICD-10-PCS | Mod: 95,24,TH, | Performed by: MIDWIFE

## 2020-10-06 PROCEDURE — 99212 OFFICE O/P EST SF 10 MIN: CPT | Mod: 95,24,TH, | Performed by: MIDWIFE

## 2020-10-06 RX ORDER — ESCITALOPRAM OXALATE 10 MG/1
10 TABLET ORAL DAILY
Qty: 30 TABLET | Refills: 2 | Status: SHIPPED | OUTPATIENT
Start: 2020-10-06 | End: 2020-10-26

## 2020-10-08 NOTE — PROGRESS NOTES
The patient location is: home  The chief complaint leading to consultation is: PP depression    Visit type: audiovisual    Face to Face time with patient: 15  15 minutes of total time spent on the encounter, which includes face to face time and non-face to face time preparing to see the patient (eg, review of tests), Obtaining and/or reviewing separately obtained history, Documenting clinical information in the electronic or other health record, Independently interpreting results (not separately reported) and communicating results to the patient/family/caregiver, or Care coordination (not separately reported).         Each patient to whom he or she provides medical services by telemedicine is:  (1) informed of the relationship between the physician and patient and the respective role of any other health care provider with respect to management of the patient; and (2) notified that he or she may decline to receive medical services by telemedicine and may withdraw from such care at any time.    Notes:     Pt is concerned that her anxiety/depression is getting worse, she was given zoloft and stated it made it worse, denies SI/HI, was on Lexapro before and worked, will restart Lexapro, will call if any new symptoms

## 2020-10-14 ENCOUNTER — TELEPHONE (OUTPATIENT)
Dept: OBSTETRICS AND GYNECOLOGY | Facility: CLINIC | Age: 30
End: 2020-10-14

## 2020-10-14 NOTE — TELEPHONE ENCOUNTER
Spoke with pt's , states that he is concerned for his wife. States that he feels like she is having hallucinations but does not feel like she is a threat to the herself or the baby. States that he feels like she is lying. States that she had an appointment with her psychiatrist but does not feel like she got the help she needed. Advised pt's  that I can not discuss her chart with him without an evolvement of care form signed. Verbalized understanding. Attempted to contact pt to see how she is feeling. Unable to leave voice mail.

## 2020-10-14 NOTE — TELEPHONE ENCOUNTER
----- Message from Na Pulliam sent at 10/14/2020 12:09 PM CDT -----  Regarding: Advice  Contact: Patient's - [place]  Please call patient's  concerning patient's depression and mood swings at Ph [phone] or [phone]

## 2020-10-16 ENCOUNTER — PATIENT MESSAGE (OUTPATIENT)
Dept: OBSTETRICS AND GYNECOLOGY | Facility: CLINIC | Age: 30
End: 2020-10-16

## 2020-10-20 ENCOUNTER — PATIENT MESSAGE (OUTPATIENT)
Dept: OBSTETRICS AND GYNECOLOGY | Facility: CLINIC | Age: 30
End: 2020-10-20

## 2020-10-26 ENCOUNTER — LAB VISIT (OUTPATIENT)
Dept: LAB | Facility: HOSPITAL | Age: 30
End: 2020-10-26
Attending: ADVANCED PRACTICE MIDWIFE
Payer: MEDICAID

## 2020-10-26 ENCOUNTER — POSTPARTUM VISIT (OUTPATIENT)
Dept: OBSTETRICS AND GYNECOLOGY | Facility: CLINIC | Age: 30
End: 2020-10-26
Payer: MEDICAID

## 2020-10-26 VITALS
WEIGHT: 181 LBS | SYSTOLIC BLOOD PRESSURE: 108 MMHG | DIASTOLIC BLOOD PRESSURE: 70 MMHG | BODY MASS INDEX: 26.81 KG/M2 | HEIGHT: 69 IN

## 2020-10-26 DIAGNOSIS — Z86.59 HISTORY OF POSTPARTUM PSYCHOSIS: Primary | ICD-10-CM

## 2020-10-26 DIAGNOSIS — Z20.2 STD EXPOSURE: ICD-10-CM

## 2020-10-26 PROBLEM — O99.012 ANEMIA DURING PREGNANCY IN SECOND TRIMESTER: Status: RESOLVED | Noted: 2020-06-25 | Resolved: 2020-10-26

## 2020-10-26 LAB
ALBUMIN SERPL BCP-MCNC: 4.2 G/DL (ref 3.5–5.2)
ALP SERPL-CCNC: 66 U/L (ref 55–135)
ALT SERPL W/O P-5'-P-CCNC: 25 U/L (ref 10–44)
ANION GAP SERPL CALC-SCNC: 9 MMOL/L (ref 8–16)
AST SERPL-CCNC: 17 U/L (ref 10–40)
BILIRUB SERPL-MCNC: 0.4 MG/DL (ref 0.1–1)
BUN SERPL-MCNC: 11 MG/DL (ref 6–20)
CALCIUM SERPL-MCNC: 9.1 MG/DL (ref 8.7–10.5)
CHLORIDE SERPL-SCNC: 106 MMOL/L (ref 95–110)
CO2 SERPL-SCNC: 24 MMOL/L (ref 23–29)
CREAT SERPL-MCNC: 0.8 MG/DL (ref 0.5–1.4)
EST. GFR  (AFRICAN AMERICAN): >60 ML/MIN/1.73 M^2
EST. GFR  (NON AFRICAN AMERICAN): >60 ML/MIN/1.73 M^2
GLUCOSE SERPL-MCNC: 83 MG/DL (ref 70–110)
POTASSIUM SERPL-SCNC: 4.2 MMOL/L (ref 3.5–5.1)
PROT SERPL-MCNC: 6.9 G/DL (ref 6–8.4)
SODIUM SERPL-SCNC: 139 MMOL/L (ref 136–145)
T4 FREE SERPL-MCNC: 1.03 NG/DL (ref 0.71–1.51)

## 2020-10-26 PROCEDURE — 99213 OFFICE O/P EST LOW 20 MIN: CPT | Mod: PBBFAC,TH,PO | Performed by: ADVANCED PRACTICE MIDWIFE

## 2020-10-26 PROCEDURE — 86703 HIV-1/HIV-2 1 RESULT ANTBDY: CPT

## 2020-10-26 PROCEDURE — 59430 PR CARE AFTER DELIVERY ONLY: ICD-10-PCS | Mod: TH,,, | Performed by: ADVANCED PRACTICE MIDWIFE

## 2020-10-26 PROCEDURE — 87491 CHLMYD TRACH DNA AMP PROBE: CPT

## 2020-10-26 PROCEDURE — 99999 PR PBB SHADOW E&M-EST. PATIENT-LVL III: ICD-10-PCS | Mod: PBBFAC,,, | Performed by: ADVANCED PRACTICE MIDWIFE

## 2020-10-26 PROCEDURE — 99999 PR PBB SHADOW E&M-EST. PATIENT-LVL III: CPT | Mod: PBBFAC,,, | Performed by: ADVANCED PRACTICE MIDWIFE

## 2020-10-26 PROCEDURE — 84481 FREE ASSAY (FT-3): CPT

## 2020-10-26 PROCEDURE — 36415 COLL VENOUS BLD VENIPUNCTURE: CPT | Mod: PO

## 2020-10-26 PROCEDURE — 86592 SYPHILIS TEST NON-TREP QUAL: CPT

## 2020-10-26 PROCEDURE — 84443 ASSAY THYROID STIM HORMONE: CPT

## 2020-10-26 PROCEDURE — 84439 ASSAY OF FREE THYROXINE: CPT

## 2020-10-26 PROCEDURE — 80053 COMPREHEN METABOLIC PANEL: CPT

## 2020-10-26 PROCEDURE — 87340 HEPATITIS B SURFACE AG IA: CPT

## 2020-10-26 RX ORDER — OXCARBAZEPINE 150 MG/1
300 TABLET, FILM COATED ORAL 2 TIMES DAILY
COMMUNITY
End: 2021-01-08

## 2020-10-26 RX ORDER — LORAZEPAM 2 MG/1
0.5 TABLET ORAL EVERY 6 HOURS PRN
COMMUNITY
End: 2021-01-08

## 2020-10-26 RX ORDER — NORETHINDRONE ACETATE AND ETHINYL ESTRADIOL 1MG-20(21)
1 KIT ORAL DAILY
Qty: 30 TABLET | Refills: 11 | Status: ON HOLD | OUTPATIENT
Start: 2020-10-26 | End: 2020-11-13 | Stop reason: HOSPADM

## 2020-10-26 RX ORDER — BUPROPION HYDROCHLORIDE 100 MG/1
100 TABLET ORAL 2 TIMES DAILY
COMMUNITY
End: 2021-01-08

## 2020-10-26 RX ORDER — QUETIAPINE FUMARATE 100 MG/1
TABLET, FILM COATED ORAL
COMMUNITY
End: 2020-11-10

## 2020-10-26 RX ORDER — VALACYCLOVIR HYDROCHLORIDE 1 G/1
1000 TABLET, FILM COATED ORAL 2 TIMES DAILY PRN
COMMUNITY
End: 2020-12-07 | Stop reason: SDUPTHER

## 2020-10-26 NOTE — PROGRESS NOTES
Subjective:       Patient ID: Shruthi Riley is a 30 y.o. female.    Chief Complaint:  Postpartum Follow-up  recent admission to [facility] 10/16-10/20/20- was planning to flee with her children, triggered by bringing her son, Kenyon, home but calling him Fidencio, name of her son given for adoption d/t drug abuse. Pt states Kenyon looks just like him. Has a 3yo daughter at home as well. Was experiencing panic attacks and delusional thoughts regarding her  and infidelity  Currently on medications-Trileptal and Seroquel  Tearful, was feeling much better while inpatient than at home, denies suicidal/homicidal ideations  States she feels fine when it is just herself and her children, angry with her , fearful he is cheating on her although she has no proof but just suspect  Has established care with psychiatrist- Dr. Jeffry Renteria in [place]-also was supervising MD while inpatient   will be present for follow up appt on 11/3      Patient's last menstrual period was 2019 (approximate).  History of Present Illness  7 weeks Postpartum   PP depression- on meds and counseling  Pap NS 2018    Given Depo Provera in hospital prior to d/c but still desires BTL-consented during pregnancy on 20  Formula feeding   Denies pain/bleeding or new vaginal discharge    OB History    Para Term  AB Living   5 4 2 2 0 3   SAB TAB Ectopic Multiple Live Births   0 0 0 0 3      # Outcome Date GA Lbr Kris/2nd Weight Sex Delivery Anes PTL Lv   5             4 Term 18 40w3d  4.082 kg (9 lb) F Vag-Spont EPI N JEN   3  01/01/15 26w0d   U    FD      Birth Comments: IUFD unk etiology   2 Term 10/10/13 40w0d  1.814 kg (4 lb) M Vag-Spont EPI  JEN      Birth Comments: pt does not have custoday-live in [place]   1  02/21/10 32w0d  3.289 kg (7 lb 4 oz) M Vag-Spont EPI  JEN      Birth Comments: pt does not have custoday-live in [place]       Review of Systems  Review  "of Systems   Constitutional: Negative for appetite change.   Genitourinary: Negative for vaginal bleeding and vaginal pain.   Psychiatric/Behavioral: Positive for sleep disturbance. Negative for suicidal ideas. The patient is nervous/anxious.         Sleep disturbances d/t baby getting up to eat per pt,  helps only when pt does not waken to feed, tearful throughout visit, states "because I am her friend" and she "was fine before she saw me"   All other systems reviewed and are negative.          Objective:    Physical Exam  Vitals signs and nursing note reviewed. Exam conducted with a chaperone present.   Constitutional:       Appearance: Normal appearance.   HENT:      Head: Normocephalic.   Neck:      Musculoskeletal: Normal range of motion.      Comments: Thyroid appears enlarged  Pulmonary:      Effort: Pulmonary effort is normal.   Abdominal:      General: Abdomen is flat.      Palpations: Abdomen is soft.   Genitourinary:     General: Normal vulva.      Vagina: No vaginal discharge.      Comments: Uterus NSS< cx normal  Cultures obtained per request, pt aware of delay in processing  Musculoskeletal: Normal range of motion.   Skin:     General: Skin is warm and dry.   Neurological:      General: No focal deficit present.      Mental Status: She is alert. She is disoriented.   Psychiatric:      Comments: Tearful throughout appt, denies suicidal/homicidal ideations, smiles at times when appropriate           Assessment:     1. History of postpartum psychosis    2. Postpartum depression, postpartum condition    3. STD exposure              Plan:   Shruthi was seen today for postpartum follow-up.    Diagnoses and all orders for this visit:    History of postpartum psychosis    Postpartum depression, postpartum condition  -     TSH; Future  -     T4, Free; Future  -     T3, free; Future  -     Comprehensive Metabolic Panel; Future    STD exposure  -     C. trachomatis/N. gonorrhoeae by AMP DNA  -     RPR; " Future  -     HIV 1/2 Ag/Ab (4th Gen); Future  -     Hepatitis B Surface Antigen; Future    Other orders  -     norethindrone-ethinyl estradiol (JUNEL FE 1/20) 1 mg-20 mcg (21)/75 mg (7) per tablet; Take 1 tablet by mouth once daily.    reassurance and support provided. Strongly encouraged pt to continue open communication with spouse, set a feeding schedule so that both are getting as much rest as possible. Continue outpatient therapy including spouse as well. Labs today with thyroid panel & CMP( was told potassium elevated and sodium low while inpatient, ?? Side effect of medications), pap current 2018 NS, desires BTL- surgical consult with MD scheduled, report worsening s/s, low dose OCP to see if estrogen may help with mood elevation. Continue Trileptal and Seroquel as ordered by psychiatry.

## 2020-10-26 NOTE — Clinical Note
Please make sure pt's Medicaid is current and call her to assist with florentino if not. ThanksiNrali

## 2020-10-27 LAB
HBV SURFACE AG SERPL QL IA: NEGATIVE
HIV 1+2 AB+HIV1 P24 AG SERPL QL IA: NEGATIVE
RPR SER QL: NORMAL
T3FREE SERPL-MCNC: 2.7 PG/ML (ref 2.3–4.2)
TSH SERPL DL<=0.005 MIU/L-ACNC: 1.06 UIU/ML (ref 0.4–4)

## 2020-10-28 ENCOUNTER — OFFICE VISIT (OUTPATIENT)
Dept: OBSTETRICS AND GYNECOLOGY | Facility: CLINIC | Age: 30
End: 2020-10-28
Payer: MEDICAID

## 2020-10-28 ENCOUNTER — TELEPHONE (OUTPATIENT)
Dept: GASTROENTEROLOGY | Facility: CLINIC | Age: 30
End: 2020-10-28

## 2020-10-28 ENCOUNTER — TELEPHONE (OUTPATIENT)
Dept: OBSTETRICS AND GYNECOLOGY | Facility: CLINIC | Age: 30
End: 2020-10-28

## 2020-10-28 VITALS
DIASTOLIC BLOOD PRESSURE: 62 MMHG | WEIGHT: 182.75 LBS | SYSTOLIC BLOOD PRESSURE: 106 MMHG | BODY MASS INDEX: 27.07 KG/M2 | HEIGHT: 69 IN

## 2020-10-28 DIAGNOSIS — Z30.8 ENCOUNTER FOR TUBAL LIGATION COUNSELING: Primary | ICD-10-CM

## 2020-10-28 DIAGNOSIS — Z01.818 PREOP TESTING: ICD-10-CM

## 2020-10-28 DIAGNOSIS — Z30.2 ENCOUNTER FOR FEMALE STERILIZATION PROCEDURE: Primary | ICD-10-CM

## 2020-10-28 LAB
C TRACH DNA SPEC QL NAA+PROBE: NOT DETECTED
N GONORRHOEA DNA SPEC QL NAA+PROBE: NOT DETECTED

## 2020-10-28 PROCEDURE — 99999 PR PBB SHADOW E&M-EST. PATIENT-LVL III: ICD-10-PCS | Mod: PBBFAC,,, | Performed by: OBSTETRICS & GYNECOLOGY

## 2020-10-28 PROCEDURE — 99213 PR OFFICE/OUTPT VISIT, EST, LEVL III, 20-29 MIN: ICD-10-PCS | Mod: 24,S$PBB,, | Performed by: OBSTETRICS & GYNECOLOGY

## 2020-10-28 PROCEDURE — 99213 OFFICE O/P EST LOW 20 MIN: CPT | Mod: 24,S$PBB,, | Performed by: OBSTETRICS & GYNECOLOGY

## 2020-10-28 PROCEDURE — 99999 PR PBB SHADOW E&M-EST. PATIENT-LVL III: CPT | Mod: PBBFAC,,, | Performed by: OBSTETRICS & GYNECOLOGY

## 2020-10-28 PROCEDURE — 99213 OFFICE O/P EST LOW 20 MIN: CPT | Mod: PBBFAC | Performed by: OBSTETRICS & GYNECOLOGY

## 2020-10-28 RX ORDER — MEDROXYPROGESTERONE ACETATE 150 MG/ML
150 INJECTION, SUSPENSION INTRAMUSCULAR
COMMUNITY
End: 2021-01-08

## 2020-10-28 NOTE — TELEPHONE ENCOUNTER
----- Message from Nita Harris sent at 10/28/2020 12:38 PM CDT -----  Contact: self/[phone]  Would like to consult with nurse regarding a order for a MRI, please call back at [phone]. Thanks/ar

## 2020-10-28 NOTE — TELEPHONE ENCOUNTER
Attempted to contact patient at [phone] with no answer. There was no voicemail option to leave a message.

## 2020-10-28 NOTE — PROGRESS NOTES
Subjective:       Patient ID: Shruthi Riley is a 30 y.o. female.    Chief Complaint:  Consult (Sterilization consult)      History of Present Illness  HPI  Para 5,   On Depo Provera since last birth   Ready for permanent sterilization .  Discussed TL vs salpingectomy, agrees with salpingectomy   Reviewed risks and complications of surgery     PP Psychosis.  Stable since inpatient stay.  Stable on meds.        Health Maintenance   Topic Date Due    Lipid Panel  1991    Pneumococcal Vaccine (Medium Risk) (1 of 1 - PPSV23) 2010    Pap Smear  2021    TETANUS VACCINE  10/09/2028    Hepatitis C Screening  Completed     GYN & OB History  Patient's last menstrual period was 10/26/2020.   Date of Last Pap: No result found    OB History    Para Term  AB Living   5 5 3 2 0 4   SAB TAB Ectopic Multiple Live Births   0 0 0 0 4      # Outcome Date GA Lbr Kris/2nd Weight Sex Delivery Anes PTL Lv   5 Term 20 39w0d  4.111 kg (9 lb 1 oz) M Vag-Spont EPI  JEN   4 Term 18 40w3d  4.082 kg (9 lb) F Vag-Spont EPI N JEN   3  01/01/15 26w0d   U    FD      Birth Comments: IUFD unk etiology   2 Term 10/10/13 40w0d  1.814 kg (4 lb) M Vag-Spont EPI  JEN      Birth Comments: pt does not have custoday-live in [place]   1  02/21/10 32w0d  3.289 kg (7 lb 4 oz) M Vag-Spont EPI  JEN      Birth Comments: pt does not have custoday-live in [place]       Review of Systems  Review of Systems        Objective:   Physical Exam     Assessment:        1. Encounter for tubal ligation counseling                Plan:            Shruthi was seen today for consult.    Diagnoses and all orders for this visit:    Encounter for tubal ligation counseling  Comments:  Schedule for LS bilateral salpingectomy    Consents signed, state and surgical
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
## 2020-11-07 ENCOUNTER — PATIENT MESSAGE (OUTPATIENT)
Dept: GASTROENTEROLOGY | Facility: CLINIC | Age: 30
End: 2020-11-07

## 2020-11-10 ENCOUNTER — HOSPITAL ENCOUNTER (OUTPATIENT)
Dept: PREADMISSION TESTING | Facility: HOSPITAL | Age: 30
Discharge: HOME | End: 2020-11-10
Attending: OBSTETRICS & GYNECOLOGY
Payer: MEDICAID

## 2020-11-10 VITALS
HEART RATE: 70 BPM | OXYGEN SATURATION: 100 % | RESPIRATION RATE: 16 BRPM | TEMPERATURE: 98 F | DIASTOLIC BLOOD PRESSURE: 78 MMHG | SYSTOLIC BLOOD PRESSURE: 123 MMHG

## 2020-11-10 DIAGNOSIS — B18.2 CHRONIC HEPATITIS C WITHOUT HEPATIC COMA: ICD-10-CM

## 2020-11-10 DIAGNOSIS — Z86.59 HISTORY OF POSTPARTUM PSYCHOSIS: ICD-10-CM

## 2020-11-10 DIAGNOSIS — Z30.2 REQUEST FOR STERILIZATION: ICD-10-CM

## 2020-11-10 DIAGNOSIS — Z01.818 PREOP TESTING: Primary | ICD-10-CM

## 2020-11-10 DIAGNOSIS — F19.11 HISTORY OF SUBSTANCE ABUSE: ICD-10-CM

## 2020-11-10 LAB
ANION GAP SERPL CALC-SCNC: 9 MMOL/L (ref 8–16)
BASOPHILS # BLD AUTO: 0.02 K/UL (ref 0–0.2)
BASOPHILS NFR BLD: 0.3 % (ref 0–1.9)
BUN SERPL-MCNC: 16 MG/DL (ref 6–20)
CALCIUM SERPL-MCNC: 8.9 MG/DL (ref 8.7–10.5)
CHLORIDE SERPL-SCNC: 111 MMOL/L (ref 95–110)
CO2 SERPL-SCNC: 21 MMOL/L (ref 23–29)
CREAT SERPL-MCNC: 0.7 MG/DL (ref 0.5–1.4)
DIFFERENTIAL METHOD: NORMAL
EOSINOPHIL # BLD AUTO: 0.1 K/UL (ref 0–0.5)
EOSINOPHIL NFR BLD: 1.7 % (ref 0–8)
ERYTHROCYTE [DISTWIDTH] IN BLOOD BY AUTOMATED COUNT: 11.9 % (ref 11.5–14.5)
EST. GFR  (AFRICAN AMERICAN): >60 ML/MIN/1.73 M^2
EST. GFR  (NON AFRICAN AMERICAN): >60 ML/MIN/1.73 M^2
GLUCOSE SERPL-MCNC: 82 MG/DL (ref 70–110)
HCG INTACT+B SERPL-ACNC: <1.2 MIU/ML
HCT VFR BLD AUTO: 38.4 % (ref 37–48.5)
HGB BLD-MCNC: 12.7 G/DL (ref 12–16)
IMM GRANULOCYTES # BLD AUTO: 0.02 K/UL (ref 0–0.04)
IMM GRANULOCYTES NFR BLD AUTO: 0.3 % (ref 0–0.5)
LYMPHOCYTES # BLD AUTO: 2.4 K/UL (ref 1–4.8)
LYMPHOCYTES NFR BLD: 36.3 % (ref 18–48)
MCH RBC QN AUTO: 30.2 PG (ref 27–31)
MCHC RBC AUTO-ENTMCNC: 33.1 G/DL (ref 32–36)
MCV RBC AUTO: 91 FL (ref 82–98)
MONOCYTES # BLD AUTO: 0.6 K/UL (ref 0.3–1)
MONOCYTES NFR BLD: 9 % (ref 4–15)
NEUTROPHILS # BLD AUTO: 3.4 K/UL (ref 1.8–7.7)
NEUTROPHILS NFR BLD: 52.4 % (ref 38–73)
NRBC BLD-RTO: 0 /100 WBC
PLATELET # BLD AUTO: 239 K/UL (ref 150–350)
PMV BLD AUTO: 9.5 FL (ref 9.2–12.9)
POTASSIUM SERPL-SCNC: 4.1 MMOL/L (ref 3.5–5.1)
RBC # BLD AUTO: 4.2 M/UL (ref 4–5.4)
SODIUM SERPL-SCNC: 141 MMOL/L (ref 136–145)
WBC # BLD AUTO: 6.48 K/UL (ref 3.9–12.7)

## 2020-11-10 PROCEDURE — 80048 BASIC METABOLIC PNL TOTAL CA: CPT

## 2020-11-10 PROCEDURE — 85025 COMPLETE CBC W/AUTO DIFF WBC: CPT

## 2020-11-10 PROCEDURE — U0003 INFECTIOUS AGENT DETECTION BY NUCLEIC ACID (DNA OR RNA); SEVERE ACUTE RESPIRATORY SYNDROME CORONAVIRUS 2 (SARS-COV-2) (CORONAVIRUS DISEASE [COVID-19]), AMPLIFIED PROBE TECHNIQUE, MAKING USE OF HIGH THROUGHPUT TECHNOLOGIES AS DESCRIBED BY CMS-2020-01-R: HCPCS

## 2020-11-10 PROCEDURE — 84702 CHORIONIC GONADOTROPIN TEST: CPT

## 2020-11-10 RX ORDER — MIRTAZAPINE 7.5 MG/1
7.5 TABLET, FILM COATED ORAL NIGHTLY
COMMUNITY
End: 2021-01-08

## 2020-11-10 NOTE — H&P
Preoperative History and Physical                                                             Hospital Medicine      Chief Complaint: Preoperative evaluation     History of Present Illness:      Shruthi Riley is a 30 y.o. female who presents to the office today for a preoperative consultation at the request of Dr. Bueno who plans on performing Laparoscopic Salpingectomy on 11/13/20    Functional Status:      The patient is able to climb a flight of stairs. The patient is able to ambulate over 3 blocks without difficulty. The patient's functional status is not affected by the surgical problem. The patient's functional status is not affected by shortness of breath, chest pain, dyspnea on exertion and fatigue.    MET score greater than 4    Past Medical History:      Past Medical History:   Diagnosis Date    Depression     Hepatitis C     s/p Mavyret    IV drug abuse     Quit 3 years ago         Past Surgical History:      Past Surgical History:   Procedure Laterality Date    DILATION AND CURETTAGE OF UTERUS      TONSILLECTOMY          Social History:      Social History     Socioeconomic History    Marital status:      Spouse name: Not on file    Number of children: Not on file    Years of education: Not on file    Highest education level: Not on file   Occupational History    Not on file   Social Needs    Financial resource strain: Not on file    Food insecurity     Worry: Not on file     Inability: Not on file    Transportation needs     Medical: Not on file     Non-medical: Not on file   Tobacco Use    Smoking status: Current Every Day Smoker     Packs/day: 0.50     Years: 15.00     Pack years: 7.50     Types: Vaping with nicotine    Smokeless tobacco: Never Used   Substance and Sexual Activity    Alcohol use: No    Drug use: Not Currently     Comment: Hx IVDA- sober x 3 years     Sexual activity: Yes     Partners: Male     Birth  control/protection: Injection     Comment: depo provera   Lifestyle    Physical activity     Days per week: Not on file     Minutes per session: Not on file    Stress: Not on file   Relationships    Social connections     Talks on phone: Not on file     Gets together: Not on file     Attends Congregation service: Not on file     Active member of club or organization: Not on file     Attends meetings of clubs or organizations: Not on file     Relationship status: Not on file   Other Topics Concern    Not on file   Social History Narrative    Not on file        Family History:      Family History   Problem Relation Age of Onset    Heart failure Mother     Melanoma Mother     COPD Father     No Known Problems Brother     Breast cancer Neg Hx     Colon cancer Neg Hx     Ovarian cancer Neg Hx     Thrombosis Neg Hx        Allergies:      Review of patient's allergies indicates:  No Known Allergies    Medications:      Current Outpatient Medications   Medication Sig    buPROPion (WELLBUTRIN) 100 MG tablet Take 100 mg by mouth 2 (two) times daily.     LORazepam (ATIVAN) 2 MG Tab Take 0.5 mg by mouth every 6 (six) hours as needed.    medroxyPROGESTERone (DEPO-PROVERA) 150 mg/mL injection Inject 150 mg into the muscle every 3 (three) months. Given after delivery at [facility]    mirtazapine (REMERON) 7.5 MG Tab Take 7.5 mg by mouth every evening.    norethindrone-ethinyl estradiol (JUNEL FE 1/20) 1 mg-20 mcg (21)/75 mg (7) per tablet Take 1 tablet by mouth once daily.    OXcarbazepine (TRILEPTAL) 150 MG Tab Take 300 mg by mouth 2 (two) times daily.     valACYclovir (VALTREX) 1000 MG tablet Take 1,000 mg by mouth 2 (two) times daily as needed.      No current facility-administered medications for this encounter.        Vitals:      Vitals:    11/10/20 1430   BP: 123/78   Pulse: 70   Resp: 16   Temp: 98.1 °F (36.7 °C)       Review of Systems:        Constitutional: Negative for fever, chills, weight loss,  malaise/fatigue and diaphoresis.   HENT: Negative for hearing loss, ear pain, nosebleeds, congestion, sore throat, neck pain, tinnitus and ear discharge.    Eyes: Negative for blurred vision, double vision, photophobia, pain, discharge and redness.   Respiratory: Negative for cough, hemoptysis, sputum production, shortness of breath, wheezing and stridor.    Cardiovascular: Negative for chest pain, palpitations, orthopnea, claudication, leg swelling and PND.   Gastrointestinal: Negative for heartburn, nausea, vomiting, abdominal pain, diarrhea, constipation, blood in stool and melena.   Genitourinary: Negative for dysuria, urgency, frequency, hematuria and flank pain.   Musculoskeletal: Negative for myalgias, back pain, joint pain and falls.   Skin: Negative for itching and rash.   Neurological: Negative for dizziness, tingling, tremors, sensory change, speech change, focal weakness, seizures, loss of consciousness, weakness and headaches.   Endo/Heme/Allergies: Negative for environmental allergies and polydipsia. Does not bruise/bleed easily.   Psychiatric/Behavioral: Negative for depression, suicidal ideas, hallucinations, memory loss and substance abuse. The patient is not nervous/anxious and does not have insomnia.    All 14 systems reviewed and negative except as noted above.    Physical Exam:      Constitutional: Appears well-developed, well-nourished and in no acute distress.  Patient is oriented to person, place, and time.   Head: Normocephalic and atraumatic. Mucous membranes moist.  Neck: Neck supple no mass.   Cardiovascular: Normal rate and regular rhythm.  S1 S2 appreciated by ascultation.  Pulmonary/Chest: Effort normal and clear to auscultation bilaterally. No respiratory distress.   Abdomen: Soft. Non-tender and non-distended. Bowel sounds are normal.   Neurological: Patient is alert and oriented to person, place and time. Moves all extremities.  Skin: Warm and dry. No lesions.  Extremities: No  clubbing, cyanosis or edema.    Laboratory data:      Reviewed and noted in plan where applicable. Please see chart for full laboratory data.    No results for input(s): CPK, CPKMB, TROPONINI, MB in the last 24 hours. No results for input(s): POCTGLUCOSE in the last 24 hours.     No results found for: INR, PROTIME    Lab Results   Component Value Date    WBC 6.48 11/10/2020    HGB 12.7 11/10/2020    HCT 38.4 11/10/2020    MCV 91 11/10/2020     11/10/2020       No results for input(s): GLU, NA, K, CL, CO2, BUN, CREATININE, CALCIUM, MG in the last 24 hours.    Predictors of intubation difficulty:       Morbid obesity? no   Anatomically abnormal facies? no   Prominent incisors? no   Receding mandible? no   Short, thick neck? no   Neck range of motion: normal   Dentition: No chipped, loose, or missing teeth.    Cardiographics:    Not required    Imaging:      Chest x-ray: not required    Assessment and Plan:      Request for sterilization  The patient is scheduled for Laparoscopic Salpingectomy on 11/13/20 by Dr. Bueno     Known risk factors for perioperative complications:     Anxiety/ Depression      Difficulty with intubation is not anticipated.    Cardiac Risk Estimation: low intraopertive cardiac risk     1.) Preoperative workup as follows: hemoglobin, hematocrit, electrolytes, creatinine, glucose.  2.) Change in medication regimen before surgery:none   3.) Prophylaxis for cardiac events with perioperative beta-blockers: not indicated.  4.) Invasive hemodynamic monitoring perioperatively: not indicated.  5.) Deep vein thrombosis prophylaxis postoperatively: regimen to be chosen by surgical team.  6.) Surveillance for postoperative MI with ECG immediately postoperatively and on postoperati ve days 1 and 2 AND troponin levels 24 hours postoperatively and on day 4 or hospital discharge (whichever comes first): not indicated.  7.) Current medications which may produce withdrawal symptoms if withheld  perioperatively: none   8.) Other measures: None     History of postpartum psychosis  Recent inpatient psychiatric stay for PP Depression  Denies SI, HI  Pt reports symptoms are very stable now   Cont Wellbutrin daily, Remeron nightly, Trileptal BID, and Ativan prn       History of substance abuse  Hx of IVDA  Quit 3 years ago-no use since then     Chronic hepatitis C without hepatic coma  s/p treatment with Mavyret  Also has liver lesion   Followed by Dr. Vernon

## 2020-11-10 NOTE — DISCHARGE INSTRUCTIONS
To confirm, Your doctor has instructed you that surgery is scheduled for 11/13/20.       Please report to [facility].  Pre admit office will call afternoon prior to surgery with final arrival time    INSTRUCTIONS IMPORTANT!!!   Do not eat, drink, or smoke after 12 midnight-including water. OK to brush teeth, no gum, candy or mints!    ¨ Take only these medicines with a small swallow of water-morning of surgery.  Bupropion, Trileptal, Ativan    Pre operative instructions:  Please review the Pre-Operative Instruction booklet that you were given.        Bathing Instructions--See page 6 in the Pre-operative booklet.      Prevention of surgical site infections:     -Keep incisions clean and dry.   -Do not soak/submerge incisions in water until completely healed.   -Do not apply lotions, powders, creams, or deodorants to site.   -Always make sure hands are cleaned with antibacterial soap/ alcohol-based  prior to touching the surgical site.  (This includes doctors, nurses, staff, and yourself.)    Signs and symptoms:   -Redness and pain around the area where you had surgery   -Drainage of cloudy fluid from your surgical wound   -Fever over 100.4       I have read or had read and explained to me, and understand the above information.  Additional comments or instructions:  Received a copy of Pre-operative instructions booklet, FAQ surgical site infection sheet, and packets of hibiclens (if indicated).

## 2020-11-10 NOTE — ASSESSMENT & PLAN NOTE
Recent inpatient psychiatric stay for PP Depression  Denies SI, HI  Pt reports symptoms are very stable now   Cont Wellbutrin daily, Remeron nightly, Trileptal BID, and Ativan prn

## 2020-11-10 NOTE — ASSESSMENT & PLAN NOTE
The patient is scheduled for Laparoscopic Salpingectomy on 11/13/20 by Dr. Bueno     Known risk factors for perioperative complications:     Anxiety/ Depression      Difficulty with intubation is not anticipated.    Cardiac Risk Estimation: low intraopertive cardiac risk     1.) Preoperative workup as follows: hemoglobin, hematocrit, electrolytes, creatinine, glucose.  2.) Change in medication regimen before surgery:none   3.) Prophylaxis for cardiac events with perioperative beta-blockers: not indicated.  4.) Invasive hemodynamic monitoring perioperatively: not indicated.  5.) Deep vein thrombosis prophylaxis postoperatively: regimen to be chosen by surgical team.  6.) Surveillance for postoperative MI with ECG immediately postoperatively and on postoperati ve days 1 and 2 AND troponin levels 24 hours postoperatively and on day 4 or hospital discharge (whichever comes first): not indicated.  7.) Current medications which may produce withdrawal symptoms if withheld perioperatively: none   8.) Other measures: None

## 2020-11-11 LAB — SARS-COV-2 RNA RESP QL NAA+PROBE: NOT DETECTED

## 2020-11-12 ENCOUNTER — TELEPHONE (OUTPATIENT)
Dept: OBSTETRICS AND GYNECOLOGY | Facility: CLINIC | Age: 30
End: 2020-11-12

## 2020-11-12 ENCOUNTER — TELEPHONE (OUTPATIENT)
Dept: PREADMISSION TESTING | Facility: HOSPITAL | Age: 30
End: 2020-11-12

## 2020-11-12 ENCOUNTER — ANESTHESIA EVENT (OUTPATIENT)
Dept: SURGERY | Facility: HOSPITAL | Age: 30
End: 2020-11-12
Payer: MEDICAID

## 2020-11-12 RX ORDER — ACETAMINOPHEN 500 MG
1000 TABLET ORAL
Status: CANCELLED | OUTPATIENT
Start: 2020-11-12

## 2020-11-12 NOTE — TELEPHONE ENCOUNTER
----- Message from Kayleigh Toure sent at 11/12/2020  2:33 PM CST -----  Contact: pt  Pt requesting a call back regarding procedure. Please call pt back at [phone]

## 2020-11-12 NOTE — TELEPHONE ENCOUNTER
LVM with pt. Spoke with pts  Arrival time 0700 @ [facility]. NPO status reinforced. Medications reviewed. Visitor policy discussed.

## 2020-11-12 NOTE — ANESTHESIA PREPROCEDURE EVALUATION
11/12/2020  Shruthi Riley is a 30 y.o., female.    Anesthesia Evaluation    I have reviewed the Patient Summary Reports.    I have reviewed the Nursing Notes. I have reviewed the NPO Status.   I have reviewed the Medications.     Review of Systems  Anesthesia Hx:  No problems with previous Anesthesia  Denies Family Hx of Anesthesia complications.   Denies Personal Hx of Anesthesia complications.   Social:  Smoker H/O IV drug abuse, none x 3 yrs.    Hematology/Oncology:  Hematology Normal        Cardiovascular:  Cardiovascular Normal  ECG has been reviewed.    Pulmonary:  Pulmonary Normal    Renal/:  Renal/ Normal     Hepatic/GI:   Hepatitis, C Chronic Hep C, treated.   Neurological:  Neurology Normal    Psych:   depression          Physical Exam  General:  Well nourished    Airway/Jaw/Neck:  Airway Findings: Mouth Opening: Normal Tongue: Normal  General Airway Assessment: Adult  TM Distance: Normal, at least 6 cm  Jaw/Neck Findings:  Mandibular Fracture  Limited Ability to Prognath  Neck ROM: Normal ROM      Dental:  Dental Findings: In tact, Loose tooth    Chest/Lungs:  Chest/Lungs Findings: Clear to auscultation     Heart/Vascular:  Heart Findings: Rate: Normal  Rhythm: Regular Rhythm     Abdomen:  Abdomen Findings:  Normal     Musculoskeletal:  Musculoskeletal Findings: Normal   Skin:  Skin Findings: Normal         Anesthesia Plan  Type of Anesthesia, risks & benefits discussed:  Anesthesia Type:  general  Patient's Preference:   Intra-op Monitoring Plan: standard ASA monitors  Intra-op Monitoring Plan Comments:   Post Op Pain Control Plan: per primary service following discharge from PACU  Post Op Pain Control Plan Comments:   Induction:   IV  Beta Blocker:  Patient is not currently on a Beta-Blocker (No further documentation required).       Informed Consent: Patient understands risks and  agrees with Anesthesia plan.  Questions answered. Anesthesia consent signed with patient.  ASA Score: 2     Day of Surgery Review of History & Physical: I have interviewed and examined the patient. I have reviewed the patient's H&P dated:  There are no significant changes.  H&P update referred to the surgeon.         Ready For Surgery From Anesthesia Perspective.

## 2020-11-12 NOTE — TELEPHONE ENCOUNTER
Called patient and she has tubal tomorrow with RD and was supposed to be called today about arrival time, etc.  Patient's surgery at 10:30, arrive no later than 9:00.  NPO at midnight.

## 2020-11-13 ENCOUNTER — HOSPITAL ENCOUNTER (OUTPATIENT)
Facility: HOSPITAL | Age: 30
Discharge: HOME | End: 2020-11-13
Attending: OBSTETRICS & GYNECOLOGY | Admitting: OBSTETRICS & GYNECOLOGY
Payer: MEDICAID

## 2020-11-13 ENCOUNTER — ANESTHESIA (OUTPATIENT)
Dept: SURGERY | Facility: HOSPITAL | Age: 30
End: 2020-11-13
Payer: MEDICAID

## 2020-11-13 VITALS
RESPIRATION RATE: 17 BRPM | HEIGHT: 69 IN | SYSTOLIC BLOOD PRESSURE: 108 MMHG | DIASTOLIC BLOOD PRESSURE: 76 MMHG | HEART RATE: 62 BPM | OXYGEN SATURATION: 99 % | TEMPERATURE: 98 F | WEIGHT: 183.63 LBS | BODY MASS INDEX: 27.2 KG/M2

## 2020-11-13 DIAGNOSIS — Z98.890 STATUS POST LAPAROSCOPY: Primary | ICD-10-CM

## 2020-11-13 PROBLEM — Z30.2 REQUEST FOR STERILIZATION: Status: RESOLVED | Noted: 2020-11-10 | Resolved: 2020-11-13

## 2020-11-13 LAB
B-HCG UR QL: NEGATIVE
CTP QC/QA: YES

## 2020-11-13 PROCEDURE — 36000708 HC OR TIME LEV III 1ST 15 MIN: Performed by: OBSTETRICS & GYNECOLOGY

## 2020-11-13 PROCEDURE — 71000015 HC POSTOP RECOV 1ST HR: Performed by: OBSTETRICS & GYNECOLOGY

## 2020-11-13 PROCEDURE — D9220A PRA ANESTHESIA: ICD-10-PCS | Mod: CRNA,,, | Performed by: NURSE ANESTHETIST, CERTIFIED REGISTERED

## 2020-11-13 PROCEDURE — 58661 PR LAP,RMV  ADNEXAL STRUCTURE: ICD-10-PCS | Mod: 50,,, | Performed by: OBSTETRICS & GYNECOLOGY

## 2020-11-13 PROCEDURE — 88302 TISSUE EXAM BY PATHOLOGIST: CPT | Mod: 26,,, | Performed by: PATHOLOGY

## 2020-11-13 PROCEDURE — D9220A PRA ANESTHESIA: Mod: ANES,,, | Performed by: ANESTHESIOLOGY

## 2020-11-13 PROCEDURE — 63600175 PHARM REV CODE 636 W HCPCS: Performed by: NURSE ANESTHETIST, CERTIFIED REGISTERED

## 2020-11-13 PROCEDURE — D9220A PRA ANESTHESIA: ICD-10-PCS | Mod: ANES,,, | Performed by: ANESTHESIOLOGY

## 2020-11-13 PROCEDURE — 88302 PR  SURG PATH,LEVEL II: ICD-10-PCS | Mod: 26,,, | Performed by: PATHOLOGY

## 2020-11-13 PROCEDURE — 63600175 PHARM REV CODE 636 W HCPCS: Performed by: ANESTHESIOLOGY

## 2020-11-13 PROCEDURE — 36000709 HC OR TIME LEV III EA ADD 15 MIN: Performed by: OBSTETRICS & GYNECOLOGY

## 2020-11-13 PROCEDURE — 37000008 HC ANESTHESIA 1ST 15 MINUTES: Performed by: OBSTETRICS & GYNECOLOGY

## 2020-11-13 PROCEDURE — 25000003 PHARM REV CODE 250: Performed by: NURSE ANESTHETIST, CERTIFIED REGISTERED

## 2020-11-13 PROCEDURE — 00840 ANES IPER PX LOWER ABD NOS: CPT | Performed by: OBSTETRICS & GYNECOLOGY

## 2020-11-13 PROCEDURE — 27201423 OPTIME MED/SURG SUP & DEVICES STERILE SUPPLY: Performed by: OBSTETRICS & GYNECOLOGY

## 2020-11-13 PROCEDURE — 25000003 PHARM REV CODE 250: Performed by: ANESTHESIOLOGY

## 2020-11-13 PROCEDURE — D9220A PRA ANESTHESIA: Mod: CRNA,,, | Performed by: NURSE ANESTHETIST, CERTIFIED REGISTERED

## 2020-11-13 PROCEDURE — 81025 URINE PREGNANCY TEST: CPT | Performed by: OBSTETRICS & GYNECOLOGY

## 2020-11-13 PROCEDURE — 88302 TISSUE EXAM BY PATHOLOGIST: CPT | Performed by: PATHOLOGY

## 2020-11-13 PROCEDURE — 58661 LAPAROSCOPY REMOVE ADNEXA: CPT | Mod: 50,,, | Performed by: OBSTETRICS & GYNECOLOGY

## 2020-11-13 PROCEDURE — 71000033 HC RECOVERY, INTIAL HOUR: Performed by: OBSTETRICS & GYNECOLOGY

## 2020-11-13 PROCEDURE — 37000009 HC ANESTHESIA EA ADD 15 MINS: Performed by: OBSTETRICS & GYNECOLOGY

## 2020-11-13 RX ORDER — ALBUTEROL SULFATE 0.83 MG/ML
2.5 SOLUTION RESPIRATORY (INHALATION) EVERY 4 HOURS PRN
Status: DISCONTINUED | OUTPATIENT
Start: 2020-11-13 | End: 2020-11-13 | Stop reason: HOSPADM

## 2020-11-13 RX ORDER — DEXAMETHASONE SODIUM PHOSPHATE 4 MG/ML
INJECTION, SOLUTION INTRA-ARTICULAR; INTRALESIONAL; INTRAMUSCULAR; INTRAVENOUS; SOFT TISSUE
Status: DISCONTINUED | OUTPATIENT
Start: 2020-11-13 | End: 2020-11-13

## 2020-11-13 RX ORDER — DIPHENHYDRAMINE HYDROCHLORIDE 50 MG/ML
25 INJECTION INTRAMUSCULAR; INTRAVENOUS EVERY 6 HOURS PRN
Status: DISCONTINUED | OUTPATIENT
Start: 2020-11-13 | End: 2020-11-13 | Stop reason: HOSPADM

## 2020-11-13 RX ORDER — HYDROCODONE BITARTRATE AND ACETAMINOPHEN 5; 325 MG/1; MG/1
1 TABLET ORAL
Status: DISCONTINUED | OUTPATIENT
Start: 2020-11-13 | End: 2020-11-13 | Stop reason: HOSPADM

## 2020-11-13 RX ORDER — KETOROLAC TROMETHAMINE 30 MG/ML
INJECTION, SOLUTION INTRAMUSCULAR; INTRAVENOUS
Status: DISCONTINUED | OUTPATIENT
Start: 2020-11-13 | End: 2020-11-13

## 2020-11-13 RX ORDER — SODIUM CHLORIDE, SODIUM LACTATE, POTASSIUM CHLORIDE, CALCIUM CHLORIDE 600; 310; 30; 20 MG/100ML; MG/100ML; MG/100ML; MG/100ML
INJECTION, SOLUTION INTRAVENOUS CONTINUOUS
Status: DISCONTINUED | OUTPATIENT
Start: 2020-11-13 | End: 2020-11-13 | Stop reason: HOSPADM

## 2020-11-13 RX ORDER — MEPERIDINE HYDROCHLORIDE 25 MG/ML
12.5 INJECTION INTRAMUSCULAR; INTRAVENOUS; SUBCUTANEOUS ONCE
Status: DISCONTINUED | OUTPATIENT
Start: 2020-11-13 | End: 2020-11-13 | Stop reason: HOSPADM

## 2020-11-13 RX ORDER — MIDAZOLAM HYDROCHLORIDE 1 MG/ML
INJECTION, SOLUTION INTRAMUSCULAR; INTRAVENOUS
Status: DISCONTINUED | OUTPATIENT
Start: 2020-11-13 | End: 2020-11-13

## 2020-11-13 RX ORDER — KETOROLAC TROMETHAMINE 30 MG/ML
15 INJECTION, SOLUTION INTRAMUSCULAR; INTRAVENOUS EVERY 6 HOURS PRN
Status: DISCONTINUED | OUTPATIENT
Start: 2020-11-13 | End: 2020-11-13 | Stop reason: HOSPADM

## 2020-11-13 RX ORDER — ONDANSETRON 2 MG/ML
4 INJECTION INTRAMUSCULAR; INTRAVENOUS ONCE AS NEEDED
Status: DISCONTINUED | OUTPATIENT
Start: 2020-11-13 | End: 2020-11-13 | Stop reason: HOSPADM

## 2020-11-13 RX ORDER — NEOSTIGMINE METHYLSULFATE 1 MG/ML
INJECTION, SOLUTION INTRAVENOUS
Status: DISCONTINUED | OUTPATIENT
Start: 2020-11-13 | End: 2020-11-13

## 2020-11-13 RX ORDER — PROPOFOL 10 MG/ML
VIAL (ML) INTRAVENOUS
Status: DISCONTINUED | OUTPATIENT
Start: 2020-11-13 | End: 2020-11-13

## 2020-11-13 RX ORDER — FENTANYL CITRATE 50 UG/ML
INJECTION, SOLUTION INTRAMUSCULAR; INTRAVENOUS
Status: DISCONTINUED | OUTPATIENT
Start: 2020-11-13 | End: 2020-11-13

## 2020-11-13 RX ORDER — SUCCINYLCHOLINE CHLORIDE 20 MG/ML
INJECTION INTRAMUSCULAR; INTRAVENOUS
Status: DISCONTINUED | OUTPATIENT
Start: 2020-11-13 | End: 2020-11-13

## 2020-11-13 RX ORDER — ONDANSETRON 2 MG/ML
INJECTION INTRAMUSCULAR; INTRAVENOUS
Status: DISCONTINUED | OUTPATIENT
Start: 2020-11-13 | End: 2020-11-13

## 2020-11-13 RX ORDER — ONDANSETRON 8 MG/1
8 TABLET, ORALLY DISINTEGRATING ORAL EVERY 8 HOURS PRN
Status: DISCONTINUED | OUTPATIENT
Start: 2020-11-13 | End: 2020-11-13 | Stop reason: HOSPADM

## 2020-11-13 RX ORDER — ROCURONIUM BROMIDE 10 MG/ML
INJECTION, SOLUTION INTRAVENOUS
Status: DISCONTINUED | OUTPATIENT
Start: 2020-11-13 | End: 2020-11-13

## 2020-11-13 RX ORDER — FENTANYL CITRATE 50 UG/ML
25 INJECTION, SOLUTION INTRAMUSCULAR; INTRAVENOUS EVERY 5 MIN PRN
Status: DISCONTINUED | OUTPATIENT
Start: 2020-11-13 | End: 2020-11-13 | Stop reason: HOSPADM

## 2020-11-13 RX ORDER — HYDROCODONE BITARTRATE AND ACETAMINOPHEN 5; 325 MG/1; MG/1
1 TABLET ORAL EVERY 6 HOURS PRN
Qty: 12 TABLET | Refills: 0 | Status: SHIPPED | OUTPATIENT
Start: 2020-11-13 | End: 2020-11-20

## 2020-11-13 RX ORDER — LIDOCAINE HYDROCHLORIDE 20 MG/ML
INJECTION INTRAVENOUS
Status: DISCONTINUED | OUTPATIENT
Start: 2020-11-13 | End: 2020-11-13

## 2020-11-13 RX ADMIN — ROCURONIUM BROMIDE 5 MG: 10 INJECTION, SOLUTION INTRAVENOUS at 08:11

## 2020-11-13 RX ADMIN — ROCURONIUM BROMIDE 20 MG: 10 INJECTION, SOLUTION INTRAVENOUS at 09:11

## 2020-11-13 RX ADMIN — Medication 50 MG: at 08:11

## 2020-11-13 RX ADMIN — FENTANYL CITRATE 50 MCG: 50 INJECTION, SOLUTION INTRAMUSCULAR; INTRAVENOUS at 08:11

## 2020-11-13 RX ADMIN — PROPOFOL 50 MG: 10 INJECTION, EMULSION INTRAVENOUS at 08:11

## 2020-11-13 RX ADMIN — SUCCINYLCHOLINE CHLORIDE 120 MG: 20 INJECTION, SOLUTION INTRAMUSCULAR; INTRAVENOUS at 08:11

## 2020-11-13 RX ADMIN — MIDAZOLAM 2 MG: 1 INJECTION INTRAMUSCULAR; INTRAVENOUS at 08:11

## 2020-11-13 RX ADMIN — SODIUM CHLORIDE, SODIUM LACTATE, POTASSIUM CHLORIDE, AND CALCIUM CHLORIDE: 600; 310; 30; 20 INJECTION, SOLUTION INTRAVENOUS at 07:11

## 2020-11-13 RX ADMIN — KETOROLAC TROMETHAMINE 30 MG: 30 INJECTION, SOLUTION INTRAMUSCULAR at 09:11

## 2020-11-13 RX ADMIN — HYDROCODONE BITARTRATE AND ACETAMINOPHEN 1 TABLET: 5; 325 TABLET ORAL at 10:11

## 2020-11-13 RX ADMIN — ONDANSETRON 4 MG: 2 INJECTION, SOLUTION INTRAMUSCULAR; INTRAVENOUS at 09:11

## 2020-11-13 RX ADMIN — DEXAMETHASONE SODIUM PHOSPHATE 8 MG: 4 INJECTION, SOLUTION INTRA-ARTICULAR; INTRALESIONAL; INTRAMUSCULAR; INTRAVENOUS; SOFT TISSUE at 08:11

## 2020-11-13 RX ADMIN — GLYCOPYRROLATE 0.2 MG: 0.2 INJECTION, SOLUTION INTRAMUSCULAR; INTRAVITREAL at 08:11

## 2020-11-13 RX ADMIN — FENTANYL CITRATE 25 MCG: 50 INJECTION, SOLUTION INTRAMUSCULAR; INTRAVENOUS at 09:11

## 2020-11-13 RX ADMIN — SODIUM CHLORIDE, SODIUM LACTATE, POTASSIUM CHLORIDE, AND CALCIUM CHLORIDE: 600; 310; 30; 20 INJECTION, SOLUTION INTRAVENOUS at 06:11

## 2020-11-13 RX ADMIN — NEOSTIGMINE METHYLSULFATE 4 MG: 1 INJECTION INTRAVENOUS at 09:11

## 2020-11-13 RX ADMIN — GLYCOPYRROLATE 0.6 MG: 0.2 INJECTION, SOLUTION INTRAMUSCULAR; INTRAVITREAL at 09:11

## 2020-11-13 RX ADMIN — PROPOFOL 100 MG: 10 INJECTION, EMULSION INTRAVENOUS at 08:11

## 2020-11-13 NOTE — ANESTHESIA POSTPROCEDURE EVALUATION
Anesthesia Post Evaluation    Patient: Shruthi Riley    Procedure(s) Performed: Procedure(s) (LRB):  SALPINGECTOMY, LAPAROSCOPIC (Bilateral)    Final Anesthesia Type: general    Patient location during evaluation: PACU  Patient participation: Yes- Able to Participate  Level of consciousness: awake and alert and oriented  Post-procedure vital signs: reviewed and stable  Pain management: adequate  Airway patency: patent    PONV status at discharge: No PONV  Anesthetic complications: no      Cardiovascular status: blood pressure returned to baseline, stable and hemodynamically stable  Respiratory status: unassisted  Hydration status: euvolemic  Follow-up not needed.          Vitals Value Taken Time   /76 11/13/20 1045   Temp 36.6 °C (97.8 °F) 11/13/20 0948   Pulse 62 11/13/20 1045   Resp 17 11/13/20 1045   SpO2 99 % 11/13/20 1045         Event Time   Out of Recovery 10:20:00         Pain/Joel Score: Pain Rating Prior to Med Admin: 6 (11/13/2020 10:07 AM)  Joel Score: 10 (11/13/2020 10:45 AM)

## 2020-11-13 NOTE — SUBJECTIVE & OBJECTIVE
OB History    Para Term  AB Living   5 5 3 2 0 4   SAB TAB Ectopic Multiple Live Births   0 0 0 0 4      # Outcome Date GA Lbr Kris/2nd Weight Sex Delivery Anes PTL Lv   5 Term 20 39w0d  4.111 kg (9 lb 1 oz) M Vag-Spont EPI  JEN   4 Term 18 40w3d  4.082 kg (9 lb) F Vag-Spont EPI N JEN      Name: JEY PARR      Apgar1: 8  Apgar5: 9   3  01/01/15 26w0d   U    FD      Birth Comments: IUFD unk etiology   2 Term 10/10/13 40w0d  1.814 kg (4 lb) M Vag-Spont EPI  JEN      Birth Comments: pt does not have custoday-live in [place]   1  02/21/10 32w0d  3.289 kg (7 lb 4 oz) M Vag-Spont EPI  JEN      Birth Comments: pt does not have custoday-live in [place]     Past Medical History:   Diagnosis Date    Depression     Hepatitis C     s/p Mavyret    IV drug abuse     Quit 3 years ago      Past Surgical History:   Procedure Laterality Date    DILATION AND CURETTAGE OF UTERUS      TONSILLECTOMY         PTA Medications   Medication Sig    buPROPion (WELLBUTRIN) 100 MG tablet Take 100 mg by mouth 2 (two) times daily.     LORazepam (ATIVAN) 2 MG Tab Take 0.5 mg by mouth every 6 (six) hours as needed.    medroxyPROGESTERone (DEPO-PROVERA) 150 mg/mL injection Inject 150 mg into the muscle every 3 (three) months. Given after delivery at [facility]    mirtazapine (REMERON) 7.5 MG Tab Take 7.5 mg by mouth every evening.    norethindrone-ethinyl estradiol (JUNEL ) 1 mg-20 mcg (21)/75 mg (7) per tablet Take 1 tablet by mouth once daily.    OXcarbazepine (TRILEPTAL) 150 MG Tab Take 300 mg by mouth 2 (two) times daily.     valACYclovir (VALTREX) 1000 MG tablet Take 1,000 mg by mouth 2 (two) times daily as needed.        Review of patient's allergies indicates:  No Known Allergies     Family History     Problem Relation (Age of Onset)    COPD Father    Heart failure Mother    Melanoma Mother    No Known Problems Brother        Tobacco Use    Smoking status: Current Every Day Smoker      Packs/day: 0.50     Years: 15.00     Pack years: 7.50     Types: Vaping with nicotine    Smokeless tobacco: Never Used   Substance and Sexual Activity    Alcohol use: No    Drug use: Not Currently     Comment: Hx IVDA- sober x 3 years     Sexual activity: Yes     Partners: Male     Birth control/protection: Injection     Comment: depo provera     Review of Systems   Constitutional: Negative for activity change, appetite change, chills, fatigue, fever and unexpected weight change.   HENT: Negative for mouth sores.    Eyes: Negative for visual disturbance.   Respiratory: Negative for cough and shortness of breath.    Cardiovascular: Negative for chest pain and palpitations.   Gastrointestinal: Negative for abdominal pain, bloating, blood in stool, constipation, diarrhea and nausea.   Genitourinary: Negative for decreased libido, dyspareunia, dysuria, frequency, genital sores, hematuria, pelvic pain, urgency, vaginal discharge, urinary incontinence, postcoital bleeding, postmenopausal bleeding and vaginal odor.   Musculoskeletal: Negative for back pain, joint swelling and myalgias.   Integumentary:  Negative for rash, breast mass, nipple discharge and breast skin changes.   Neurological: Negative for syncope, numbness and headaches.   Hematological: Negative for adenopathy. Does not bruise/bleed easily.   Psychiatric/Behavioral: Negative for depression and sleep disturbance. The patient is not nervous/anxious.    Breast: Negative for mass, mastodynia, nipple discharge and skin changes     Objective:     Vital Signs (Most Recent):  Temp: 97.9 °F (36.6 °C) (11/13/20 0654)  Pulse: 75 (11/13/20 0654)  Resp: 16 (11/13/20 0654)  BP: 119/75 (11/13/20 0654)  SpO2: 97 % (11/13/20 0654) Vital Signs (24h Range):  Temp:  [97.9 °F (36.6 °C)] 97.9 °F (36.6 °C)  Pulse:  [75] 75  Resp:  [16] 16  SpO2:  [97 %] 97 %  BP: (119)/(75) 119/75     Weight: 83.3 kg (183 lb 10.3 oz)  Body mass index is 27.12 kg/m².    Patient's last  menstrual period was 10/26/2020.    Physical Exam:   Constitutional: She is oriented to person, place, and time. She appears well-developed and well-nourished. No distress.    HENT:   Head: Normocephalic and atraumatic.   Right Ear: External ear normal.   Left Ear: External ear normal.   Nose: Nose normal.    Eyes: Pupils are equal, round, and reactive to light. Conjunctivae are normal.    Neck: Trachea normal, normal range of motion and full passive range of motion without pain. Neck supple. No JVD present. No thyroid mass and no thyromegaly present.    Cardiovascular: Normal rate and regular rhythm.     Pulmonary/Chest: Effort normal and breath sounds normal. No respiratory distress.        Abdominal: Soft. Normal appearance and bowel sounds are normal. She exhibits no distension and no mass. There is no abdominal tenderness. No hernia. Hernia confirmed negative in the ventral area, confirmed negative in the right inguinal area and confirmed negative in the left inguinal area.     Genitourinary:    Vagina, uterus and rectum normal.   Rectum:      No external hemorrhoid or abnormal anal tone.   There is no rash, tenderness or lesion on the right labia. There is no rash, tenderness or lesion on the left labia. Uterus is not deviated, not enlarged, not tender and not experiencing uterine prolapse. Cervix is normal. Right adnexum displays no mass, no tenderness and no fullness. Left adnexum displays no mass, no tenderness and no fullness. No tenderness, bleeding, rectocele, cystocele or unspecified prolapse of vaginal walls in the vagina.    No foreign body in the vagina.   Labial bartholins normal.Cervix exhibits no motion tenderness, no discharge and no friability. negative for vaginal discharge          Musculoskeletal: Normal range of motion.       Neurological: She is alert and oriented to person, place, and time. She has normal reflexes.    Skin: Skin is warm and dry. She is not diaphoretic.    Psychiatric: She  has a normal mood and affect. Her speech is normal and behavior is normal. Thought content normal.       Laboratory:  I have personallly reviewed all pertinent lab results from the last 24 hours.    Diagnostic Results:  Labs: Reviewed  normal

## 2020-11-13 NOTE — H&P
[facility]  Obstetrics & Gynecology  History & Physical    Patient Name: Shruthi Riley  MRN: [id number]  Admission Date: 2020  Primary Care Provider: Primary Doctor No    Subjective:     Chief Complaint/Reason for Admission: sterilization procedure     History of Present Illness:  Para 5,   On Depo Provera since last birth   Ready for permanent sterilization .  Discussed TL vs salpingectomy, agrees with salpingectomy   Reviewed risks and complications of surgery              OB History    Para Term  AB Living   5 5 3 2 0 4   SAB TAB Ectopic Multiple Live Births   0 0 0 0 4      # Outcome Date GA Lbr Kris/2nd Weight Sex Delivery Anes PTL Lv   5 Term 20 39w0d  4.111 kg (9 lb 1 oz) M Vag-Spont EPI  JEN   4 Term 18 40w3d  4.082 kg (9 lb) F Vag-Spont EPI N JEN      Name: JEY RILEY      Apgar1: 8  Apgar5: 9   3  01/01/15 26w0d   U    FD      Birth Comments: IUFD unk etiology   2 Term 10/10/13 40w0d  1.814 kg (4 lb) M Vag-Spont EPI  JEN      Birth Comments: pt does not have custoday-live in [place]   1  02/21/10 32w0d  3.289 kg (7 lb 4 oz) M Vag-Spont EPI  JEN      Birth Comments: pt does not have custoday-live in [place]     Past Medical History:   Diagnosis Date    Depression     Hepatitis C     s/p Mavyret    IV drug abuse     Quit 3 years ago      Past Surgical History:   Procedure Laterality Date    DILATION AND CURETTAGE OF UTERUS      TONSILLECTOMY         PTA Medications   Medication Sig    buPROPion (WELLBUTRIN) 100 MG tablet Take 100 mg by mouth 2 (two) times daily.     LORazepam (ATIVAN) 2 MG Tab Take 0.5 mg by mouth every 6 (six) hours as needed.    medroxyPROGESTERone (DEPO-PROVERA) 150 mg/mL injection Inject 150 mg into the muscle every 3 (three) months. Given after delivery at [facility] [place]    mirtazapine (REMERON) 7.5 MG Tab Take 7.5 mg by mouth every evening.    norethindrone-ethinyl estradiol (JUNEL FE 1/20) 1 mg-20 mcg (21)/75 mg  (7) per tablet Take 1 tablet by mouth once daily.    OXcarbazepine (TRILEPTAL) 150 MG Tab Take 300 mg by mouth 2 (two) times daily.     valACYclovir (VALTREX) 1000 MG tablet Take 1,000 mg by mouth 2 (two) times daily as needed.        Review of patient's allergies indicates:  No Known Allergies     Family History     Problem Relation (Age of Onset)    COPD Father    Heart failure Mother    Melanoma Mother    No Known Problems Brother        Tobacco Use    Smoking status: Current Every Day Smoker     Packs/day: 0.50     Years: 15.00     Pack years: 7.50     Types: Vaping with nicotine    Smokeless tobacco: Never Used   Substance and Sexual Activity    Alcohol use: No    Drug use: Not Currently     Comment: Hx IVDA- sober x 3 years     Sexual activity: Yes     Partners: Male     Birth control/protection: Injection     Comment: depo provera     Review of Systems   Constitutional: Negative for activity change, appetite change, chills, fatigue, fever and unexpected weight change.   HENT: Negative for mouth sores.    Eyes: Negative for visual disturbance.   Respiratory: Negative for cough and shortness of breath.    Cardiovascular: Negative for chest pain and palpitations.   Gastrointestinal: Negative for abdominal pain, bloating, blood in stool, constipation, diarrhea and nausea.   Genitourinary: Negative for decreased libido, dyspareunia, dysuria, frequency, genital sores, hematuria, pelvic pain, urgency, vaginal discharge, urinary incontinence, postcoital bleeding, postmenopausal bleeding and vaginal odor.   Musculoskeletal: Negative for back pain, joint swelling and myalgias.   Integumentary:  Negative for rash, breast mass, nipple discharge and breast skin changes.   Neurological: Negative for syncope, numbness and headaches.   Hematological: Negative for adenopathy. Does not bruise/bleed easily.   Psychiatric/Behavioral: Negative for depression and sleep disturbance. The patient is not nervous/anxious.     Breast: Negative for mass, mastodynia, nipple discharge and skin changes     Objective:     Vital Signs (Most Recent):  Temp: 97.9 °F (36.6 °C) (11/13/20 0654)  Pulse: 75 (11/13/20 0654)  Resp: 16 (11/13/20 0654)  BP: 119/75 (11/13/20 0654)  SpO2: 97 % (11/13/20 0654) Vital Signs (24h Range):  Temp:  [97.9 °F (36.6 °C)] 97.9 °F (36.6 °C)  Pulse:  [75] 75  Resp:  [16] 16  SpO2:  [97 %] 97 %  BP: (119)/(75) 119/75     Weight: 83.3 kg (183 lb 10.3 oz)  Body mass index is 27.12 kg/m².    Patient's last menstrual period was 10/26/2020.    Physical Exam:   Constitutional: She is oriented to person, place, and time. She appears well-developed and well-nourished. No distress.    HENT:   Head: Normocephalic and atraumatic.   Right Ear: External ear normal.   Left Ear: External ear normal.   Nose: Nose normal.    Eyes: Pupils are equal, round, and reactive to light. Conjunctivae are normal.    Neck: Trachea normal, normal range of motion and full passive range of motion without pain. Neck supple. No JVD present. No thyroid mass and no thyromegaly present.    Cardiovascular: Normal rate and regular rhythm.     Pulmonary/Chest: Effort normal and breath sounds normal. No respiratory distress.        Abdominal: Soft. Normal appearance and bowel sounds are normal. She exhibits no distension and no mass. There is no abdominal tenderness. No hernia. Hernia confirmed negative in the ventral area, confirmed negative in the right inguinal area and confirmed negative in the left inguinal area.     Genitourinary:    Vagina, uterus and rectum normal.   Rectum:      No external hemorrhoid or abnormal anal tone.   There is no rash, tenderness or lesion on the right labia. There is no rash, tenderness or lesion on the left labia. Uterus is not deviated, not enlarged, not tender and not experiencing uterine prolapse. Cervix is normal. Right adnexum displays no mass, no tenderness and no fullness. Left adnexum displays no mass, no tenderness  and no fullness. No tenderness, bleeding, rectocele, cystocele or unspecified prolapse of vaginal walls in the vagina.    No foreign body in the vagina.   Labial bartholins normal.Cervix exhibits no motion tenderness, no discharge and no friability. negative for vaginal discharge          Musculoskeletal: Normal range of motion.       Neurological: She is alert and oriented to person, place, and time. She has normal reflexes.    Skin: Skin is warm and dry. She is not diaphoretic.    Psychiatric: She has a normal mood and affect. Her speech is normal and behavior is normal. Thought content normal.       Laboratory:  I have personallly reviewed all pertinent lab results from the last 24 hours.    Diagnostic Results:  Labs: Reviewed  normal     Assessment/Plan:     Request for sterilization  Laparoscopic bilateral salpingectomy        F Jerome Bueno MD  Obstetrics & Gynecology  [facility]

## 2020-11-13 NOTE — OP NOTE
[facility]  Surgery Department  Operative Note    SUMMARY     Date of Procedure: 11/13/2020     Procedure: Procedure(s) (LRB):  SALPINGECTOMY, LAPAROSCOPIC (Bilateral)     Surgeon(s) and Role:     * ED Bueno MD - Primary    Assisting Surgeon:  Catherine PANDYA     Pre-Operative Diagnosis: Encounter for female sterilization procedure [Z30.2]    Post-Operative Diagnosis: Post-Op Diagnosis Codes:     * Encounter for female sterilization procedure [Z30.2]    Anesthesia: General    Technical Procedures Used: none    Description of the Findings of the Procedure:       FINDINGS:  Uterus, bilateral tubes and ovaries are normal appearance.  No adhesions or endometriosis noted.  No bowel or liver abnormalities.      SUMMARY:  Once under general anesthesia, the patient was placed in supine    lithotomy position with low Jalen stirrups.  Exam under anesthesia           revealed  a normal size uterus.                                                                     The cervix was visualized easily after prepping the  vagina with Betadine solution        The cervix was grasped after emptying the bladder with a straight catheter.       A single-tooth tenaculum was used to grasp the anterior lip.  The ZUMI    manipulator was placed after dilating the cervix to a #6 Hegar dilator.     The abdomen was prepped with chlohexidine solution.      Once this was accomplished, sterile drapes were applied.      Time-out taken with all members of the Operating Room team.     A Veress needle was placed through the umbilicus with elevation of the abdominal wall   with towel clamps.      The  Abdomen was insufflated with CO2 gas to 15 mmHg. A  5 mm bladeless          trocar was placed through the umbilicus                                                                      .  Under direct visualization, 8 mm bladeless trocar placed in the RLQ and 5 mm trochar placed in the LLQ     .  The right and left fallopian   tubes were visualized,        Using Harmonic Scapel, the mesosalpinx was taken down to the level of the cornu with hemostatic method.  Each fallopian tube removed through the 8mm trochar.      No bleeding noted from the adnexa.        Once this was accomplished, the air was let out of the abdominal cavity,     all the instruments and  the trocars were  removed      Subcuticular, 4-0 Vicryl  suture         was used to close the 8 mm trocar site suprapubically and       Dermabond placed in all sites.  The patient was awakened and taken to        Recovery in stable condition.  ZUMI manipulator was removed.     Significant Surgical Tasks Conducted by the Assistant(s), if Applicable: bedside assistance and trochar site closure     Complications: No    Estimated Blood Loss (EBL): 5cc  Implants: * No implants in log *    Specimens:      Bilateral fallopian tubes            Condition: Good    Disposition: PACU - hemodynamically stable.    Attestation: I was present and scrubbed for the entire procedure.

## 2020-11-13 NOTE — DISCHARGE SUMMARY
[facility]  Discharge Note  Short Stay    Procedure(s) (LRB):  SALPINGECTOMY, LAPAROSCOPIC (Bilateral)    OUTCOME: Patient tolerated treatment/procedure well without complication and is now ready for discharge.    DISPOSITION: Home or Self Care    FINAL DIAGNOSIS:  Request for sterilization    FOLLOWUP: In clinic  In 4 weeks     DISCHARGE INSTRUCTIONS:    Discharge Procedure Orders   Diet general     Call MD for:  temperature >100.4     Call MD for:  persistent nausea and vomiting     Call MD for:  severe uncontrolled pain     Call MD for:  difficulty breathing, headache or visual disturbances     Call MD for:  redness, tenderness, or signs of infection (pain, swelling, redness, odor or green/yellow discharge around incision site)     Call MD for:  hives     Call MD for:  persistent dizziness or light-headedness     Call MD for:  extreme fatigue     No dressing needed     Shower on day dressing removed (No bath)

## 2020-11-13 NOTE — HPI
Para 5,   On Depo Provera since last birth   Ready for permanent sterilization .  Discussed TL vs salpingectomy, agrees with salpingectomy   Reviewed risks and complications of surgery

## 2020-11-13 NOTE — TRANSFER OF CARE
"Anesthesia Transfer of Care Note    Patient: Shruthi Riley    Procedure(s) Performed: Procedure(s) (LRB):  SALPINGECTOMY, LAPAROSCOPIC (Bilateral)    Patient location: PACU    Anesthesia Type: general    Transport from OR: Transported from OR on room air with adequate spontaneous ventilation    Post pain: adequate analgesia    Post assessment: no apparent anesthetic complications and tolerated procedure well    Post vital signs: stable    Level of consciousness: awake, alert and oriented    Nausea/Vomiting: no nausea/vomiting    Complications: none    Transfer of care protocol was followed      Last vitals:   Visit Vitals  /75 (BP Location: Right arm, Patient Position: Sitting)   Pulse 75   Temp 36.6 °C (97.9 °F) (Temporal)   Resp 16   Ht 5' 9" (1.753 m)   Wt 83.3 kg (183 lb 10.3 oz)   LMP 10/26/2020   SpO2 97%   Breastfeeding No   BMI 27.12 kg/m²     "

## 2020-11-19 LAB
FINAL PATHOLOGIC DIAGNOSIS: NORMAL
GROSS: NORMAL
Lab: NORMAL

## 2020-11-23 ENCOUNTER — TELEPHONE (OUTPATIENT)
Dept: OBSTETRICS AND GYNECOLOGY | Facility: CLINIC | Age: 30
End: 2020-11-23

## 2020-11-23 NOTE — TELEPHONE ENCOUNTER
----- Message from Shahla Lai sent at 11/23/2020  2:18 PM CST -----  Regarding: Birth control  Name of Who is Calling: CHRISTEN PARR [[id number]]      What is the request in detail: Patient is requesting a call back she states she needs a refill on her birth control meds she states she needs it for the high estrogen not to prevent pregnancy       Can the clinic reply by [facility]: no      What Number to Call Back if not in [facility]: [phone]

## 2020-11-23 NOTE — TELEPHONE ENCOUNTER
----- Message from Shahla Lai sent at 11/23/2020  2:18 PM CST -----  Regarding: Birth control  Name of Who is Calling: CHRISTEN PARR [[id number]]      What is the request in detail: Patient is requesting a call back she states she needs a refill on her birth control meds she states she needs it for the high estrogen not to prevent pregnancy       Can the clinic reply by [facility]: no      What Number to Call Back if not in [facility]: [phone]

## 2020-11-23 NOTE — TELEPHONE ENCOUNTER
Patient states AL gave her B/C pills to help with PP and she needs refills.  I don't see a recent prescription in her chart.  Will send message to AL for advice.

## 2020-11-24 RX ORDER — NORETHINDRONE ACETATE AND ETHINYL ESTRADIOL 1MG-20(21)
1 KIT ORAL DAILY
Qty: 28 TABLET | Refills: 11 | Status: SHIPPED | OUTPATIENT
Start: 2020-11-24 | End: 2021-03-09

## 2020-12-07 ENCOUNTER — PATIENT MESSAGE (OUTPATIENT)
Dept: OBSTETRICS AND GYNECOLOGY | Facility: CLINIC | Age: 30
End: 2020-12-07

## 2020-12-07 RX ORDER — VALACYCLOVIR HYDROCHLORIDE 1 G/1
1000 TABLET, FILM COATED ORAL 2 TIMES DAILY PRN
Qty: 20 TABLET | Refills: 1 | Status: SHIPPED | OUTPATIENT
Start: 2020-12-07 | End: 2020-12-07 | Stop reason: SDUPTHER

## 2020-12-07 RX ORDER — VALACYCLOVIR HYDROCHLORIDE 1 G/1
1000 TABLET, FILM COATED ORAL 2 TIMES DAILY PRN
Qty: 20 TABLET | Refills: 1 | Status: SHIPPED | OUTPATIENT
Start: 2020-12-07 | End: 2022-03-25 | Stop reason: SDUPTHER

## 2021-01-05 ENCOUNTER — PATIENT MESSAGE (OUTPATIENT)
Dept: OBSTETRICS AND GYNECOLOGY | Facility: CLINIC | Age: 31
End: 2021-01-05

## 2021-01-05 ENCOUNTER — PATIENT MESSAGE (OUTPATIENT)
Dept: HEPATOLOGY | Facility: CLINIC | Age: 31
End: 2021-01-05

## 2021-01-05 DIAGNOSIS — K76.9 LIVER LESION: Primary | ICD-10-CM

## 2021-01-05 DIAGNOSIS — K76.9 LIVER DISEASE, UNSPECIFIED: ICD-10-CM

## 2021-01-07 ENCOUNTER — PATIENT MESSAGE (OUTPATIENT)
Dept: HEPATOLOGY | Facility: CLINIC | Age: 31
End: 2021-01-07

## 2021-01-08 ENCOUNTER — PATIENT MESSAGE (OUTPATIENT)
Dept: HEPATOLOGY | Facility: CLINIC | Age: 31
End: 2021-01-08

## 2021-01-08 ENCOUNTER — OFFICE VISIT (OUTPATIENT)
Dept: OBSTETRICS AND GYNECOLOGY | Facility: CLINIC | Age: 31
End: 2021-01-08
Payer: MEDICAID

## 2021-01-08 VITALS
DIASTOLIC BLOOD PRESSURE: 78 MMHG | SYSTOLIC BLOOD PRESSURE: 110 MMHG | WEIGHT: 176.56 LBS | HEIGHT: 69 IN | BODY MASS INDEX: 26.15 KG/M2

## 2021-01-08 DIAGNOSIS — B96.89 BV (BACTERIAL VAGINOSIS): Primary | ICD-10-CM

## 2021-01-08 DIAGNOSIS — N76.0 BV (BACTERIAL VAGINOSIS): Primary | ICD-10-CM

## 2021-01-08 PROCEDURE — 99024 POSTOP FOLLOW-UP VISIT: CPT | Mod: ,,, | Performed by: OBSTETRICS & GYNECOLOGY

## 2021-01-08 PROCEDURE — 99999 PR PBB SHADOW E&M-EST. PATIENT-LVL II: CPT | Mod: PBBFAC,,,

## 2021-01-08 PROCEDURE — 99212 OFFICE O/P EST SF 10 MIN: CPT | Mod: PBBFAC

## 2021-01-08 PROCEDURE — 99024 PR POST-OP FOLLOW-UP VISIT: ICD-10-PCS | Mod: ,,, | Performed by: OBSTETRICS & GYNECOLOGY

## 2021-01-08 PROCEDURE — 99999 PR PBB SHADOW E&M-EST. PATIENT-LVL II: ICD-10-PCS | Mod: PBBFAC,,,

## 2021-01-08 RX ORDER — VILAZODONE HYDROCHLORIDE 20 MG/1
TABLET ORAL
COMMUNITY
Start: 2020-12-30 | End: 2022-03-25 | Stop reason: SDUPTHER

## 2021-01-08 RX ORDER — METRONIDAZOLE 500 MG/1
500 TABLET ORAL EVERY 12 HOURS
Qty: 14 TABLET | Refills: 0 | Status: SHIPPED | OUTPATIENT
Start: 2021-01-08 | End: 2021-01-15

## 2021-03-08 ENCOUNTER — PATIENT MESSAGE (OUTPATIENT)
Dept: OBSTETRICS AND GYNECOLOGY | Facility: CLINIC | Age: 31
End: 2021-03-08

## 2021-03-09 ENCOUNTER — LAB VISIT (OUTPATIENT)
Dept: LAB | Facility: HOSPITAL | Age: 31
End: 2021-03-09
Attending: NURSE PRACTITIONER
Payer: MEDICAID

## 2021-03-09 ENCOUNTER — OFFICE VISIT (OUTPATIENT)
Dept: OBSTETRICS AND GYNECOLOGY | Facility: CLINIC | Age: 31
End: 2021-03-09
Payer: MEDICAID

## 2021-03-09 VITALS
SYSTOLIC BLOOD PRESSURE: 119 MMHG | BODY MASS INDEX: 26.38 KG/M2 | HEIGHT: 69 IN | WEIGHT: 178.13 LBS | DIASTOLIC BLOOD PRESSURE: 77 MMHG

## 2021-03-09 DIAGNOSIS — N93.9 ABNORMAL UTERINE BLEEDING (AUB): Primary | ICD-10-CM

## 2021-03-09 DIAGNOSIS — N93.9 ABNORMAL UTERINE BLEEDING (AUB): ICD-10-CM

## 2021-03-09 DIAGNOSIS — Z86.19 HISTORY OF HEPATITIS C: ICD-10-CM

## 2021-03-09 LAB
B-HCG UR QL: NEGATIVE
BASOPHILS # BLD AUTO: 0.04 K/UL (ref 0–0.2)
BASOPHILS NFR BLD: 0.6 % (ref 0–1.9)
CTP QC/QA: YES
DIFFERENTIAL METHOD: NORMAL
EOSINOPHIL # BLD AUTO: 0.1 K/UL (ref 0–0.5)
EOSINOPHIL NFR BLD: 1.3 % (ref 0–8)
ERYTHROCYTE [DISTWIDTH] IN BLOOD BY AUTOMATED COUNT: 12.7 % (ref 11.5–14.5)
HCT VFR BLD AUTO: 38.6 % (ref 37–48.5)
HGB BLD-MCNC: 12.6 G/DL (ref 12–16)
IMM GRANULOCYTES # BLD AUTO: 0.02 K/UL (ref 0–0.04)
IMM GRANULOCYTES NFR BLD AUTO: 0.3 % (ref 0–0.5)
LYMPHOCYTES # BLD AUTO: 2.8 K/UL (ref 1–4.8)
LYMPHOCYTES NFR BLD: 40.6 % (ref 18–48)
MCH RBC QN AUTO: 29.6 PG (ref 27–31)
MCHC RBC AUTO-ENTMCNC: 32.6 G/DL (ref 32–36)
MCV RBC AUTO: 91 FL (ref 82–98)
MONOCYTES # BLD AUTO: 0.6 K/UL (ref 0.3–1)
MONOCYTES NFR BLD: 7.9 % (ref 4–15)
NEUTROPHILS # BLD AUTO: 3.4 K/UL (ref 1.8–7.7)
NEUTROPHILS NFR BLD: 49.3 % (ref 38–73)
NRBC BLD-RTO: 0 /100 WBC
PLATELET # BLD AUTO: 300 K/UL (ref 150–350)
PMV BLD AUTO: 10.3 FL (ref 9.2–12.9)
RBC # BLD AUTO: 4.26 M/UL (ref 4–5.4)
WBC # BLD AUTO: 6.92 K/UL (ref 3.9–12.7)

## 2021-03-09 PROCEDURE — 87491 CHLMYD TRACH DNA AMP PROBE: CPT | Performed by: NURSE PRACTITIONER

## 2021-03-09 PROCEDURE — 84443 ASSAY THYROID STIM HORMONE: CPT | Performed by: NURSE PRACTITIONER

## 2021-03-09 PROCEDURE — 99999 PR PBB SHADOW E&M-EST. PATIENT-LVL III: ICD-10-PCS | Mod: PBBFAC,,, | Performed by: NURSE PRACTITIONER

## 2021-03-09 PROCEDURE — 81025 URINE PREGNANCY TEST: CPT | Mod: PBBFAC | Performed by: NURSE PRACTITIONER

## 2021-03-09 PROCEDURE — 99999 PR PBB SHADOW E&M-EST. PATIENT-LVL III: CPT | Mod: PBBFAC,,, | Performed by: NURSE PRACTITIONER

## 2021-03-09 PROCEDURE — 85025 COMPLETE CBC W/AUTO DIFF WBC: CPT | Performed by: NURSE PRACTITIONER

## 2021-03-09 PROCEDURE — 99213 PR OFFICE/OUTPT VISIT, EST, LEVL III, 20-29 MIN: ICD-10-PCS | Mod: S$PBB,,, | Performed by: NURSE PRACTITIONER

## 2021-03-09 PROCEDURE — 99213 OFFICE O/P EST LOW 20 MIN: CPT | Mod: S$PBB,,, | Performed by: NURSE PRACTITIONER

## 2021-03-09 PROCEDURE — 87522 HEPATITIS C REVRS TRNSCRPJ: CPT | Performed by: NURSE PRACTITIONER

## 2021-03-09 PROCEDURE — 99213 OFFICE O/P EST LOW 20 MIN: CPT | Mod: PBBFAC | Performed by: NURSE PRACTITIONER

## 2021-03-09 PROCEDURE — 87591 N.GONORRHOEAE DNA AMP PROB: CPT | Performed by: NURSE PRACTITIONER

## 2021-03-09 PROCEDURE — 36415 COLL VENOUS BLD VENIPUNCTURE: CPT | Performed by: NURSE PRACTITIONER

## 2021-03-09 RX ORDER — NORGESTIMATE AND ETHINYL ESTRADIOL 0.25-0.035
1 KIT ORAL DAILY
Qty: 28 TABLET | Refills: 11 | Status: SHIPPED | OUTPATIENT
Start: 2021-03-09 | End: 2021-03-25 | Stop reason: ALTCHOICE

## 2021-03-10 ENCOUNTER — PATIENT MESSAGE (OUTPATIENT)
Dept: OBSTETRICS AND GYNECOLOGY | Facility: CLINIC | Age: 31
End: 2021-03-10

## 2021-03-10 LAB
C TRACH DNA SPEC QL NAA+PROBE: NOT DETECTED
N GONORRHOEA DNA SPEC QL NAA+PROBE: NOT DETECTED
TSH SERPL DL<=0.005 MIU/L-ACNC: 1.04 UIU/ML (ref 0.4–4)

## 2021-03-23 ENCOUNTER — PATIENT MESSAGE (OUTPATIENT)
Dept: OBSTETRICS AND GYNECOLOGY | Facility: CLINIC | Age: 31
End: 2021-03-23

## 2021-03-25 ENCOUNTER — OFFICE VISIT (OUTPATIENT)
Dept: OBSTETRICS AND GYNECOLOGY | Facility: CLINIC | Age: 31
End: 2021-03-25
Payer: MEDICAID

## 2021-03-25 VITALS — HEIGHT: 69 IN | WEIGHT: 172 LBS | BODY MASS INDEX: 25.48 KG/M2

## 2021-03-25 DIAGNOSIS — N93.9 ABNORMAL UTERINE BLEEDING (AUB): Primary | ICD-10-CM

## 2021-03-25 PROCEDURE — 99999 PR PBB SHADOW E&M-EST. PATIENT-LVL III: CPT | Mod: PBBFAC,,, | Performed by: NURSE PRACTITIONER

## 2021-03-25 PROCEDURE — 99999 PR PBB SHADOW E&M-EST. PATIENT-LVL III: ICD-10-PCS | Mod: PBBFAC,,, | Performed by: NURSE PRACTITIONER

## 2021-03-25 PROCEDURE — 99212 OFFICE O/P EST SF 10 MIN: CPT | Mod: S$PBB,,, | Performed by: NURSE PRACTITIONER

## 2021-03-25 PROCEDURE — 99213 OFFICE O/P EST LOW 20 MIN: CPT | Mod: PBBFAC | Performed by: NURSE PRACTITIONER

## 2021-03-25 PROCEDURE — 99212 PR OFFICE/OUTPT VISIT, EST, LEVL II, 10-19 MIN: ICD-10-PCS | Mod: S$PBB,,, | Performed by: NURSE PRACTITIONER

## 2021-03-25 RX ORDER — MEDROXYPROGESTERONE ACETATE 10 MG/1
10 TABLET ORAL DAILY
Qty: 10 TABLET | Refills: 0 | Status: SHIPPED | OUTPATIENT
Start: 2021-03-25 | End: 2021-03-31

## 2021-03-25 RX ORDER — DEXTROAMPHETAMINE SACCHARATE, AMPHETAMINE ASPARTATE MONOHYDRATE, DEXTROAMPHETAMINE SULFATE AND AMPHETAMINE SULFATE 5; 5; 5; 5 MG/1; MG/1; MG/1; MG/1
20 CAPSULE, EXTENDED RELEASE ORAL EVERY MORNING
COMMUNITY
End: 2022-01-12

## 2021-03-25 RX ORDER — TEMAZEPAM 15 MG/1
CAPSULE ORAL
COMMUNITY
Start: 2021-02-22 | End: 2021-03-31

## 2021-03-30 ENCOUNTER — TELEPHONE (OUTPATIENT)
Dept: RADIOLOGY | Facility: HOSPITAL | Age: 31
End: 2021-03-30

## 2021-03-31 ENCOUNTER — HOSPITAL ENCOUNTER (EMERGENCY)
Facility: HOSPITAL | Age: 31
Discharge: HOME | End: 2021-03-31
Attending: EMERGENCY MEDICINE
Payer: MEDICAID

## 2021-03-31 ENCOUNTER — TELEPHONE (OUTPATIENT)
Dept: OBSTETRICS AND GYNECOLOGY | Facility: CLINIC | Age: 31
End: 2021-03-31

## 2021-03-31 ENCOUNTER — PROCEDURE VISIT (OUTPATIENT)
Dept: OBSTETRICS AND GYNECOLOGY | Facility: CLINIC | Age: 31
End: 2021-03-31
Payer: MEDICAID

## 2021-03-31 ENCOUNTER — HOSPITAL ENCOUNTER (OUTPATIENT)
Dept: RADIOLOGY | Facility: HOSPITAL | Age: 31
Discharge: HOME | End: 2021-03-31
Attending: NURSE PRACTITIONER
Payer: MEDICAID

## 2021-03-31 VITALS
RESPIRATION RATE: 20 BRPM | WEIGHT: 171.19 LBS | OXYGEN SATURATION: 95 % | SYSTOLIC BLOOD PRESSURE: 114 MMHG | BODY MASS INDEX: 24.51 KG/M2 | HEIGHT: 70 IN | TEMPERATURE: 99 F | HEART RATE: 74 BPM | DIASTOLIC BLOOD PRESSURE: 67 MMHG

## 2021-03-31 VITALS
HEIGHT: 69 IN | BODY MASS INDEX: 25.48 KG/M2 | WEIGHT: 172 LBS | SYSTOLIC BLOOD PRESSURE: 119 MMHG | DIASTOLIC BLOOD PRESSURE: 77 MMHG

## 2021-03-31 DIAGNOSIS — M54.31 BILATERAL SCIATICA: ICD-10-CM

## 2021-03-31 DIAGNOSIS — N93.9 ABNORMAL UTERINE BLEEDING (AUB): ICD-10-CM

## 2021-03-31 DIAGNOSIS — M53.3 SACRAL BACK PAIN: ICD-10-CM

## 2021-03-31 DIAGNOSIS — M54.32 BILATERAL SCIATICA: ICD-10-CM

## 2021-03-31 DIAGNOSIS — N93.9 ABNORMAL UTERINE BLEEDING (AUB): Primary | ICD-10-CM

## 2021-03-31 LAB
ALBUMIN SERPL BCP-MCNC: 4.2 G/DL (ref 3.5–5.2)
ALP SERPL-CCNC: 64 U/L (ref 55–135)
ALT SERPL W/O P-5'-P-CCNC: 14 U/L (ref 10–44)
ANION GAP SERPL CALC-SCNC: 8 MMOL/L (ref 8–16)
AST SERPL-CCNC: 14 U/L (ref 10–40)
B-HCG UR QL: NEGATIVE
BACTERIA #/AREA URNS HPF: ABNORMAL /HPF
BASOPHILS # BLD AUTO: 0.01 K/UL (ref 0–0.2)
BASOPHILS NFR BLD: 0.1 % (ref 0–1.9)
BILIRUB SERPL-MCNC: 0.6 MG/DL (ref 0.1–1)
BILIRUB UR QL STRIP: NEGATIVE
BUN SERPL-MCNC: 12 MG/DL (ref 6–20)
CALCIUM SERPL-MCNC: 8.6 MG/DL (ref 8.7–10.5)
CHLORIDE SERPL-SCNC: 105 MMOL/L (ref 95–110)
CLARITY UR: CLEAR
CO2 SERPL-SCNC: 24 MMOL/L (ref 23–29)
COLOR UR: YELLOW
CREAT SERPL-MCNC: 0.8 MG/DL (ref 0.5–1.4)
CTP QC/QA: YES
DIFFERENTIAL METHOD: ABNORMAL
EOSINOPHIL # BLD AUTO: 0.1 K/UL (ref 0–0.5)
EOSINOPHIL NFR BLD: 1.2 % (ref 0–8)
ERYTHROCYTE [DISTWIDTH] IN BLOOD BY AUTOMATED COUNT: 12.3 % (ref 11.5–14.5)
EST. GFR  (AFRICAN AMERICAN): >60 ML/MIN/1.73 M^2
EST. GFR  (NON AFRICAN AMERICAN): >60 ML/MIN/1.73 M^2
GLUCOSE SERPL-MCNC: 94 MG/DL (ref 70–110)
GLUCOSE UR QL STRIP: NEGATIVE
HCT VFR BLD AUTO: 42.4 % (ref 37–48.5)
HGB BLD-MCNC: 13.9 G/DL (ref 12–16)
HGB UR QL STRIP: ABNORMAL
IMM GRANULOCYTES # BLD AUTO: 0.02 K/UL (ref 0–0.04)
IMM GRANULOCYTES NFR BLD AUTO: 0.3 % (ref 0–0.5)
KETONES UR QL STRIP: NEGATIVE
LEUKOCYTE ESTERASE UR QL STRIP: NEGATIVE
LIPASE SERPL-CCNC: 9 U/L (ref 4–60)
LYMPHOCYTES # BLD AUTO: 1.2 K/UL (ref 1–4.8)
LYMPHOCYTES NFR BLD: 16.6 % (ref 18–48)
MCH RBC QN AUTO: 29.1 PG (ref 27–31)
MCHC RBC AUTO-ENTMCNC: 32.8 G/DL (ref 32–36)
MCV RBC AUTO: 89 FL (ref 82–98)
MICROSCOPIC COMMENT: ABNORMAL
MONOCYTES # BLD AUTO: 0.5 K/UL (ref 0.3–1)
MONOCYTES NFR BLD: 6.4 % (ref 4–15)
NEUTROPHILS # BLD AUTO: 5.6 K/UL (ref 1.8–7.7)
NEUTROPHILS NFR BLD: 75.4 % (ref 38–73)
NITRITE UR QL STRIP: NEGATIVE
NRBC BLD-RTO: 0 /100 WBC
PH UR STRIP: 6 [PH] (ref 5–8)
PLATELET # BLD AUTO: 276 K/UL (ref 150–450)
PMV BLD AUTO: 9.7 FL (ref 9.2–12.9)
POTASSIUM SERPL-SCNC: 3.4 MMOL/L (ref 3.5–5.1)
PROT SERPL-MCNC: 6.9 G/DL (ref 6–8.4)
PROT UR QL STRIP: NEGATIVE
RBC # BLD AUTO: 4.77 M/UL (ref 4–5.4)
RBC #/AREA URNS HPF: 10 /HPF (ref 0–4)
SODIUM SERPL-SCNC: 137 MMOL/L (ref 136–145)
SP GR UR STRIP: 1.01 (ref 1–1.03)
URN SPEC COLLECT METH UR: ABNORMAL
UROBILINOGEN UR STRIP-ACNC: NEGATIVE EU/DL
WBC # BLD AUTO: 7.45 K/UL (ref 3.9–12.7)

## 2021-03-31 PROCEDURE — 96374 THER/PROPH/DIAG INJ IV PUSH: CPT

## 2021-03-31 PROCEDURE — 81000 URINALYSIS NONAUTO W/SCOPE: CPT | Performed by: PHYSICIAN ASSISTANT

## 2021-03-31 PROCEDURE — 81025 URINE PREGNANCY TEST: CPT | Mod: PBBFAC | Performed by: NURSE PRACTITIONER

## 2021-03-31 PROCEDURE — 80053 COMPREHEN METABOLIC PANEL: CPT | Performed by: PHYSICIAN ASSISTANT

## 2021-03-31 PROCEDURE — 99284 EMERGENCY DEPT VISIT MOD MDM: CPT | Mod: 25

## 2021-03-31 PROCEDURE — 63600175 PHARM REV CODE 636 W HCPCS: Performed by: EMERGENCY MEDICINE

## 2021-03-31 PROCEDURE — 88305 TISSUE EXAM BY PATHOLOGIST: CPT | Mod: 26,,, | Performed by: PATHOLOGY

## 2021-03-31 PROCEDURE — 83690 ASSAY OF LIPASE: CPT | Performed by: PHYSICIAN ASSISTANT

## 2021-03-31 PROCEDURE — 85025 COMPLETE CBC W/AUTO DIFF WBC: CPT | Performed by: PHYSICIAN ASSISTANT

## 2021-03-31 PROCEDURE — 88305 TISSUE EXAM BY PATHOLOGIST: ICD-10-PCS | Mod: 26,,, | Performed by: PATHOLOGY

## 2021-03-31 PROCEDURE — 63600175 PHARM REV CODE 636 W HCPCS: Performed by: PHYSICIAN ASSISTANT

## 2021-03-31 PROCEDURE — 96361 HYDRATE IV INFUSION ADD-ON: CPT

## 2021-03-31 PROCEDURE — 58100 BIOPSY OF UTERUS LINING: CPT | Mod: S$PBB,,, | Performed by: NURSE PRACTITIONER

## 2021-03-31 PROCEDURE — 58100 BIOPSY (GYNECOLOGICAL): ICD-10-PCS | Mod: S$PBB,,, | Performed by: NURSE PRACTITIONER

## 2021-03-31 PROCEDURE — 88305 TISSUE EXAM BY PATHOLOGIST: CPT | Performed by: PATHOLOGY

## 2021-03-31 PROCEDURE — 76856 US EXAM PELVIC COMPLETE: CPT | Mod: TC

## 2021-03-31 PROCEDURE — 96375 TX/PRO/DX INJ NEW DRUG ADDON: CPT

## 2021-03-31 PROCEDURE — 76830 US PELVIS COMP WITH TRANSVAG NON-OB (XPD): ICD-10-PCS | Mod: 26,,, | Performed by: RADIOLOGY

## 2021-03-31 PROCEDURE — 58100 BIOPSY OF UTERUS LINING: CPT | Mod: PBBFAC | Performed by: NURSE PRACTITIONER

## 2021-03-31 PROCEDURE — 76856 US PELVIS COMP WITH TRANSVAG NON-OB (XPD): ICD-10-PCS | Mod: 26,,, | Performed by: RADIOLOGY

## 2021-03-31 PROCEDURE — 25000003 PHARM REV CODE 250: Performed by: PHYSICIAN ASSISTANT

## 2021-03-31 PROCEDURE — 76830 TRANSVAGINAL US NON-OB: CPT | Mod: 26,,, | Performed by: RADIOLOGY

## 2021-03-31 PROCEDURE — 76856 US EXAM PELVIC COMPLETE: CPT | Mod: 26,,, | Performed by: RADIOLOGY

## 2021-03-31 RX ORDER — PREDNISONE 20 MG/1
40 TABLET ORAL
Status: COMPLETED | OUTPATIENT
Start: 2021-03-31 | End: 2021-03-31

## 2021-03-31 RX ORDER — KETOROLAC TROMETHAMINE 30 MG/ML
15 INJECTION, SOLUTION INTRAMUSCULAR; INTRAVENOUS
Status: COMPLETED | OUTPATIENT
Start: 2021-03-31 | End: 2021-03-31

## 2021-03-31 RX ORDER — PREDNISONE 10 MG/1
40 TABLET ORAL DAILY
Qty: 20 TABLET | Refills: 0 | Status: SHIPPED | OUTPATIENT
Start: 2021-03-31 | End: 2021-04-05

## 2021-03-31 RX ORDER — ONDANSETRON 2 MG/ML
4 INJECTION INTRAMUSCULAR; INTRAVENOUS
Status: COMPLETED | OUTPATIENT
Start: 2021-03-31 | End: 2021-03-31

## 2021-03-31 RX ORDER — MORPHINE SULFATE 2 MG/ML
6 INJECTION, SOLUTION INTRAMUSCULAR; INTRAVENOUS
Status: DISCONTINUED | OUTPATIENT
Start: 2021-03-31 | End: 2021-03-31 | Stop reason: HOSPADM

## 2021-03-31 RX ORDER — ONDANSETRON 4 MG/1
4 TABLET, ORALLY DISINTEGRATING ORAL EVERY 8 HOURS PRN
Qty: 11 TABLET | Refills: 0 | Status: SHIPPED | OUTPATIENT
Start: 2021-03-31 | End: 2021-04-03

## 2021-03-31 RX ADMIN — SODIUM CHLORIDE 1000 ML: 0.9 INJECTION, SOLUTION INTRAVENOUS at 06:03

## 2021-03-31 RX ADMIN — KETOROLAC TROMETHAMINE 15 MG: 30 INJECTION, SOLUTION INTRAMUSCULAR; INTRAVENOUS at 07:03

## 2021-03-31 RX ADMIN — ONDANSETRON 4 MG: 2 INJECTION INTRAMUSCULAR; INTRAVENOUS at 06:03

## 2021-03-31 RX ADMIN — PREDNISONE 40 MG: 20 TABLET ORAL at 06:03

## 2021-04-03 ENCOUNTER — PATIENT MESSAGE (OUTPATIENT)
Dept: OBSTETRICS AND GYNECOLOGY | Facility: CLINIC | Age: 31
End: 2021-04-03

## 2021-04-05 ENCOUNTER — TELEPHONE (OUTPATIENT)
Dept: OBSTETRICS AND GYNECOLOGY | Facility: CLINIC | Age: 31
End: 2021-04-05

## 2021-04-06 LAB
FINAL PATHOLOGIC DIAGNOSIS: NORMAL
GROSS: NORMAL

## 2021-04-07 ENCOUNTER — PATIENT MESSAGE (OUTPATIENT)
Dept: OBSTETRICS AND GYNECOLOGY | Facility: CLINIC | Age: 31
End: 2021-04-07

## 2021-04-07 ENCOUNTER — TELEPHONE (OUTPATIENT)
Dept: OBSTETRICS AND GYNECOLOGY | Facility: CLINIC | Age: 31
End: 2021-04-07

## 2021-04-21 ENCOUNTER — TELEPHONE (OUTPATIENT)
Dept: OBSTETRICS AND GYNECOLOGY | Facility: CLINIC | Age: 31
End: 2021-04-21

## 2021-04-29 ENCOUNTER — PATIENT MESSAGE (OUTPATIENT)
Dept: RESEARCH | Facility: HOSPITAL | Age: 31
End: 2021-04-29

## 2021-11-04 NOTE — ASSESSMENT & PLAN NOTE
CBC on admit  
Followed by hepatology  
IOL per guidelines  
Pitocin induction  AROM  Epidural PRN  Close monitoring of maternal/fetal status  Anticipate normal vaginal delivery  
Routine pp care  Discharge home  
Taking Valtrex for suppression  
UDS negative 2 weeks ago  
Detail Level: Zone

## 2022-01-10 ENCOUNTER — PATIENT MESSAGE (OUTPATIENT)
Dept: HEPATOLOGY | Facility: CLINIC | Age: 32
End: 2022-01-10

## 2022-01-10 ENCOUNTER — LAB VISIT (OUTPATIENT)
Dept: LAB | Facility: HOSPITAL | Age: 32
End: 2022-01-10
Attending: INTERNAL MEDICINE
Payer: MEDICAID

## 2022-01-10 ENCOUNTER — OFFICE VISIT (OUTPATIENT)
Dept: HEPATOLOGY | Facility: CLINIC | Age: 32
End: 2022-01-10
Payer: MEDICAID

## 2022-01-10 VITALS
HEART RATE: 72 BPM | WEIGHT: 173.5 LBS | HEIGHT: 70 IN | SYSTOLIC BLOOD PRESSURE: 118 MMHG | BODY MASS INDEX: 24.84 KG/M2 | DIASTOLIC BLOOD PRESSURE: 84 MMHG

## 2022-01-10 DIAGNOSIS — K76.9 LIVER DISEASE, UNSPECIFIED: ICD-10-CM

## 2022-01-10 DIAGNOSIS — R93.5 ABNORMAL ULTRASOUND OF ABDOMEN: ICD-10-CM

## 2022-01-10 DIAGNOSIS — K76.9 LIVER LESION: ICD-10-CM

## 2022-01-10 DIAGNOSIS — K76.9 LIVER LESION: Primary | ICD-10-CM

## 2022-01-10 DIAGNOSIS — Z00.00 HEALTHCARE MAINTENANCE: ICD-10-CM

## 2022-01-10 LAB
AFP SERPL-MCNC: 2.8 NG/ML (ref 0–8.4)
ALBUMIN SERPL BCP-MCNC: 4.1 G/DL (ref 3.5–5.2)
ALP SERPL-CCNC: 59 U/L (ref 55–135)
ALT SERPL W/O P-5'-P-CCNC: 12 U/L (ref 10–44)
ANION GAP SERPL CALC-SCNC: 6 MMOL/L (ref 8–16)
AST SERPL-CCNC: 13 U/L (ref 10–40)
BILIRUB SERPL-MCNC: 0.4 MG/DL (ref 0.1–1)
BUN SERPL-MCNC: 8 MG/DL (ref 6–20)
CALCIUM SERPL-MCNC: 8.6 MG/DL (ref 8.7–10.5)
CHLORIDE SERPL-SCNC: 109 MMOL/L (ref 95–110)
CO2 SERPL-SCNC: 25 MMOL/L (ref 23–29)
CREAT SERPL-MCNC: 0.7 MG/DL (ref 0.5–1.4)
EST. GFR  (AFRICAN AMERICAN): >60 ML/MIN/1.73 M^2
EST. GFR  (NON AFRICAN AMERICAN): >60 ML/MIN/1.73 M^2
GLUCOSE SERPL-MCNC: 92 MG/DL (ref 70–110)
POTASSIUM SERPL-SCNC: 3.8 MMOL/L (ref 3.5–5.1)
PROT SERPL-MCNC: 6.5 G/DL (ref 6–8.4)
SODIUM SERPL-SCNC: 140 MMOL/L (ref 136–145)

## 2022-01-10 PROCEDURE — 1159F PR MEDICATION LIST DOCUMENTED IN MEDICAL RECORD: ICD-10-PCS | Mod: CPTII,,, | Performed by: INTERNAL MEDICINE

## 2022-01-10 PROCEDURE — 3008F BODY MASS INDEX DOCD: CPT | Mod: CPTII,,, | Performed by: INTERNAL MEDICINE

## 2022-01-10 PROCEDURE — 99999 PR PBB SHADOW E&M-EST. PATIENT-LVL III: ICD-10-PCS | Mod: PBBFAC,,, | Performed by: INTERNAL MEDICINE

## 2022-01-10 PROCEDURE — 1160F RVW MEDS BY RX/DR IN RCRD: CPT | Mod: CPTII,,, | Performed by: INTERNAL MEDICINE

## 2022-01-10 PROCEDURE — 3074F SYST BP LT 130 MM HG: CPT | Mod: CPTII,,, | Performed by: INTERNAL MEDICINE

## 2022-01-10 PROCEDURE — 1160F PR REVIEW ALL MEDS BY PRESCRIBER/CLIN PHARMACIST DOCUMENTED: ICD-10-PCS | Mod: CPTII,,, | Performed by: INTERNAL MEDICINE

## 2022-01-10 PROCEDURE — 99213 PR OFFICE/OUTPT VISIT, EST, LEVL III, 20-29 MIN: ICD-10-PCS | Mod: S$PBB,,, | Performed by: INTERNAL MEDICINE

## 2022-01-10 PROCEDURE — 99999 PR PBB SHADOW E&M-EST. PATIENT-LVL III: CPT | Mod: PBBFAC,,, | Performed by: INTERNAL MEDICINE

## 2022-01-10 PROCEDURE — 99213 OFFICE O/P EST LOW 20 MIN: CPT | Mod: PBBFAC | Performed by: INTERNAL MEDICINE

## 2022-01-10 PROCEDURE — 1159F MED LIST DOCD IN RCRD: CPT | Mod: CPTII,,, | Performed by: INTERNAL MEDICINE

## 2022-01-10 PROCEDURE — 3079F DIAST BP 80-89 MM HG: CPT | Mod: CPTII,,, | Performed by: INTERNAL MEDICINE

## 2022-01-10 PROCEDURE — 36415 COLL VENOUS BLD VENIPUNCTURE: CPT | Performed by: INTERNAL MEDICINE

## 2022-01-10 PROCEDURE — 80053 COMPREHEN METABOLIC PANEL: CPT | Performed by: INTERNAL MEDICINE

## 2022-01-10 PROCEDURE — 3079F PR MOST RECENT DIASTOLIC BLOOD PRESSURE 80-89 MM HG: ICD-10-PCS | Mod: CPTII,,, | Performed by: INTERNAL MEDICINE

## 2022-01-10 PROCEDURE — 99213 OFFICE O/P EST LOW 20 MIN: CPT | Mod: S$PBB,,, | Performed by: INTERNAL MEDICINE

## 2022-01-10 PROCEDURE — 82105 ALPHA-FETOPROTEIN SERUM: CPT | Performed by: INTERNAL MEDICINE

## 2022-01-10 PROCEDURE — 3074F PR MOST RECENT SYSTOLIC BLOOD PRESSURE < 130 MM HG: ICD-10-PCS | Mod: CPTII,,, | Performed by: INTERNAL MEDICINE

## 2022-01-10 PROCEDURE — 3008F PR BODY MASS INDEX (BMI) DOCUMENTED: ICD-10-PCS | Mod: CPTII,,, | Performed by: INTERNAL MEDICINE

## 2022-01-10 RX ORDER — DEXTROAMPHETAMINE SACCHARATE, AMPHETAMINE ASPARTATE MONOHYDRATE, DEXTROAMPHETAMINE SULFATE AND AMPHETAMINE SULFATE 3.75; 3.75; 3.75; 3.75 MG/1; MG/1; MG/1; MG/1
15 CAPSULE, EXTENDED RELEASE ORAL EVERY MORNING
COMMUNITY
End: 2022-03-25 | Stop reason: SDUPTHER

## 2022-01-12 NOTE — PROGRESS NOTES
Subjective:     Shruthi Riley is here for follow up of Liver lesion      HPI  Since Shruthi Riley's last visit she had another child. She has not followed up. She is here to f/u on a liver lesions. She has some nonspecific complaints and wants to get a PCP.    No evidence of liver decompensation: no ascites, confusion or GI bleeding.          Review of Systems    Objective:     Physical Exam  Vitals reviewed.   Constitutional:       General: She is not in acute distress.     Appearance: She is well-developed and well-nourished.   HENT:      Head: Normocephalic and atraumatic.      Mouth/Throat:      Mouth: Oropharynx is clear and moist.      Pharynx: No oropharyngeal exudate.   Eyes:      General: No scleral icterus.        Right eye: No discharge.         Left eye: No discharge.      Conjunctiva/sclera: Conjunctivae normal.      Pupils: Pupils are equal, round, and reactive to light.   Pulmonary:      Effort: Pulmonary effort is normal. No respiratory distress.      Breath sounds: Normal breath sounds. No wheezing.   Abdominal:      General: There is no distension.      Palpations: Abdomen is soft.      Tenderness: There is no abdominal tenderness.   Musculoskeletal:         General: No edema.   Neurological:      Mental Status: She is alert and oriented to person, place, and time.   Psychiatric:         Mood and Affect: Mood and affect normal.         Behavior: Behavior normal.         Computed MELD-Na score unavailable. Necessary lab results were not found in the last year.  Computed MELD score unavailable. Necessary lab results were not found in the last year.    WBC   Date Value Ref Range Status   03/31/2021 7.45 3.90 - 12.70 K/uL Final     Hemoglobin   Date Value Ref Range Status   03/31/2021 13.9 12.0 - 16.0 g/dL Final     Hematocrit   Date Value Ref Range Status   03/31/2021 42.4 37.0 - 48.5 % Final     Platelets   Date Value Ref Range Status   03/31/2021 276 150 - 450 K/uL Final     BUN   Date  Value Ref Range Status   01/10/2022 8 6 - 20 mg/dL Final     Creatinine   Date Value Ref Range Status   01/10/2022 0.7 0.5 - 1.4 mg/dL Final     Glucose   Date Value Ref Range Status   01/10/2022 92 70 - 110 mg/dL Final     Calcium   Date Value Ref Range Status   01/10/2022 8.6 (L) 8.7 - 10.5 mg/dL Final     Sodium   Date Value Ref Range Status   01/10/2022 140 136 - 145 mmol/L Final     Potassium   Date Value Ref Range Status   01/10/2022 3.8 3.5 - 5.1 mmol/L Final     Chloride   Date Value Ref Range Status   01/10/2022 109 95 - 110 mmol/L Final     AST   Date Value Ref Range Status   01/10/2022 13 10 - 40 U/L Final     ALT   Date Value Ref Range Status   01/10/2022 12 10 - 44 U/L Final     Alkaline Phosphatase   Date Value Ref Range Status   01/10/2022 59 55 - 135 U/L Final     Total Bilirubin   Date Value Ref Range Status   01/10/2022 0.4 0.1 - 1.0 mg/dL Final     Comment:     For infants and newborns, interpretation of results should be based  on gestational age, weight and in agreement with clinical  observations.    Premature Infant recommended reference ranges:  Up to 24 hours.............<8.0 mg/dL  Up to 48 hours............<12.0 mg/dL  3-5 days..................<15.0 mg/dL  6-29 days.................<15.0 mg/dL       Albumin   Date Value Ref Range Status   01/10/2022 4.1 3.5 - 5.2 g/dL Final     No results found for: INR      Assessment/Plan:     1. Liver lesion    2. Liver disease, unspecified    3. Abnormal ultrasound of abdomen    4. Healthcare maintenance      Shruthi Riley is a 31 y.o. female withLiver lesion    Liver lesion/Liver disease, unspecified-needs f/u  -     AFP Tumor Marker; Standing  -     Comprehensive Metabolic Panel; Standing  -     MRI Abdomen W WO Contrast; Future; Expected date: 01/10/2022    Abnormal ultrasound of abdomen  -     MRI Abdomen W WO Contrast; Future; Expected date: 01/10/2022    Healthcare maintenance  -     Ambulatory referral/consult to Internal Medicine;  Future; Expected date: 01/17/2022    RTC in 6 months      Alda Vernon MD

## 2022-01-26 ENCOUNTER — TELEPHONE (OUTPATIENT)
Dept: HEPATOLOGY | Facility: CLINIC | Age: 32
End: 2022-01-26
Payer: MEDICAID

## 2022-01-26 NOTE — TELEPHONE ENCOUNTER
Spoke with patient and informed her of appointment. Patient verbalized understanding will get her schedule with a PCP. Informed patient that I would touch base with her this afternoon.

## 2022-02-07 ENCOUNTER — HOSPITAL ENCOUNTER (OUTPATIENT)
Dept: RADIOLOGY | Facility: HOSPITAL | Age: 32
Discharge: HOME | End: 2022-02-07
Attending: INTERNAL MEDICINE
Payer: MEDICAID

## 2022-02-07 ENCOUNTER — TELEPHONE (OUTPATIENT)
Dept: RADIOLOGY | Facility: HOSPITAL | Age: 32
End: 2022-02-07
Payer: MEDICAID

## 2022-02-07 DIAGNOSIS — R93.5 ABNORMAL ULTRASOUND OF ABDOMEN: ICD-10-CM

## 2022-02-07 DIAGNOSIS — K76.9 LIVER DISEASE, UNSPECIFIED: ICD-10-CM

## 2022-02-07 DIAGNOSIS — K76.9 LIVER LESION: ICD-10-CM

## 2022-02-07 PROCEDURE — 74183 MRI ABD W/O CNTR FLWD CNTR: CPT | Mod: 26,,, | Performed by: RADIOLOGY

## 2022-02-07 PROCEDURE — 74183 MRI ABDOMEN W WO CONTRAST: ICD-10-PCS | Mod: 26,,, | Performed by: RADIOLOGY

## 2022-02-07 PROCEDURE — 25500020 PHARM REV CODE 255: Performed by: INTERNAL MEDICINE

## 2022-02-07 PROCEDURE — 74183 MRI ABD W/O CNTR FLWD CNTR: CPT | Mod: TC

## 2022-02-07 PROCEDURE — A9581 GADOXETATE DISODIUM INJ: HCPCS | Performed by: INTERNAL MEDICINE

## 2022-02-07 RX ADMIN — GADOXETATE DISODIUM 8 ML: 181.43 INJECTION, SOLUTION INTRAVENOUS at 04:02

## 2022-03-23 ENCOUNTER — TELEPHONE (OUTPATIENT)
Dept: FAMILY MEDICINE | Facility: CLINIC | Age: 32
End: 2022-03-23
Payer: MEDICAID

## 2022-03-23 NOTE — TELEPHONE ENCOUNTER
----- Message from Rachel Lara sent at 3/23/2022 12:24 PM CDT -----  Pt would like a call back. Call back number is .[phone], Thx. El

## 2022-03-25 ENCOUNTER — LAB VISIT (OUTPATIENT)
Dept: LAB | Facility: HOSPITAL | Age: 32
End: 2022-03-25
Attending: NURSE PRACTITIONER
Payer: MEDICAID

## 2022-03-25 ENCOUNTER — OFFICE VISIT (OUTPATIENT)
Dept: FAMILY MEDICINE | Facility: CLINIC | Age: 32
End: 2022-03-25
Attending: FAMILY MEDICINE
Payer: MEDICAID

## 2022-03-25 VITALS
WEIGHT: 173.94 LBS | BODY MASS INDEX: 24.9 KG/M2 | HEIGHT: 70 IN | SYSTOLIC BLOOD PRESSURE: 120 MMHG | DIASTOLIC BLOOD PRESSURE: 80 MMHG | OXYGEN SATURATION: 99 % | HEART RATE: 105 BPM | TEMPERATURE: 96 F

## 2022-03-25 DIAGNOSIS — Z72.0 TOBACCO ABUSE: ICD-10-CM

## 2022-03-25 DIAGNOSIS — M25.541 ARTHRALGIA OF BOTH HANDS: ICD-10-CM

## 2022-03-25 DIAGNOSIS — M25.542 ARTHRALGIA OF BOTH HANDS: ICD-10-CM

## 2022-03-25 DIAGNOSIS — Z00.00 ANNUAL PHYSICAL EXAM: ICD-10-CM

## 2022-03-25 DIAGNOSIS — Z00.00 ANNUAL PHYSICAL EXAM: Primary | ICD-10-CM

## 2022-03-25 DIAGNOSIS — F90.9 ATTENTION DEFICIT HYPERACTIVITY DISORDER (ADHD), UNSPECIFIED ADHD TYPE: ICD-10-CM

## 2022-03-25 LAB
ALBUMIN SERPL BCP-MCNC: 4 G/DL (ref 3.5–5.2)
ALP SERPL-CCNC: 60 U/L (ref 55–135)
ALT SERPL W/O P-5'-P-CCNC: 11 U/L (ref 10–44)
ANION GAP SERPL CALC-SCNC: 8 MMOL/L (ref 8–16)
AST SERPL-CCNC: 14 U/L (ref 10–40)
BACTERIA #/AREA URNS AUTO: NORMAL /HPF
BASOPHILS # BLD AUTO: 0.05 K/UL (ref 0–0.2)
BASOPHILS NFR BLD: 0.7 % (ref 0–1.9)
BILIRUB SERPL-MCNC: 0.3 MG/DL (ref 0.1–1)
BILIRUB UR QL STRIP: NEGATIVE
BUN SERPL-MCNC: 9 MG/DL (ref 6–20)
CALCIUM SERPL-MCNC: 9.2 MG/DL (ref 8.7–10.5)
CHLORIDE SERPL-SCNC: 104 MMOL/L (ref 95–110)
CHOLEST SERPL-MCNC: 183 MG/DL (ref 120–199)
CHOLEST/HDLC SERPL: 3.7 {RATIO} (ref 2–5)
CLARITY UR REFRACT.AUTO: ABNORMAL
CO2 SERPL-SCNC: 24 MMOL/L (ref 23–29)
COLOR UR AUTO: YELLOW
CREAT SERPL-MCNC: 0.7 MG/DL (ref 0.5–1.4)
DIFFERENTIAL METHOD: NORMAL
EOSINOPHIL # BLD AUTO: 0.1 K/UL (ref 0–0.5)
EOSINOPHIL NFR BLD: 1.1 % (ref 0–8)
ERYTHROCYTE [DISTWIDTH] IN BLOOD BY AUTOMATED COUNT: 13.4 % (ref 11.5–14.5)
EST. GFR  (AFRICAN AMERICAN): >60 ML/MIN/1.73 M^2
EST. GFR  (NON AFRICAN AMERICAN): >60 ML/MIN/1.73 M^2
GLUCOSE SERPL-MCNC: 85 MG/DL (ref 70–110)
GLUCOSE UR QL STRIP: NEGATIVE
HCT VFR BLD AUTO: 41.4 % (ref 37–48.5)
HDLC SERPL-MCNC: 50 MG/DL (ref 40–75)
HDLC SERPL: 27.3 % (ref 20–50)
HGB BLD-MCNC: 13.3 G/DL (ref 12–16)
HGB UR QL STRIP: NEGATIVE
IMM GRANULOCYTES # BLD AUTO: 0.02 K/UL (ref 0–0.04)
IMM GRANULOCYTES NFR BLD AUTO: 0.3 % (ref 0–0.5)
KETONES UR QL STRIP: NEGATIVE
LDLC SERPL CALC-MCNC: 118.4 MG/DL (ref 63–159)
LEUKOCYTE ESTERASE UR QL STRIP: ABNORMAL
LYMPHOCYTES # BLD AUTO: 2.4 K/UL (ref 1–4.8)
LYMPHOCYTES NFR BLD: 32.2 % (ref 18–48)
MCH RBC QN AUTO: 28.9 PG (ref 27–31)
MCHC RBC AUTO-ENTMCNC: 32.1 G/DL (ref 32–36)
MCV RBC AUTO: 90 FL (ref 82–98)
MICROSCOPIC COMMENT: NORMAL
MONOCYTES # BLD AUTO: 0.7 K/UL (ref 0.3–1)
MONOCYTES NFR BLD: 9.3 % (ref 4–15)
NEUTROPHILS # BLD AUTO: 4.2 K/UL (ref 1.8–7.7)
NEUTROPHILS NFR BLD: 56.4 % (ref 38–73)
NITRITE UR QL STRIP: NEGATIVE
NONHDLC SERPL-MCNC: 133 MG/DL
NRBC BLD-RTO: 0 /100 WBC
PH UR STRIP: 8 [PH] (ref 5–8)
PLATELET # BLD AUTO: 308 K/UL (ref 150–450)
PMV BLD AUTO: 10.7 FL (ref 9.2–12.9)
POTASSIUM SERPL-SCNC: 4.3 MMOL/L (ref 3.5–5.1)
PROT SERPL-MCNC: 6.7 G/DL (ref 6–8.4)
PROT UR QL STRIP: NEGATIVE
RBC # BLD AUTO: 4.6 M/UL (ref 4–5.4)
RBC #/AREA URNS AUTO: 0 /HPF (ref 0–4)
SODIUM SERPL-SCNC: 136 MMOL/L (ref 136–145)
SP GR UR STRIP: 1.01 (ref 1–1.03)
SQUAMOUS #/AREA URNS AUTO: 17 /HPF
TRIGL SERPL-MCNC: 73 MG/DL (ref 30–150)
TSH SERPL DL<=0.005 MIU/L-ACNC: 0.92 UIU/ML (ref 0.4–4)
URN SPEC COLLECT METH UR: ABNORMAL
WBC # BLD AUTO: 7.34 K/UL (ref 3.9–12.7)
WBC #/AREA URNS AUTO: 2 /HPF (ref 0–5)

## 2022-03-25 PROCEDURE — 1159F MED LIST DOCD IN RCRD: CPT | Mod: CPTII,,, | Performed by: NURSE PRACTITIONER

## 2022-03-25 PROCEDURE — 84443 ASSAY THYROID STIM HORMONE: CPT | Performed by: NURSE PRACTITIONER

## 2022-03-25 PROCEDURE — 3074F SYST BP LT 130 MM HG: CPT | Mod: CPTII,,, | Performed by: NURSE PRACTITIONER

## 2022-03-25 PROCEDURE — 3008F PR BODY MASS INDEX (BMI) DOCUMENTED: ICD-10-PCS | Mod: CPTII,,, | Performed by: NURSE PRACTITIONER

## 2022-03-25 PROCEDURE — 80061 LIPID PANEL: CPT | Performed by: NURSE PRACTITIONER

## 2022-03-25 PROCEDURE — 1160F PR REVIEW ALL MEDS BY PRESCRIBER/CLIN PHARMACIST DOCUMENTED: ICD-10-PCS | Mod: CPTII,,, | Performed by: NURSE PRACTITIONER

## 2022-03-25 PROCEDURE — 99395 PREV VISIT EST AGE 18-39: CPT | Mod: S$PBB,,, | Performed by: NURSE PRACTITIONER

## 2022-03-25 PROCEDURE — 99213 OFFICE O/P EST LOW 20 MIN: CPT | Mod: PBBFAC,PO | Performed by: NURSE PRACTITIONER

## 2022-03-25 PROCEDURE — 81001 URINALYSIS AUTO W/SCOPE: CPT | Performed by: NURSE PRACTITIONER

## 2022-03-25 PROCEDURE — 85025 COMPLETE CBC W/AUTO DIFF WBC: CPT | Performed by: NURSE PRACTITIONER

## 2022-03-25 PROCEDURE — 3079F DIAST BP 80-89 MM HG: CPT | Mod: CPTII,,, | Performed by: NURSE PRACTITIONER

## 2022-03-25 PROCEDURE — 99999 PR PBB SHADOW E&M-EST. PATIENT-LVL III: ICD-10-PCS | Mod: PBBFAC,,, | Performed by: NURSE PRACTITIONER

## 2022-03-25 PROCEDURE — 36415 COLL VENOUS BLD VENIPUNCTURE: CPT | Mod: PO | Performed by: NURSE PRACTITIONER

## 2022-03-25 PROCEDURE — 99999 PR PBB SHADOW E&M-EST. PATIENT-LVL III: CPT | Mod: PBBFAC,,, | Performed by: NURSE PRACTITIONER

## 2022-03-25 PROCEDURE — 99395 PR PREVENTIVE VISIT,EST,18-39: ICD-10-PCS | Mod: S$PBB,,, | Performed by: NURSE PRACTITIONER

## 2022-03-25 PROCEDURE — 3079F PR MOST RECENT DIASTOLIC BLOOD PRESSURE 80-89 MM HG: ICD-10-PCS | Mod: CPTII,,, | Performed by: NURSE PRACTITIONER

## 2022-03-25 PROCEDURE — 1159F PR MEDICATION LIST DOCUMENTED IN MEDICAL RECORD: ICD-10-PCS | Mod: CPTII,,, | Performed by: NURSE PRACTITIONER

## 2022-03-25 PROCEDURE — 1160F RVW MEDS BY RX/DR IN RCRD: CPT | Mod: CPTII,,, | Performed by: NURSE PRACTITIONER

## 2022-03-25 PROCEDURE — 80053 COMPREHEN METABOLIC PANEL: CPT | Performed by: NURSE PRACTITIONER

## 2022-03-25 PROCEDURE — 3074F PR MOST RECENT SYSTOLIC BLOOD PRESSURE < 130 MM HG: ICD-10-PCS | Mod: CPTII,,, | Performed by: NURSE PRACTITIONER

## 2022-03-25 PROCEDURE — 3008F BODY MASS INDEX DOCD: CPT | Mod: CPTII,,, | Performed by: NURSE PRACTITIONER

## 2022-03-25 RX ORDER — DEXTROAMPHETAMINE SACCHARATE, AMPHETAMINE ASPARTATE MONOHYDRATE, DEXTROAMPHETAMINE SULFATE AND AMPHETAMINE SULFATE 3.75; 3.75; 3.75; 3.75 MG/1; MG/1; MG/1; MG/1
15 CAPSULE, EXTENDED RELEASE ORAL EVERY MORNING
Qty: 30 CAPSULE | Refills: 0 | Status: SHIPPED | OUTPATIENT
Start: 2022-04-01 | End: 2022-05-02 | Stop reason: DRUGHIGH

## 2022-03-25 RX ORDER — METHYLPREDNISOLONE 4 MG/1
TABLET ORAL
Qty: 21 EACH | Refills: 0 | Status: SHIPPED | OUTPATIENT
Start: 2022-03-25 | End: 2022-04-15

## 2022-03-25 RX ORDER — VALACYCLOVIR HYDROCHLORIDE 1 G/1
1000 TABLET, FILM COATED ORAL 2 TIMES DAILY PRN
Qty: 20 TABLET | Refills: 1 | Status: SHIPPED | OUTPATIENT
Start: 2022-03-25 | End: 2022-04-04

## 2022-03-25 RX ORDER — VILAZODONE HYDROCHLORIDE 20 MG/1
1 TABLET ORAL DAILY
Qty: 30 TABLET | Refills: 0 | Status: SHIPPED | OUTPATIENT
Start: 2022-03-25 | End: 2022-06-02 | Stop reason: SDUPTHER

## 2022-03-25 RX ORDER — DEXTROAMPHETAMINE SACCHARATE, AMPHETAMINE ASPARTATE MONOHYDRATE, DEXTROAMPHETAMINE SULFATE AND AMPHETAMINE SULFATE 3.75; 3.75; 3.75; 3.75 MG/1; MG/1; MG/1; MG/1
15 CAPSULE, EXTENDED RELEASE ORAL EVERY MORNING
Qty: 30 CAPSULE | Refills: 0 | Status: SHIPPED | OUTPATIENT
Start: 2022-05-01 | End: 2022-05-02 | Stop reason: DRUGHIGH

## 2022-03-25 RX ORDER — VALACYCLOVIR HYDROCHLORIDE 1 G/1
1000 TABLET, FILM COATED ORAL 2 TIMES DAILY
COMMUNITY

## 2022-03-25 NOTE — PROGRESS NOTES
"Subjective:       Patient ID: Shruthi Riley is a 31 y.o. female.    Chief Complaint: Annual Exam  Pt reports to clinic for annual physical exam.  PMH: hep c, tobacco abuse 0.5pk/day and poly substance drug abuse.  Has been sober 5 years.  Works at [employer].  Previously attend counseling with Dr. Elam/ Jeffry Collins.  Diagnosed with Major Depressive Disorder    Notes frequent bilateral hand pain.  Present for years.  Reports swelling and stiffness.  "run  Hot water on them".  Notes deformity of digits.  Reports grandmother with history of RA aunt with SLE    Dextroamp-Amphetamin 15 Mg Tab   30.00  30  Ro Nei  [id number]  [place] ([id number][place]  0/0   [place]         02/01/2022 02/01/2022 02/01/2022  2  Dextroamp-Amphetamin 15 Mg Tab   30.00  30  Ro Nei  [id number]  [place] ([id number][place]  [place]   [place]     12/28/2021 12/28/2021 12/28/2021  2  Dextroamp-Amphetamin 15 Mg Tab   30.00  30  Ro Nei  [id number]  [place] ([id number][place]  [place]   [place]     11/30/2021 11/30/2021 11/30/2021  2  Dextroamp-Amphetamin 15 Mg Tab   30.00  30  [place]i  [id number]  [place] ([id number][place]  [place]   [place]     10/28/2021  10/28/2021  10/28/2021  2  Dextroamp-Amphetamin 15 Mg Tab   30.00  30  [place]i  [id number]  [place] ([id number])  [place]   [place]     09/28/2021 09/28/2021 09/28/2021  2  Dextroamp-Amphetamin 15 Mg Tab   30.00  30  Ro [place]i  [id number]  [place] ([id number][place]  [place]   [place]     08/25/2021 07/27/2021 08/25/2021  2  Dextroamp-Amphetamin 15 Mg Tab   30.00  30  Ro [place]i  [id number]  [place] ([id number][place]  [place]   [place]     07/28/2021 07/06/2021 07/28/2021  2  Dextroamp-Amphetamin 15 Mg Tab   30.00  30  [place]i  [id number]  [place] ([id number][place]  [place]   [place]     07/01/202101/2021 07/01/2021 07/01/2021  2  Dextroamp-Amphetamin 15 Mg Tab   30.00  30  Ro Nei  [id number]  [place] ([id number])  0/0   [place]     06/01/2021 04/30/2021 06/01/2021  2  Dextroamp-Amphetamin 15 Mg Tab   30.00  30  Ro Aleta  [id number]  [place] ([id number])  0/0   [place]     05/04/2021 04/06/2021 05/04/2021  2  Dextroamp-Amphetamin 15 Mg Tab   " 30.00  30  Ro [place]i  [id number]  [place] ([id number][place]  0/[place]   [place]     03/23/2021 03/23/2021 03/23/2021  2  Dextroamp-Amphetamin 20 Mg Tab   30.00  30  [place]  [id number]  [place] ([id number][place]  0/0   [place]     01/20/2021 01/14/2021 01/20/2021  2  Temazepam 15 Mg Capsule   30.00  30  Ro Nei  [id number]  [place] ([id number])  0/1  0.75 LME          HPI  Review of Systems   Constitutional: Negative.    HENT: Negative.    Respiratory: Negative.    Gastrointestinal: Negative.    Genitourinary: Negative.    Musculoskeletal: Positive for arthralgias.   Skin: Negative.    Neurological: Negative.    Psychiatric/Behavioral: Positive for decreased concentration.       Objective:      Physical Exam  Vitals reviewed.   Constitutional:       Appearance: She is well-developed.   HENT:      Head: Normocephalic.      Mouth/Throat:      Pharynx: No oropharyngeal exudate.   Eyes:      Pupils: Pupils are equal, round, and reactive to light.   Neck:      Vascular: No JVD.      Trachea: No tracheal deviation.   Cardiovascular:      Rate and Rhythm: Normal rate and regular rhythm.      Heart sounds: Normal heart sounds. No murmur heard.  Pulmonary:      Effort: Pulmonary effort is normal. No respiratory distress.      Breath sounds: Normal breath sounds.   Abdominal:      General: Bowel sounds are normal. There is no distension.      Palpations: Abdomen is soft.      Tenderness: There is no abdominal tenderness.   Musculoskeletal:         General: Normal range of motion.      Cervical back: Normal range of motion.   Skin:     General: Skin is warm and dry.   Neurological:      Mental Status: She is alert and oriented to person, place, and time.      Deep Tendon Reflexes: Reflexes are normal and symmetric.   Psychiatric:         Speech: Speech normal.         Behavior: Behavior normal.         Assessment:       1. Annual physical exam    2. Tobacco abuse    3. Attention deficit hyperactivity disorder (ADHD), unspecified ADHD type    4. Arthralgia of both hands         Plan:   Annual physical exam  -     CBC Auto Differential; Future; Expected date: 03/25/2022  -     Comprehensive Metabolic Panel; Future; Expected date: 03/25/2022  -     Lipid Panel; Future; Expected date: 03/25/2022  -     URINALYSIS  -     TSH; Future; Expected date: 03/25/2022    Tobacco abuse  Smoking cessation   Attention deficit hyperactivity disorder (ADHD), unspecified ADHD type  -     dextroamphetamine-amphetamine (ADDERALL XR) 15 MG 24 hr capsule; Take 1 capsule (15 mg total) by mouth every morning.  Dispense: 30 capsule; Refill: 0  -     VIIBRYD 20 mg Tab; Take 1 tablet (20 mg total) by mouth once daily at 6am.  Dispense: 30 tablet; Refill: 0  -     dextroamphetamine-amphetamine (ADDERALL XR) 15 MG 24 hr capsule; Take 1 capsule (15 mg total) by mouth every morning.  Dispense: 30 capsule; Refill: 0  Records received from psychiatrist office and reviewed.   appropriate.  Refill sent   Arthralgia of both hands  -     Sedimentation rate; Future; Expected date: 03/25/2022  -     C-reactive protein; Future; Expected date: 03/25/2022  -     MILE Screen w/Reflex; Future; Expected date: 03/25/2022  -     Rheumatoid Factor; Future; Expected date: 03/25/2022  -     methylPREDNISolone (MEDROL DOSEPACK) 4 mg tablet; use as directed  Dispense: 21 each; Refill: 0    Other orders  -     valACYclovir (VALTREX) 1000 MG tablet; Take 1 tablet (1,000 mg total) by mouth 2 (two) times daily as needed.  Dispense: 20 tablet; Refill: 1      No follow-ups on file.

## 2022-03-28 ENCOUNTER — PATIENT MESSAGE (OUTPATIENT)
Dept: FAMILY MEDICINE | Facility: CLINIC | Age: 32
End: 2022-03-28
Payer: MEDICAID

## 2022-04-08 ENCOUNTER — OFFICE VISIT (OUTPATIENT)
Dept: FAMILY MEDICINE | Facility: CLINIC | Age: 32
End: 2022-04-08
Payer: MEDICAID

## 2022-04-08 ENCOUNTER — PATIENT MESSAGE (OUTPATIENT)
Dept: FAMILY MEDICINE | Facility: CLINIC | Age: 32
End: 2022-04-08

## 2022-04-08 DIAGNOSIS — Z72.0 TOBACCO ABUSE: ICD-10-CM

## 2022-04-08 DIAGNOSIS — J20.9 ACUTE BRONCHITIS, UNSPECIFIED ORGANISM: Primary | ICD-10-CM

## 2022-04-08 PROCEDURE — 99213 PR OFFICE/OUTPT VISIT, EST, LEVL III, 20-29 MIN: ICD-10-PCS | Mod: 95,,, | Performed by: NURSE PRACTITIONER

## 2022-04-08 PROCEDURE — 99213 OFFICE O/P EST LOW 20 MIN: CPT | Mod: 95,,, | Performed by: NURSE PRACTITIONER

## 2022-04-08 RX ORDER — LEVOCETIRIZINE DIHYDROCHLORIDE 5 MG/1
5 TABLET, FILM COATED ORAL NIGHTLY
Qty: 90 TABLET | Refills: 3 | Status: SHIPPED | OUTPATIENT
Start: 2022-04-08 | End: 2023-04-08

## 2022-04-08 RX ORDER — FLUTICASONE PROPIONATE 50 MCG
2 SPRAY, SUSPENSION (ML) NASAL DAILY
Qty: 16 G | Refills: 0 | Status: SHIPPED | OUTPATIENT
Start: 2022-04-08

## 2022-04-08 RX ORDER — ALBUTEROL SULFATE 90 UG/1
2 AEROSOL, METERED RESPIRATORY (INHALATION) EVERY 6 HOURS PRN
Qty: 18 G | Refills: 0 | Status: SHIPPED | OUTPATIENT
Start: 2022-04-08 | End: 2022-11-10 | Stop reason: SDUPTHER

## 2022-04-08 RX ORDER — AZITHROMYCIN 250 MG/1
TABLET, FILM COATED ORAL
Qty: 6 TABLET | Refills: 0 | Status: SHIPPED | OUTPATIENT
Start: 2022-04-08 | End: 2022-04-13

## 2022-04-08 RX ORDER — PREDNISONE 20 MG/1
TABLET ORAL
Qty: 7 TABLET | Refills: 0 | Status: SHIPPED | OUTPATIENT
Start: 2022-04-08 | End: 2022-04-13

## 2022-04-08 NOTE — PROGRESS NOTES
Subjective:       Patient ID: Shruthi Riley is a 31 y.o. female.    Chief Complaint: No chief complaint on file.  The patient location is: [place]   The chief complaint leading to consultation is: cough and congestio     Visit type: audiovisual    Face to Face time with patient: 10  10 minutes of total time spent on the encounter, which includes face to face time and non-face to face time preparing to see the patient (eg, review of tests), Obtaining and/or reviewing separately obtained history, Documenting clinical information in the electronic or other health record, Independently interpreting results (not separately reported) and communicating results to the patient/family/caregiver, or Care coordination (not separately reported).         Each patient to whom he or she provides medical services by telemedicine is:  (1) informed of the relationship between the physician and patient and the respective role of any other health care provider with respect to management of the patient; and (2) notified that he or she may decline to receive medical services by telemedicine and may withdraw from such care at any time.    Notes: Pt reports to tele medicine with chief complaint of cough and congestion. Mother sick with similar symptoms.  Neg covid test.  No fever or chills.  Reports wheezing, and green sputum.  Not taking anything for relief  Shortness of Breath  This is a new problem. The current episode started in the past 7 days. The problem occurs constantly. The problem has been gradually worsening. The average episode lasts 2 days. Associated symptoms include chest pain, coryza, PND, rhinorrhea, a sore throat, sputum production, swollen glands and wheezing. Pertinent negatives include no abdominal pain, claudication, ear pain, fever, headaches, hemoptysis, leg pain, leg swelling, neck pain, orthopnea, rash, syncope or vomiting. The symptoms are aggravated by exercise and weather changes. The patient has no  known risk factors for DVT/PE. The treatment provided no relief. There is no history of allergies, aspirin allergies, asthma, bronchiolitis, CAD, chronic lung disease, DVT, a heart failure, PE, pneumonia or a recent surgery.     Review of Systems   Constitutional: Negative for fever.   HENT: Positive for rhinorrhea and sore throat. Negative for ear pain.    Respiratory: Positive for sputum production, shortness of breath and wheezing. Negative for hemoptysis.    Cardiovascular: Positive for chest pain and PND. Negative for orthopnea, claudication, leg swelling and syncope.   Gastrointestinal: Negative for abdominal pain and vomiting.   Musculoskeletal: Negative for neck pain.   Skin: Negative for rash.   Neurological: Negative for headaches.       Objective:      Physical Exam  HENT:      Head: Normocephalic.      Right Ear: External ear normal.      Left Ear: External ear normal.      Nose: Congestion present.   Eyes:      Extraocular Movements: Extraocular movements intact.   Pulmonary:      Effort: Pulmonary effort is normal. No respiratory distress (speaks without panting ).   Skin:     Coloration: Skin is not jaundiced.   Neurological:      Mental Status: She is alert and oriented to person, place, and time.   Psychiatric:         Behavior: Behavior normal.         Assessment:       1. Acute bronchitis, unspecified organism    2. Tobacco abuse        Plan:   Acute bronchitis, unspecified organism  -     albuterol (PROVENTIL/VENTOLIN HFA) 90 mcg/actuation inhaler; Inhale 2 puffs into the lungs every 6 (six) hours as needed for Wheezing. Dispense with spacer.  Dispense: 18 g; Refill: 0  -     predniSONE (DELTASONE) 20 MG tablet; Take 2 tablets (40 mg total) by mouth once daily for 2 days, THEN 1 tablet (20 mg total) once daily for 3 days.  Dispense: 7 tablet; Refill: 0  -     azithromycin (Z-THADDEUS) 250 MG tablet; Take 2 tablets by mouth on day 1; Take 1 tablet by mouth on days 2-5  Dispense: 6 tablet; Refill: 0  -      fluticasone propionate (FLONASE) 50 mcg/actuation nasal spray; 2 sprays (100 mcg total) by Each Nostril route once daily.  Dispense: 16 g; Refill: 0  -     levocetirizine (XYZAL) 5 MG tablet; Take 1 tablet (5 mg total) by mouth every evening.  Dispense: 90 tablet; Refill: 3    Tobacco abuse      No follow-ups on file.

## 2022-04-13 ENCOUNTER — PATIENT MESSAGE (OUTPATIENT)
Dept: FAMILY MEDICINE | Facility: CLINIC | Age: 32
End: 2022-04-13
Payer: MEDICAID

## 2022-05-02 ENCOUNTER — OFFICE VISIT (OUTPATIENT)
Dept: FAMILY MEDICINE | Facility: CLINIC | Age: 32
End: 2022-05-02
Payer: MEDICAID

## 2022-05-02 VITALS
OXYGEN SATURATION: 98 % | WEIGHT: 173.06 LBS | DIASTOLIC BLOOD PRESSURE: 80 MMHG | SYSTOLIC BLOOD PRESSURE: 130 MMHG | HEART RATE: 113 BPM | TEMPERATURE: 97 F | HEIGHT: 69 IN | BODY MASS INDEX: 25.63 KG/M2

## 2022-05-02 DIAGNOSIS — Z01.419 WELL WOMAN EXAM WITH ROUTINE GYNECOLOGICAL EXAM: Primary | ICD-10-CM

## 2022-05-02 DIAGNOSIS — F90.9 ATTENTION DEFICIT HYPERACTIVITY DISORDER (ADHD), UNSPECIFIED ADHD TYPE: ICD-10-CM

## 2022-05-02 PROCEDURE — G0101 PR CA SCREEN;PELVIC/BREAST EXAM: ICD-10-PCS | Mod: S$PBB,,, | Performed by: NURSE PRACTITIONER

## 2022-05-02 PROCEDURE — 3008F PR BODY MASS INDEX (BMI) DOCUMENTED: ICD-10-PCS | Mod: CPTII,,, | Performed by: NURSE PRACTITIONER

## 2022-05-02 PROCEDURE — G0101 CA SCREEN;PELVIC/BREAST EXAM: HCPCS | Mod: PBBFAC,PO | Performed by: NURSE PRACTITIONER

## 2022-05-02 PROCEDURE — 99395 PR PREVENTIVE VISIT,EST,18-39: ICD-10-PCS | Mod: S$PBB,,, | Performed by: NURSE PRACTITIONER

## 2022-05-02 PROCEDURE — 99999 PR PBB SHADOW E&M-EST. PATIENT-LVL III: CPT | Mod: PBBFAC,,, | Performed by: NURSE PRACTITIONER

## 2022-05-02 PROCEDURE — 1160F PR REVIEW ALL MEDS BY PRESCRIBER/CLIN PHARMACIST DOCUMENTED: ICD-10-PCS | Mod: CPTII,,, | Performed by: NURSE PRACTITIONER

## 2022-05-02 PROCEDURE — 3075F SYST BP GE 130 - 139MM HG: CPT | Mod: CPTII,,, | Performed by: NURSE PRACTITIONER

## 2022-05-02 PROCEDURE — 87624 HPV HI-RISK TYP POOLED RSLT: CPT | Performed by: NURSE PRACTITIONER

## 2022-05-02 PROCEDURE — 1159F MED LIST DOCD IN RCRD: CPT | Mod: CPTII,,, | Performed by: NURSE PRACTITIONER

## 2022-05-02 PROCEDURE — 3075F PR MOST RECENT SYSTOLIC BLOOD PRESS GE 130-139MM HG: ICD-10-PCS | Mod: CPTII,,, | Performed by: NURSE PRACTITIONER

## 2022-05-02 PROCEDURE — 99395 PREV VISIT EST AGE 18-39: CPT | Mod: S$PBB,,, | Performed by: NURSE PRACTITIONER

## 2022-05-02 PROCEDURE — G0101 CA SCREEN;PELVIC/BREAST EXAM: HCPCS | Mod: S$PBB,,, | Performed by: NURSE PRACTITIONER

## 2022-05-02 PROCEDURE — 3079F PR MOST RECENT DIASTOLIC BLOOD PRESSURE 80-89 MM HG: ICD-10-PCS | Mod: CPTII,,, | Performed by: NURSE PRACTITIONER

## 2022-05-02 PROCEDURE — 88175 CYTOPATH C/V AUTO FLUID REDO: CPT | Performed by: NURSE PRACTITIONER

## 2022-05-02 PROCEDURE — 1159F PR MEDICATION LIST DOCUMENTED IN MEDICAL RECORD: ICD-10-PCS | Mod: CPTII,,, | Performed by: NURSE PRACTITIONER

## 2022-05-02 PROCEDURE — 3079F DIAST BP 80-89 MM HG: CPT | Mod: CPTII,,, | Performed by: NURSE PRACTITIONER

## 2022-05-02 PROCEDURE — 3008F BODY MASS INDEX DOCD: CPT | Mod: CPTII,,, | Performed by: NURSE PRACTITIONER

## 2022-05-02 PROCEDURE — 99999 PR PBB SHADOW E&M-EST. PATIENT-LVL III: ICD-10-PCS | Mod: PBBFAC,,, | Performed by: NURSE PRACTITIONER

## 2022-05-02 PROCEDURE — 99213 OFFICE O/P EST LOW 20 MIN: CPT | Mod: PBBFAC,PO | Performed by: NURSE PRACTITIONER

## 2022-05-02 PROCEDURE — 1160F RVW MEDS BY RX/DR IN RCRD: CPT | Mod: CPTII,,, | Performed by: NURSE PRACTITIONER

## 2022-05-02 RX ORDER — DEXTROAMPHETAMINE SACCHARATE, AMPHETAMINE ASPARTATE, DEXTROAMPHETAMINE SULFATE AND AMPHETAMINE SULFATE 3.75; 3.75; 3.75; 3.75 MG/1; MG/1; MG/1; MG/1
15 TABLET ORAL DAILY
Qty: 30 TABLET | Refills: 0 | Status: SHIPPED | OUTPATIENT
Start: 2022-05-02 | End: 2022-06-02 | Stop reason: SDUPTHER

## 2022-05-02 NOTE — PROGRESS NOTES
Subjective:       Patient ID: Shruthi Riley is a 31 y.o. female.    Chief Complaint: Well Woman  Pt reports to clinic for well woman' exam.  ; LMP:4 weeks ago. Reports regular, notes heavy bleeding for 1-2 days.  Hx of bilat salpingectomy.  .  Denies hx of abnormal pap.  No FMH: of breast or cervical CA    Past Medical History:   Diagnosis Date    Depression     Hepatitis C     s/p Mavyret    IV drug abuse     Quit 3 years ago      Gynecologic Exam  The patient's primary symptoms include vaginal discharge. The patient's pertinent negatives include no genital itching, genital lesions, genital odor, genital rash, missed menses or pelvic pain. The current episode started in the past 7 days. The problem has been unchanged. The patient is experiencing no pain. She is not pregnant. Pertinent negatives include no dysuria. The vaginal discharge was white. She has not been passing clots. She has not been passing tissue. Nothing aggravates the symptoms. She has tried antifungals for the symptoms. She is sexually active. No, her partner does not have an STD. She uses nothing for contraception. Her menstrual history has been regular. Her past medical history is significant for a gynecological surgery and miscarriage.     Review of Systems   HENT: Negative.    Respiratory: Negative.    Cardiovascular: Negative.    Gastrointestinal: Negative.    Genitourinary: Positive for vaginal discharge. Negative for dysuria, missed menses and pelvic pain.   Musculoskeletal: Negative.    Neurological: Negative.        Objective:      Physical Exam  Vitals reviewed. Exam conducted with a chaperone present.   HENT:      Head: Normocephalic.      Right Ear: External ear normal.      Left Ear: External ear normal.      Mouth/Throat:      Mouth: Mucous membranes are dry.   Eyes:      Extraocular Movements: Extraocular movements intact.   Cardiovascular:      Rate and Rhythm: Normal rate.      Heart sounds: Normal heart sounds.    Pulmonary:      Effort: Pulmonary effort is normal.      Breath sounds: Normal breath sounds.   Chest:   Breasts: Breasts are symmetrical.      Right: Normal. No swelling, bleeding, inverted nipple, mass, nipple discharge, skin change or tenderness.      Left: Normal. No swelling, bleeding, inverted nipple, mass, nipple discharge, skin change or tenderness.       Genitourinary:     Cervix: No discharge, erythema, cervical bleeding or eversion.      Adnexa:         Right: No tenderness.          Left: No tenderness.     Musculoskeletal:      Cervical back: Normal range of motion.   Skin:     General: Skin is warm and dry.   Neurological:      General: No focal deficit present.      Mental Status: She is alert and oriented to person, place, and time.   Psychiatric:         Behavior: Behavior normal.         Assessment:       1. Well woman exam with routine gynecological exam    2. Attention deficit hyperactivity disorder (ADHD), unspecified ADHD type        Plan:   Well woman exam with routine gynecological exam  -     Liquid-Based Pap Smear, Screening  -     HPV High Risk Genotypes, PCR    Attention deficit hyperactivity disorder (ADHD), unspecified ADHD type  -     dextroamphetamine-amphetamine (ADDERALL) 15 mg tablet; Take 1 tablet (15 mg total) by mouth once daily.  Dispense: 30 tablet; Refill: 0      No follow-ups on file.

## 2022-05-05 ENCOUNTER — PATIENT MESSAGE (OUTPATIENT)
Dept: FAMILY MEDICINE | Facility: CLINIC | Age: 32
End: 2022-05-05
Payer: MEDICAID

## 2022-05-09 LAB
FINAL PATHOLOGIC DIAGNOSIS: NORMAL
HPV HR 12 DNA SPEC QL NAA+PROBE: NEGATIVE
HPV16 AG SPEC QL: NEGATIVE
HPV18 DNA SPEC QL NAA+PROBE: NEGATIVE
Lab: NORMAL

## 2022-05-20 ENCOUNTER — PATIENT MESSAGE (OUTPATIENT)
Dept: FAMILY MEDICINE | Facility: CLINIC | Age: 32
End: 2022-05-20
Payer: MEDICAID

## 2022-05-23 ENCOUNTER — TELEPHONE (OUTPATIENT)
Dept: HEPATOLOGY | Facility: CLINIC | Age: 32
End: 2022-05-23
Payer: MEDICAID

## 2022-05-23 NOTE — TELEPHONE ENCOUNTER
"Returned patient's call. Patient stated that she was suffering from a "severe herpes infection in her vagina canal and her eye" and suspects that her epclusa treatment that she has finished several months ago may have triggered this immune response.  I informed the patient that no her treatment wouldn't cause that. I advised the patient to contact her PCP for this issue. Patient voiced understanding.  "

## 2022-05-23 NOTE — TELEPHONE ENCOUNTER
----- Message from Lea Ba sent at 5/23/2022  3:22 PM CDT -----  Type:  Sooner Apoointment Request    Caller is requesting a sooner appointment.  Caller declined first available appointment listed below.  Caller will not accept being placed on the waitlist and is requesting a message be sent to doctor.  Name of Caller:pt   When is the first available appointment?none   Symptoms: viral infection   Would the patient rather a call back or a response via [facility]? My [facility]   Best Call Back Number:[phone]  Additional Information: pt has been having a severe viral infection HVF. Pt is experiencing it in eyes and vagina. Pt would like appt asap. Pt is open to telehealth visit as well.

## 2022-06-02 DIAGNOSIS — F90.9 ATTENTION DEFICIT HYPERACTIVITY DISORDER (ADHD), UNSPECIFIED ADHD TYPE: ICD-10-CM

## 2022-06-02 RX ORDER — VILAZODONE HYDROCHLORIDE 20 MG/1
1 TABLET ORAL DAILY
Qty: 30 TABLET | Refills: 0 | Status: SHIPPED | OUTPATIENT
Start: 2022-06-02 | End: 2022-06-30 | Stop reason: SDUPTHER

## 2022-06-02 RX ORDER — DEXTROAMPHETAMINE SACCHARATE, AMPHETAMINE ASPARTATE, DEXTROAMPHETAMINE SULFATE AND AMPHETAMINE SULFATE 3.75; 3.75; 3.75; 3.75 MG/1; MG/1; MG/1; MG/1
15 TABLET ORAL DAILY
Qty: 30 TABLET | Refills: 0 | Status: SHIPPED | OUTPATIENT
Start: 2022-06-02 | End: 2022-06-30 | Stop reason: SDUPTHER

## 2022-07-18 ENCOUNTER — LAB VISIT (OUTPATIENT)
Dept: LAB | Facility: HOSPITAL | Age: 32
End: 2022-07-18
Attending: INTERNAL MEDICINE
Payer: MEDICAID

## 2022-07-18 ENCOUNTER — PATIENT MESSAGE (OUTPATIENT)
Dept: HEPATOLOGY | Facility: CLINIC | Age: 32
End: 2022-07-18
Payer: MEDICAID

## 2022-07-18 DIAGNOSIS — K76.9 LIVER LESION: ICD-10-CM

## 2022-07-18 LAB
AFP SERPL-MCNC: 2.4 NG/ML (ref 0–8.4)
ALBUMIN SERPL BCP-MCNC: 4.1 G/DL (ref 3.5–5.2)
ALP SERPL-CCNC: 59 U/L (ref 55–135)
ALT SERPL W/O P-5'-P-CCNC: 8 U/L (ref 10–44)
ANION GAP SERPL CALC-SCNC: 8 MMOL/L (ref 8–16)
AST SERPL-CCNC: 11 U/L (ref 10–40)
BILIRUB SERPL-MCNC: 0.3 MG/DL (ref 0.1–1)
BUN SERPL-MCNC: 12 MG/DL (ref 6–20)
CALCIUM SERPL-MCNC: 9.3 MG/DL (ref 8.7–10.5)
CHLORIDE SERPL-SCNC: 109 MMOL/L (ref 95–110)
CO2 SERPL-SCNC: 25 MMOL/L (ref 23–29)
CREAT SERPL-MCNC: 0.7 MG/DL (ref 0.5–1.4)
EST. GFR  (AFRICAN AMERICAN): >60 ML/MIN/1.73 M^2
EST. GFR  (NON AFRICAN AMERICAN): >60 ML/MIN/1.73 M^2
GLUCOSE SERPL-MCNC: 96 MG/DL (ref 70–110)
POTASSIUM SERPL-SCNC: 4.6 MMOL/L (ref 3.5–5.1)
PROT SERPL-MCNC: 6.6 G/DL (ref 6–8.4)
SODIUM SERPL-SCNC: 142 MMOL/L (ref 136–145)

## 2022-07-18 PROCEDURE — 36415 COLL VENOUS BLD VENIPUNCTURE: CPT | Performed by: INTERNAL MEDICINE

## 2022-07-18 PROCEDURE — 82105 ALPHA-FETOPROTEIN SERUM: CPT | Performed by: INTERNAL MEDICINE

## 2022-07-18 PROCEDURE — 80053 COMPREHEN METABOLIC PANEL: CPT | Performed by: INTERNAL MEDICINE

## 2022-07-27 ENCOUNTER — PATIENT MESSAGE (OUTPATIENT)
Dept: FAMILY MEDICINE | Facility: CLINIC | Age: 32
End: 2022-07-27
Payer: MEDICAID

## 2022-07-28 DIAGNOSIS — F90.9 ATTENTION DEFICIT HYPERACTIVITY DISORDER (ADHD), UNSPECIFIED ADHD TYPE: ICD-10-CM

## 2022-07-28 RX ORDER — DEXTROAMPHETAMINE SACCHARATE, AMPHETAMINE ASPARTATE, DEXTROAMPHETAMINE SULFATE AND AMPHETAMINE SULFATE 3.75; 3.75; 3.75; 3.75 MG/1; MG/1; MG/1; MG/1
15 TABLET ORAL DAILY
Qty: 30 TABLET | Refills: 0 | Status: SHIPPED | OUTPATIENT
Start: 2022-07-28 | End: 2022-08-29 | Stop reason: SDUPTHER

## 2022-07-28 RX ORDER — VILAZODONE HYDROCHLORIDE 20 MG/1
1 TABLET ORAL DAILY
Qty: 30 TABLET | Refills: 0 | Status: SHIPPED | OUTPATIENT
Start: 2022-07-28 | End: 2022-08-29 | Stop reason: SDUPTHER

## 2022-08-08 ENCOUNTER — PATIENT MESSAGE (OUTPATIENT)
Dept: FAMILY MEDICINE | Facility: CLINIC | Age: 32
End: 2022-08-08
Payer: MEDICAID

## 2022-08-09 ENCOUNTER — TELEPHONE (OUTPATIENT)
Dept: FAMILY MEDICINE | Facility: CLINIC | Age: 32
End: 2022-08-09
Payer: MEDICAID

## 2022-08-09 ENCOUNTER — HOSPITAL ENCOUNTER (OUTPATIENT)
Dept: RADIOLOGY | Facility: HOSPITAL | Age: 32
Discharge: HOME | End: 2022-08-09
Attending: NURSE PRACTITIONER
Payer: MEDICAID

## 2022-08-09 ENCOUNTER — OFFICE VISIT (OUTPATIENT)
Dept: FAMILY MEDICINE | Facility: CLINIC | Age: 32
End: 2022-08-09
Payer: MEDICAID

## 2022-08-09 VITALS
TEMPERATURE: 98 F | OXYGEN SATURATION: 98 % | HEART RATE: 103 BPM | BODY MASS INDEX: 25.89 KG/M2 | HEIGHT: 69 IN | SYSTOLIC BLOOD PRESSURE: 120 MMHG | DIASTOLIC BLOOD PRESSURE: 80 MMHG | WEIGHT: 174.81 LBS

## 2022-08-09 DIAGNOSIS — R53.83 FATIGUE, UNSPECIFIED TYPE: ICD-10-CM

## 2022-08-09 DIAGNOSIS — M79.672 LEFT FOOT PAIN: ICD-10-CM

## 2022-08-09 DIAGNOSIS — M79.672 LEFT FOOT PAIN: Primary | ICD-10-CM

## 2022-08-09 PROCEDURE — 99214 PR OFFICE/OUTPT VISIT, EST, LEVL IV, 30-39 MIN: ICD-10-PCS | Mod: S$PBB,,, | Performed by: NURSE PRACTITIONER

## 2022-08-09 PROCEDURE — 3074F PR MOST RECENT SYSTOLIC BLOOD PRESSURE < 130 MM HG: ICD-10-PCS | Mod: CPTII,,, | Performed by: NURSE PRACTITIONER

## 2022-08-09 PROCEDURE — 99214 OFFICE O/P EST MOD 30 MIN: CPT | Mod: PBBFAC,PO | Performed by: NURSE PRACTITIONER

## 2022-08-09 PROCEDURE — 3008F BODY MASS INDEX DOCD: CPT | Mod: CPTII,,, | Performed by: NURSE PRACTITIONER

## 2022-08-09 PROCEDURE — 1160F RVW MEDS BY RX/DR IN RCRD: CPT | Mod: CPTII,,, | Performed by: NURSE PRACTITIONER

## 2022-08-09 PROCEDURE — 3079F PR MOST RECENT DIASTOLIC BLOOD PRESSURE 80-89 MM HG: ICD-10-PCS | Mod: CPTII,,, | Performed by: NURSE PRACTITIONER

## 2022-08-09 PROCEDURE — 99999 PR PBB SHADOW E&M-EST. PATIENT-LVL IV: ICD-10-PCS | Mod: PBBFAC,,, | Performed by: NURSE PRACTITIONER

## 2022-08-09 PROCEDURE — 73630 X-RAY EXAM OF FOOT: CPT | Mod: TC,PO,LT

## 2022-08-09 PROCEDURE — 1159F PR MEDICATION LIST DOCUMENTED IN MEDICAL RECORD: ICD-10-PCS | Mod: CPTII,,, | Performed by: NURSE PRACTITIONER

## 2022-08-09 PROCEDURE — 99214 OFFICE O/P EST MOD 30 MIN: CPT | Mod: S$PBB,,, | Performed by: NURSE PRACTITIONER

## 2022-08-09 PROCEDURE — 3074F SYST BP LT 130 MM HG: CPT | Mod: CPTII,,, | Performed by: NURSE PRACTITIONER

## 2022-08-09 PROCEDURE — 1160F PR REVIEW ALL MEDS BY PRESCRIBER/CLIN PHARMACIST DOCUMENTED: ICD-10-PCS | Mod: CPTII,,, | Performed by: NURSE PRACTITIONER

## 2022-08-09 PROCEDURE — 3008F PR BODY MASS INDEX (BMI) DOCUMENTED: ICD-10-PCS | Mod: CPTII,,, | Performed by: NURSE PRACTITIONER

## 2022-08-09 PROCEDURE — 3079F DIAST BP 80-89 MM HG: CPT | Mod: CPTII,,, | Performed by: NURSE PRACTITIONER

## 2022-08-09 PROCEDURE — 1159F MED LIST DOCD IN RCRD: CPT | Mod: CPTII,,, | Performed by: NURSE PRACTITIONER

## 2022-08-09 PROCEDURE — 73630 X-RAY EXAM OF FOOT: CPT | Mod: 26,LT,, | Performed by: RADIOLOGY

## 2022-08-09 PROCEDURE — 73630 XR FOOT COMPLETE 3 VIEW LEFT: ICD-10-PCS | Mod: 26,LT,, | Performed by: RADIOLOGY

## 2022-08-09 PROCEDURE — 99999 PR PBB SHADOW E&M-EST. PATIENT-LVL IV: CPT | Mod: PBBFAC,,, | Performed by: NURSE PRACTITIONER

## 2022-08-09 RX ORDER — DICLOFENAC SODIUM 50 MG/1
50 TABLET, DELAYED RELEASE ORAL 2 TIMES DAILY
Qty: 20 TABLET | Refills: 0 | Status: SHIPPED | OUTPATIENT
Start: 2022-08-09 | End: 2022-08-19

## 2022-08-09 NOTE — PROGRESS NOTES
Subjective:       Patient ID: Shruthi Riley is a 32 y.o. female.    Chief Complaint: Follow-up  Pt reports to clinic with chief complaint of left foot pain.  Present for several months.  Does not recall remote injury or trauma.  No swelling.  Worsens after prolonged periods of ambulation.  Reports cramping.  Pt has complained of arthralgia in multiple sites.  Did not obtain autoimmune labs.  Trial of steroids did afford relief.     Past Medical History:   Diagnosis Date    Depression     Hepatitis C     s/p Mavyret    IV drug abuse     Quit 3 years ago      Foot Injury   The incident occurred more than 1 week ago. The incident occurred at home. There was no injury mechanism. The pain is present in the left foot. The pain is at a severity of 6/10. The pain is moderate. Pertinent negatives include no loss of motion, loss of sensation, muscle weakness, numbness or tingling. She reports no foreign bodies present. The symptoms are aggravated by movement. She has tried nothing for the symptoms.     Review of Systems   Constitutional: Positive for fatigue.   HENT: Negative.    Respiratory: Negative.    Cardiovascular: Negative.    Gastrointestinal: Negative.    Genitourinary: Negative.    Musculoskeletal: Positive for arthralgias.   Neurological: Negative for tingling and numbness.   Psychiatric/Behavioral: Negative.        Objective:      Physical Exam  Vitals reviewed.   Constitutional:       Appearance: She is well-developed.   HENT:      Head: Normocephalic.      Mouth/Throat:      Pharynx: No oropharyngeal exudate.   Eyes:      Pupils: Pupils are equal, round, and reactive to light.   Neck:      Vascular: No JVD.      Trachea: No tracheal deviation.   Cardiovascular:      Rate and Rhythm: Normal rate and regular rhythm.      Heart sounds: Normal heart sounds. No murmur heard.  Pulmonary:      Effort: Pulmonary effort is normal. No respiratory distress.      Breath sounds: Normal breath sounds.   Abdominal:       General: Bowel sounds are normal. There is no distension.      Palpations: Abdomen is soft.      Tenderness: There is no abdominal tenderness.   Musculoskeletal:         General: Normal range of motion.      Cervical back: Normal range of motion.      Left foot: Bony tenderness (metatarsals ) present.   Skin:     General: Skin is warm and dry.   Neurological:      Mental Status: She is alert and oriented to person, place, and time.      Deep Tendon Reflexes: Reflexes are normal and symmetric.   Psychiatric:         Speech: Speech normal.         Behavior: Behavior normal.         Assessment:       1. Left foot pain    2. Fatigue, unspecified type        Plan:   Left foot pain  -     X-Ray Foot Complete 3 view Left; Future; Expected date: 08/09/2022  -     Ambulatory referral/consult to Podiatry; Future; Expected date: 08/16/2022  -     diclofenac (VOLTAREN) 50 MG EC tablet; Take 1 tablet (50 mg total) by mouth 2 (two) times daily. for 10 days  Dispense: 20 tablet; Refill: 0    Fatigue, unspecified type      No follow-ups on file.

## 2022-09-07 ENCOUNTER — OFFICE VISIT (OUTPATIENT)
Dept: PODIATRY | Facility: CLINIC | Age: 32
End: 2022-09-07
Payer: MEDICAID

## 2022-09-07 DIAGNOSIS — M79.672 LEFT FOOT PAIN: ICD-10-CM

## 2022-09-07 DIAGNOSIS — M77.8 EXTENSOR TENDONITIS OF FOOT: Primary | ICD-10-CM

## 2022-09-07 PROCEDURE — 99999 PR PBB SHADOW E&M-EST. PATIENT-LVL II: ICD-10-PCS | Mod: PBBFAC,,, | Performed by: PODIATRIST

## 2022-09-07 PROCEDURE — 1160F RVW MEDS BY RX/DR IN RCRD: CPT | Mod: CPTII,,, | Performed by: PODIATRIST

## 2022-09-07 PROCEDURE — 99212 OFFICE O/P EST SF 10 MIN: CPT | Mod: PBBFAC | Performed by: PODIATRIST

## 2022-09-07 PROCEDURE — 1159F PR MEDICATION LIST DOCUMENTED IN MEDICAL RECORD: ICD-10-PCS | Mod: CPTII,,, | Performed by: PODIATRIST

## 2022-09-07 PROCEDURE — 99203 PR OFFICE/OUTPT VISIT, NEW, LEVL III, 30-44 MIN: ICD-10-PCS | Mod: S$PBB,,, | Performed by: PODIATRIST

## 2022-09-07 PROCEDURE — 99999 PR PBB SHADOW E&M-EST. PATIENT-LVL II: CPT | Mod: PBBFAC,,, | Performed by: PODIATRIST

## 2022-09-07 PROCEDURE — 99203 OFFICE O/P NEW LOW 30 MIN: CPT | Mod: S$PBB,,, | Performed by: PODIATRIST

## 2022-09-07 PROCEDURE — 1159F MED LIST DOCD IN RCRD: CPT | Mod: CPTII,,, | Performed by: PODIATRIST

## 2022-09-07 PROCEDURE — 1160F PR REVIEW ALL MEDS BY PRESCRIBER/CLIN PHARMACIST DOCUMENTED: ICD-10-PCS | Mod: CPTII,,, | Performed by: PODIATRIST

## 2022-09-07 NOTE — PROGRESS NOTES
Subjective:       Patient ID: Shruthi Riley is a 32 y.o. female.    Chief Complaint: Foot Pain (Patient complains of 6/10 pain  to left dorsal foot. She states there is a knot that has been present a while and the foot feels weird at night. )    HPI: Shruthi Riley presents to the office today at the referral of Freya Newell NP with complaints of pain to the dorsal aspect of the left foot.  She reports that pain is worsened after periods ambulation and weight-bearing.  States she is in least amount of pain while she is moving but states that upon sitting thereafter, she has increased pain.  Reports that she must sleep with her foot in a plantar flexed position which relates to flexion of the knee and almost a sitting like on the foot.  Denies any recent trauma or injury.  States this is been going on for a while.  States 6/10 pain.  X-rays, previously taken, show no acute pathology.  Reports that she is able to move and wiggle the toes without complications.    Review of patient's allergies indicates:  No Known Allergies    Past Medical History:   Diagnosis Date    Depression     Hepatitis C     s/p Mavyret    IV drug abuse     Quit 3 years ago        Family History   Problem Relation Age of Onset    Heart failure Mother     Melanoma Mother     COPD Father     No Known Problems Brother     Breast cancer Neg Hx     Colon cancer Neg Hx     Ovarian cancer Neg Hx     Thrombosis Neg Hx        Social History     Socioeconomic History    Marital status:    Tobacco Use    Smoking status: Every Day     Packs/day: 0.50     Years: 15.00     Pack years: 7.50     Types: Vaping with nicotine, Cigarettes    Smokeless tobacco: Never   Substance and Sexual Activity    Alcohol use: No    Drug use: Not Currently     Comment: Hx IVDA- sober x 3 years     Sexual activity: Yes     Partners: Male     Birth control/protection: Injection     Comment: depo provera       Past Surgical History:   Procedure Laterality Date     DILATION AND CURETTAGE OF UTERUS      LAPAROSCOPIC SALPINGECTOMY Bilateral 11/13/2020    Procedure: SALPINGECTOMY, LAPAROSCOPIC;  Surgeon: ED Bueno MD;  Location: [facility];  Service: OB/GYN;  Laterality: Bilateral;    TONSILLECTOMY      TUBAL LIGATION         Review of Systems       Objective:   There were no vitals taken for this visit.    X-Ray Foot Complete 3 view Left  Narrative: EXAMINATION:  XR FOOT COMPLETE 3 VIEW LEFT    CLINICAL HISTORY:  .  Pain in left foot    TECHNIQUE:  AP, lateral and oblique views of the left foot were performed.    COMPARISON:  None    FINDINGS:  No fracture or dislocation.  No osseous erosion.  Joint spaces are intact.  Soft tissues are within normal limits.  Impression: Please see above    Electronically signed by: Dustin Santo MD  Date:    08/09/2022  Time:    10:05       Physical Exam    LOWER EXTREMITY PHYSICAL EXAMINATION  ORTHOPEDIC:  There is mild discomfort to the dorsal aspect of the left foot.  TA tendon is working appropriately without evidence of deficit or laceration.  FHL tendon is working appropriately with 5/5 muscle strength.  FDL tendon to the lesser digits appear to be intact without significant complications.  There is pain noted to the dorsal aspect of the foot at the area of the tn joint most notably to the soft tissue.  No pain with ambulation.      DERMATOLOGY: Skin is supple, dry and intact. No ecchymosis is noted. No hypertrophic skin formation. No erythema or cellulitis is noted.    NEUROLOGY: Sensation to light touch is intact. Proprioception is intact. Sensation to pin prick is intact. Deep tendon reflexes of the lower extremity are WNL.    VASCULAR: On the left foot, the dorsalis pedis pulse is 2/4 and the posterior tibial pulse is 2/4. Capillary refill time is less than 3 seconds. Hair growth is present on the dorsum of the foot and at the digits. No rubor is present. Proximal to distal temperature is warm to warm.      Assessment:     1.  Extensor tendonitis of foot    2. Left foot pain        Plan:     Extensor tendonitis of foot    Left foot pain  -     Ambulatory referral/consult to Podiatry        Thorough discussion is had with the patient today, concerning the diagnosis, its etiology, and the treatment algorithm at present.    X-rays reviewed by myself with the patient room.  No acute fracture dislocation is noted.  Agree with radiology interpretation of imaging.    Discussed pathology in etiology associated with the extensor tendinitis the left foot.  Discuss utilization anti-inflammatory medications to help with limited mobility of the foot.  Discussed possibility of boot to decrease mobility but this would place patient at increased risk of falling while at work.  As the tendons appear to be working appropriate without any deficits, patient to continue to ambulate as tolerated        No future appointments.

## 2022-09-18 ENCOUNTER — PATIENT MESSAGE (OUTPATIENT)
Dept: FAMILY MEDICINE | Facility: CLINIC | Age: 32
End: 2022-09-18
Payer: MEDICAID

## 2022-09-23 ENCOUNTER — OFFICE VISIT (OUTPATIENT)
Dept: FAMILY MEDICINE | Facility: CLINIC | Age: 32
End: 2022-09-23
Payer: MEDICAID

## 2022-09-23 DIAGNOSIS — F41.1 GAD (GENERALIZED ANXIETY DISORDER): Primary | ICD-10-CM

## 2022-09-23 DIAGNOSIS — F33.1 MODERATE EPISODE OF RECURRENT MAJOR DEPRESSIVE DISORDER: ICD-10-CM

## 2022-09-23 PROCEDURE — 99213 OFFICE O/P EST LOW 20 MIN: CPT | Mod: 95,,, | Performed by: NURSE PRACTITIONER

## 2022-09-23 PROCEDURE — 99213 PR OFFICE/OUTPT VISIT, EST, LEVL III, 20-29 MIN: ICD-10-PCS | Mod: 95,,, | Performed by: NURSE PRACTITIONER

## 2022-09-23 RX ORDER — HYDROXYZINE PAMOATE 50 MG/1
50 CAPSULE ORAL 3 TIMES DAILY PRN
Qty: 60 CAPSULE | Refills: 0 | Status: SHIPPED | OUTPATIENT
Start: 2022-09-23 | End: 2022-11-01 | Stop reason: ALTCHOICE

## 2022-09-23 RX ORDER — VILAZODONE HYDROCHLORIDE 40 MG/1
40 TABLET ORAL DAILY
Qty: 90 TABLET | Refills: 3 | Status: SHIPPED | OUTPATIENT
Start: 2022-09-23 | End: 2022-11-01

## 2022-09-28 NOTE — PROGRESS NOTES
Subjective:       Patient ID: Shruthi Riley is a 32 y.o. female.    Chief Complaint: No chief complaint on file.  The patient location is: [place]   The chief complaint leading to consultation is: anxiety    Visit type: audiovisual    Face to Face time with patient: 20  20 minutes of total time spent on the encounter, which includes face to face time and non-face to face time preparing to see the patient (eg, review of tests), Obtaining and/or reviewing separately obtained history, Documenting clinical information in the electronic or other health record, Independently interpreting results (not separately reported) and communicating results to the patient/family/caregiver, or Care coordination (not separately reported).         Each patient to whom he or she provides medical services by telemedicine is:  (1) informed of the relationship between the physician and patient and the respective role of any other health care provider with respect to management of the patient; and (2) notified that he or she may decline to receive medical services by telemedicine and may withdraw from such care at any time.    Notes:  pt reports to clinic with chief complaint of worsening anxiety.  Notes work stressors.  Recently pt also reports  told her he wants divorce.  Experiencing panic attacks.  No sleep.  History of poly substance abuse sober 6 years.      Past Medical History:   Diagnosis Date    Depression     Hepatitis C     s/p Mavyret    IV drug abuse     Quit 3 years ago       Anxiety  Presents for initial visit. Onset was 1 to 4 weeks ago. Symptoms include excessive worry, muscle tension, nervous/anxious behavior, panic and restlessness. Patient reports no chest pain, confusion or palpitations. Symptoms occur most days.       Review of Systems   Constitutional:  Negative for activity change and unexpected weight change.   HENT:  Negative for hearing loss, rhinorrhea and trouble swallowing.    Eyes:  Negative  for discharge and visual disturbance.   Respiratory:  Negative for chest tightness and wheezing.    Cardiovascular:  Negative for chest pain and palpitations.   Gastrointestinal:  Negative for blood in stool, constipation, diarrhea and vomiting.   Endocrine: Negative for polydipsia and polyuria.   Genitourinary:  Negative for difficulty urinating, dysuria, hematuria and menstrual problem.   Musculoskeletal:  Negative for arthralgias, joint swelling and neck pain.   Neurological:  Negative for weakness and headaches.   Psychiatric/Behavioral:  Negative for confusion and dysphoric mood. The patient is nervous/anxious.      Objective:      Physical Exam  HENT:      Head: Normocephalic.      Right Ear: External ear normal.      Left Ear: External ear normal.   Pulmonary:      Effort: Pulmonary effort is normal. No respiratory distress.   Skin:     Coloration: Skin is not jaundiced.   Neurological:      Mental Status: She is alert and oriented to person, place, and time.   Psychiatric:         Mood and Affect: Mood is anxious.       Assessment:       1. EDWARD (generalized anxiety disorder)    2. Moderate episode of recurrent major depressive disorder          Plan:   EDWARD (generalized anxiety disorder)  -     hydrOXYzine pamoate (VISTARIL) 50 MG Cap; Take 1 capsule (50 mg total) by mouth 3 (three) times daily as needed (anxiety).  Dispense: 60 capsule; Refill: 0  Follow up in 2 weeks  Moderate episode of recurrent major depressive disorder  -     vilazodone (VIIBRYD) 40 mg Tab tablet; Take 1 tablet (40 mg total) by mouth once daily.  Dispense: 90 tablet; Refill: 3  -     hydrOXYzine pamoate (VISTARIL) 50 MG Cap; Take 1 capsule (50 mg total) by mouth 3 (three) times daily as needed (anxiety).  Dispense: 60 capsule; Refill: 0      No follow-ups on file.

## 2022-09-29 ENCOUNTER — PATIENT MESSAGE (OUTPATIENT)
Dept: FAMILY MEDICINE | Facility: CLINIC | Age: 32
End: 2022-09-29
Payer: MEDICAID

## 2022-10-31 ENCOUNTER — PATIENT MESSAGE (OUTPATIENT)
Dept: FAMILY MEDICINE | Facility: CLINIC | Age: 32
End: 2022-10-31
Payer: MEDICAID

## 2022-11-01 ENCOUNTER — OFFICE VISIT (OUTPATIENT)
Dept: FAMILY MEDICINE | Facility: CLINIC | Age: 32
End: 2022-11-01
Payer: MEDICAID

## 2022-11-01 VITALS
HEART RATE: 102 BPM | SYSTOLIC BLOOD PRESSURE: 120 MMHG | OXYGEN SATURATION: 98 % | HEIGHT: 69 IN | BODY MASS INDEX: 24.88 KG/M2 | TEMPERATURE: 98 F | WEIGHT: 168 LBS | DIASTOLIC BLOOD PRESSURE: 80 MMHG

## 2022-11-01 DIAGNOSIS — F33.1 MODERATE EPISODE OF RECURRENT MAJOR DEPRESSIVE DISORDER: ICD-10-CM

## 2022-11-01 DIAGNOSIS — F41.1 GAD (GENERALIZED ANXIETY DISORDER): Primary | ICD-10-CM

## 2022-11-01 PROCEDURE — 3074F SYST BP LT 130 MM HG: CPT | Mod: CPTII,,, | Performed by: NURSE PRACTITIONER

## 2022-11-01 PROCEDURE — 1160F RVW MEDS BY RX/DR IN RCRD: CPT | Mod: CPTII,,, | Performed by: NURSE PRACTITIONER

## 2022-11-01 PROCEDURE — 3074F PR MOST RECENT SYSTOLIC BLOOD PRESSURE < 130 MM HG: ICD-10-PCS | Mod: CPTII,,, | Performed by: NURSE PRACTITIONER

## 2022-11-01 PROCEDURE — 1159F MED LIST DOCD IN RCRD: CPT | Mod: CPTII,,, | Performed by: NURSE PRACTITIONER

## 2022-11-01 PROCEDURE — 3008F BODY MASS INDEX DOCD: CPT | Mod: CPTII,,, | Performed by: NURSE PRACTITIONER

## 2022-11-01 PROCEDURE — 99213 OFFICE O/P EST LOW 20 MIN: CPT | Mod: PBBFAC,PO | Performed by: NURSE PRACTITIONER

## 2022-11-01 PROCEDURE — 99999 PR PBB SHADOW E&M-EST. PATIENT-LVL III: CPT | Mod: PBBFAC,,, | Performed by: NURSE PRACTITIONER

## 2022-11-01 PROCEDURE — 1160F PR REVIEW ALL MEDS BY PRESCRIBER/CLIN PHARMACIST DOCUMENTED: ICD-10-PCS | Mod: CPTII,,, | Performed by: NURSE PRACTITIONER

## 2022-11-01 PROCEDURE — 99214 PR OFFICE/OUTPT VISIT, EST, LEVL IV, 30-39 MIN: ICD-10-PCS | Mod: S$PBB,,, | Performed by: NURSE PRACTITIONER

## 2022-11-01 PROCEDURE — 3079F PR MOST RECENT DIASTOLIC BLOOD PRESSURE 80-89 MM HG: ICD-10-PCS | Mod: CPTII,,, | Performed by: NURSE PRACTITIONER

## 2022-11-01 PROCEDURE — 99999 PR PBB SHADOW E&M-EST. PATIENT-LVL III: ICD-10-PCS | Mod: PBBFAC,,, | Performed by: NURSE PRACTITIONER

## 2022-11-01 PROCEDURE — 3079F DIAST BP 80-89 MM HG: CPT | Mod: CPTII,,, | Performed by: NURSE PRACTITIONER

## 2022-11-01 PROCEDURE — 99214 OFFICE O/P EST MOD 30 MIN: CPT | Mod: S$PBB,,, | Performed by: NURSE PRACTITIONER

## 2022-11-01 PROCEDURE — 1159F PR MEDICATION LIST DOCUMENTED IN MEDICAL RECORD: ICD-10-PCS | Mod: CPTII,,, | Performed by: NURSE PRACTITIONER

## 2022-11-01 PROCEDURE — 3008F PR BODY MASS INDEX (BMI) DOCUMENTED: ICD-10-PCS | Mod: CPTII,,, | Performed by: NURSE PRACTITIONER

## 2022-11-01 RX ORDER — VENLAFAXINE HYDROCHLORIDE 37.5 MG/1
37.5 CAPSULE, EXTENDED RELEASE ORAL DAILY
Qty: 14 CAPSULE | Refills: 0 | Status: SHIPPED | OUTPATIENT
Start: 2022-11-01 | End: 2022-11-10 | Stop reason: ALTCHOICE

## 2022-11-01 RX ORDER — VILAZODONE HYDROCHLORIDE 10 MG/1
10 TABLET ORAL DAILY
Qty: 7 TABLET | Refills: 0 | Status: SHIPPED | OUTPATIENT
Start: 2022-11-01 | End: 2022-12-06

## 2022-11-01 RX ORDER — VILAZODONE HYDROCHLORIDE 20 MG/1
20 TABLET ORAL DAILY
Qty: 4 TABLET | Refills: 0 | Status: SHIPPED | OUTPATIENT
Start: 2022-11-01 | End: 2022-11-05

## 2022-11-01 NOTE — PROGRESS NOTES
"Subjective:       Patient ID: Shruthi Riley is a 32 y.o. female.    Chief Complaint: No chief complaint on file.  Pt reports to clinic to discuss worsening anxiety and depression.  Reports she has been experiencing panic attacks.  Frequent crying episodes.  Notes work stressors as trigger.  Pt has history of substance abuse, sober for 5 years.  Pt has also been treated for postpartum psychosis with multiple medications including, temazepam, seroquel, wellbutrin, trileptal, remeron, lexapro (worked "but I detoxed from it badly" was stopped due to pregnancy).  Mom with history of bipolar and manic depression.  Pt is , 2 kids, works as Manager @ [employer]    Past Medical History:   Diagnosis Date    Depression     Hepatitis C     s/p Mavyret    IV drug abuse     Quit 3 years ago       Depression  Visit Type: follow-up  Patient presents with the following symptoms: depressed mood, excessive worry, feelings of worthlessness, irritability, nervousness/anxiety and restlessness.  Frequency of symptoms: constantly   Severity: incapacitating     Review of Systems   Constitutional:  Positive for irritability.   HENT: Negative.     Respiratory: Negative.     Cardiovascular: Negative.    Gastrointestinal: Negative.    Genitourinary: Negative.    Musculoskeletal: Negative.    Skin: Negative.    Psychiatric/Behavioral:  Positive for agitation, depression and dysphoric mood. The patient is nervous/anxious.      Objective:      Physical Exam  Vitals reviewed.   HENT:      Head: Normocephalic.      Right Ear: External ear normal.      Left Ear: External ear normal.      Mouth/Throat:      Mouth: Mucous membranes are dry.   Eyes:      Extraocular Movements: Extraocular movements intact.   Cardiovascular:      Rate and Rhythm: Normal rate.      Heart sounds: Normal heart sounds.   Pulmonary:      Effort: Pulmonary effort is normal. No respiratory distress.   Musculoskeletal:         General: Normal range of motion. "   Skin:     General: Skin is warm and dry.   Neurological:      Mental Status: She is alert and oriented to person, place, and time.   Psychiatric:         Mood and Affect: Mood is anxious and depressed. Affect is tearful.       Assessment:       1. EDWARD (generalized anxiety disorder)    2. Moderate episode of recurrent major depressive disorder          Plan:   EDWARD (generalized anxiety disorder)  -denies SI or HI; will wean off viibryd per uptodate  20 mg once daily for 4 days, then 10 mg once daily for 3 days; in patients taking 20 mg/day, taper dose to 10 mg once daily for 7 days    -begin effexor follow up in 11 days, referred to psych externally to medicaid approved provider  Pt knows to contact me in interim if needed  -FMLA 6weeks  Moderate episode of recurrent major depressive disorder    Other orders  -     venlafaxine (EFFEXOR-XR) 37.5 MG 24 hr capsule; Take 1 capsule (37.5 mg total) by mouth once daily. for 14 days  Dispense: 14 capsule; Refill: 0  -     vilazodone (VIIBRYD) 20 mg Tab; Take 1 tablet (20 mg total) by mouth once daily. for 4 days  Dispense: 4 tablet; Refill: 0  -     vilazodone (VIIBRYD) 10 mg Tab tablet; Take 1 tablet (10 mg total) by mouth once daily. for 7 days  Dispense: 7 tablet; Refill: 0    Time spent: 45 minutes in face to face discussion concerning diagnosis, prognosis, review of lab and test results, benefits of treatment as well as management of disease, counseling of patient and coordination of care between various health care providers . Greater than half the time spent was used for coordination of care and counseling of patient.       No follow-ups on file.

## 2022-11-01 NOTE — PATIENT INSTRUCTIONS
Taper Viibryd:     20 mg once daily for 4 days, then 10 mg once daily for 3 days; in patients taking 20 mg/day, taper dose to 10 mg once daily for 7 days

## 2022-11-10 ENCOUNTER — OFFICE VISIT (OUTPATIENT)
Dept: FAMILY MEDICINE | Facility: CLINIC | Age: 32
End: 2022-11-10
Payer: MEDICAID

## 2022-11-10 VITALS
DIASTOLIC BLOOD PRESSURE: 70 MMHG | BODY MASS INDEX: 25.44 KG/M2 | SYSTOLIC BLOOD PRESSURE: 130 MMHG | OXYGEN SATURATION: 98 % | WEIGHT: 171.75 LBS | HEART RATE: 102 BPM | HEIGHT: 69 IN | TEMPERATURE: 97 F

## 2022-11-10 DIAGNOSIS — F41.1 GAD (GENERALIZED ANXIETY DISORDER): ICD-10-CM

## 2022-11-10 DIAGNOSIS — J20.9 ACUTE BRONCHITIS, UNSPECIFIED ORGANISM: ICD-10-CM

## 2022-11-10 DIAGNOSIS — F33.1 MODERATE EPISODE OF RECURRENT MAJOR DEPRESSIVE DISORDER: Primary | ICD-10-CM

## 2022-11-10 PROCEDURE — 1159F MED LIST DOCD IN RCRD: CPT | Mod: CPTII,,, | Performed by: NURSE PRACTITIONER

## 2022-11-10 PROCEDURE — 99213 PR OFFICE/OUTPT VISIT, EST, LEVL III, 20-29 MIN: ICD-10-PCS | Mod: S$PBB,,, | Performed by: NURSE PRACTITIONER

## 2022-11-10 PROCEDURE — 3008F BODY MASS INDEX DOCD: CPT | Mod: CPTII,,, | Performed by: NURSE PRACTITIONER

## 2022-11-10 PROCEDURE — 3008F PR BODY MASS INDEX (BMI) DOCUMENTED: ICD-10-PCS | Mod: CPTII,,, | Performed by: NURSE PRACTITIONER

## 2022-11-10 PROCEDURE — 1160F RVW MEDS BY RX/DR IN RCRD: CPT | Mod: CPTII,,, | Performed by: NURSE PRACTITIONER

## 2022-11-10 PROCEDURE — 3078F DIAST BP <80 MM HG: CPT | Mod: CPTII,,, | Performed by: NURSE PRACTITIONER

## 2022-11-10 PROCEDURE — 99213 OFFICE O/P EST LOW 20 MIN: CPT | Mod: S$PBB,,, | Performed by: NURSE PRACTITIONER

## 2022-11-10 PROCEDURE — 99999 PR PBB SHADOW E&M-EST. PATIENT-LVL III: CPT | Mod: PBBFAC,,, | Performed by: NURSE PRACTITIONER

## 2022-11-10 PROCEDURE — 99999 PR PBB SHADOW E&M-EST. PATIENT-LVL III: ICD-10-PCS | Mod: PBBFAC,,, | Performed by: NURSE PRACTITIONER

## 2022-11-10 PROCEDURE — 3075F PR MOST RECENT SYSTOLIC BLOOD PRESS GE 130-139MM HG: ICD-10-PCS | Mod: CPTII,,, | Performed by: NURSE PRACTITIONER

## 2022-11-10 PROCEDURE — 3075F SYST BP GE 130 - 139MM HG: CPT | Mod: CPTII,,, | Performed by: NURSE PRACTITIONER

## 2022-11-10 PROCEDURE — 99213 OFFICE O/P EST LOW 20 MIN: CPT | Mod: PBBFAC,PO | Performed by: NURSE PRACTITIONER

## 2022-11-10 PROCEDURE — 3078F PR MOST RECENT DIASTOLIC BLOOD PRESSURE < 80 MM HG: ICD-10-PCS | Mod: CPTII,,, | Performed by: NURSE PRACTITIONER

## 2022-11-10 PROCEDURE — 1159F PR MEDICATION LIST DOCUMENTED IN MEDICAL RECORD: ICD-10-PCS | Mod: CPTII,,, | Performed by: NURSE PRACTITIONER

## 2022-11-10 PROCEDURE — 1160F PR REVIEW ALL MEDS BY PRESCRIBER/CLIN PHARMACIST DOCUMENTED: ICD-10-PCS | Mod: CPTII,,, | Performed by: NURSE PRACTITIONER

## 2022-11-10 RX ORDER — AZITHROMYCIN 250 MG/1
TABLET, FILM COATED ORAL
Qty: 6 TABLET | Refills: 0 | Status: SHIPPED | OUTPATIENT
Start: 2022-11-10 | End: 2022-11-15

## 2022-11-10 RX ORDER — LAMOTRIGINE 25 MG/1
25 TABLET ORAL DAILY
Qty: 30 TABLET | Refills: 0 | Status: SHIPPED | OUTPATIENT
Start: 2022-11-10 | End: 2022-12-06 | Stop reason: DRUGHIGH

## 2022-11-10 RX ORDER — ALBUTEROL SULFATE 90 UG/1
2 AEROSOL, METERED RESPIRATORY (INHALATION) EVERY 6 HOURS PRN
Qty: 18 G | Refills: 0 | Status: SHIPPED | OUTPATIENT
Start: 2022-11-10 | End: 2023-11-10

## 2022-11-10 NOTE — PROGRESS NOTES
"Subjective:       Patient ID: Shruthi Riley is a 32 y.o. female.    Chief Complaint: No chief complaint on file.  Pt reports to clinic for follow up evaluation.  Worsening mood with anxiety and depression.  Pinpoints work as trigger.  Notes "high and lows".  Sleep quality is decreased.  Crying frequently.  Was not receiving relief with viibryd.  Began weaning and initiated on effexor.  Pt reports stopping effexor after 3 days.  "I was rambling and didn't make sense".  Strong FMH of mental illness: mom bipolar manic depression, brother bipolar, dad bipolar, aunt: multiple personality disorder.     Past Medical History:   Diagnosis Date    Depression     Hepatitis C     s/p Mavyret    IV drug abuse     Quit 3 years ago       HPI  Review of Systems   HENT: Negative.     Respiratory:  Positive for cough.    Cardiovascular: Negative.    Gastrointestinal: Negative.    Genitourinary: Negative.    Psychiatric/Behavioral:  Positive for agitation, behavioral problems, decreased concentration and sleep disturbance. The patient is nervous/anxious.      Objective:      Physical Exam  Vitals reviewed.   Constitutional:       Appearance: She is well-developed.   HENT:      Head: Normocephalic.      Mouth/Throat:      Pharynx: No oropharyngeal exudate.   Eyes:      Pupils: Pupils are equal, round, and reactive to light.   Neck:      Vascular: No JVD.      Trachea: No tracheal deviation.   Cardiovascular:      Rate and Rhythm: Normal rate and regular rhythm.      Heart sounds: Normal heart sounds. No murmur heard.  Pulmonary:      Effort: Pulmonary effort is normal. No respiratory distress.      Breath sounds: Normal breath sounds.   Abdominal:      General: Bowel sounds are normal. There is no distension.      Palpations: Abdomen is soft.      Tenderness: There is no abdominal tenderness.   Musculoskeletal:         General: Normal range of motion.      Cervical back: Normal range of motion.   Skin:     General: Skin is warm " and dry.   Neurological:      Mental Status: She is alert and oriented to person, place, and time.      Deep Tendon Reflexes: Reflexes are normal and symmetric.   Psychiatric:         Mood and Affect: Affect is tearful.         Speech: Speech normal.         Behavior: Behavior normal.       Assessment:       1. Moderate episode of recurrent major depressive disorder    2. EDWARD (generalized anxiety disorder)    3. Acute bronchitis, unspecified organism          Plan:   Moderate episode of recurrent major depressive disorder  -     lamoTRIgine (LAMICTAL) 25 MG tablet; Take 1 tablet (25 mg total) by mouth once daily.  Dispense: 30 tablet; Refill: 0  Will continue to try to find a psychiatrist within patient's network.  Calls have been made providers are not accepting new patients at this time   EDWARD (generalized anxiety disorder)  -     lamoTRIgine (LAMICTAL) 25 MG tablet; Take 1 tablet (25 mg total) by mouth once daily.  Dispense: 30 tablet; Refill: 0    Acute bronchitis, unspecified organism  -     albuterol (PROVENTIL/VENTOLIN HFA) 90 mcg/actuation inhaler; Inhale 2 puffs into the lungs every 6 (six) hours as needed for Wheezing. Dispense with spacer.  Dispense: 18 g; Refill: 0  -     azithromycin (Z-THADDEUS) 250 MG tablet; Take 2 tablets by mouth on day 1; Take 1 tablet by mouth on days 2-5  Dispense: 6 tablet; Refill: 0      No follow-ups on file.

## 2022-11-14 ENCOUNTER — PATIENT MESSAGE (OUTPATIENT)
Dept: FAMILY MEDICINE | Facility: CLINIC | Age: 32
End: 2022-11-14
Payer: MEDICAID

## 2022-11-15 ENCOUNTER — TELEPHONE (OUTPATIENT)
Dept: FAMILY MEDICINE | Facility: CLINIC | Age: 32
End: 2022-11-15
Payer: MEDICAID

## 2022-11-15 NOTE — TELEPHONE ENCOUNTER
Spoke to pt and told her that her paperwork was complete and she said that she would come by and pick it up.

## 2022-11-15 NOTE — TELEPHONE ENCOUNTER
----- Message from Starr Luke sent at 11/15/2022 11:46 AM CST -----  Patient requesting a call back in regards to return to work documents, Please call back at [phone].Thanks

## 2022-11-23 ENCOUNTER — PATIENT MESSAGE (OUTPATIENT)
Dept: FAMILY MEDICINE | Facility: CLINIC | Age: 32
End: 2022-11-23
Payer: MEDICAID

## 2022-11-23 DIAGNOSIS — F90.9 ATTENTION DEFICIT HYPERACTIVITY DISORDER (ADHD), UNSPECIFIED ADHD TYPE: ICD-10-CM

## 2022-11-25 RX ORDER — DEXTROAMPHETAMINE SACCHARATE, AMPHETAMINE ASPARTATE, DEXTROAMPHETAMINE SULFATE AND AMPHETAMINE SULFATE 7.5; 7.5; 7.5; 7.5 MG/1; MG/1; MG/1; MG/1
TABLET ORAL
Qty: 15 TABLET | Refills: 0 | Status: SHIPPED | OUTPATIENT
Start: 2022-11-25 | End: 2022-12-21 | Stop reason: SDUPTHER

## 2022-11-25 NOTE — TELEPHONE ENCOUNTER
Can you please send this in for an [facility] patient. They do not have Adderall 15 mg in stock but they have Adderall 30 m, in stock. I spoke with the pharmacist it can be broken in 1/2

## 2022-11-28 ENCOUNTER — PATIENT MESSAGE (OUTPATIENT)
Dept: FAMILY MEDICINE | Facility: CLINIC | Age: 32
End: 2022-11-28
Payer: MEDICAID

## 2022-12-02 ENCOUNTER — TELEPHONE (OUTPATIENT)
Dept: FAMILY MEDICINE | Facility: CLINIC | Age: 32
End: 2022-12-02
Payer: MEDICAID

## 2022-12-02 NOTE — TELEPHONE ENCOUNTER
Spoke to pt and told her that I would schedule her to come in on Monday to be seen so she could get paid.

## 2022-12-02 NOTE — TELEPHONE ENCOUNTER
----- Message from John Cardoza sent at 12/2/2022 11:47 AM CST -----  Contact: Patient  Type:  Same Day Appointment Request    Caller is requesting a same day appointment.  Caller declined first available appointment listed below.    Name of Caller:Shruthi Riley   When is the first available appointment? 2022   Symptoms: depression, break down  Best Call Back Number:[phone]   Additional Information:  pt would like a call back as soon as possible. (Pt is crying, saying she really need to be seen)

## 2022-12-05 ENCOUNTER — PATIENT MESSAGE (OUTPATIENT)
Dept: FAMILY MEDICINE | Facility: CLINIC | Age: 32
End: 2022-12-05
Payer: MEDICAID

## 2022-12-06 ENCOUNTER — OFFICE VISIT (OUTPATIENT)
Dept: FAMILY MEDICINE | Facility: CLINIC | Age: 32
End: 2022-12-06
Payer: MEDICAID

## 2022-12-06 VITALS
BODY MASS INDEX: 25.18 KG/M2 | WEIGHT: 170 LBS | DIASTOLIC BLOOD PRESSURE: 84 MMHG | HEIGHT: 69 IN | HEART RATE: 98 BPM | OXYGEN SATURATION: 98 % | SYSTOLIC BLOOD PRESSURE: 116 MMHG | TEMPERATURE: 98 F

## 2022-12-06 DIAGNOSIS — F51.01 PRIMARY INSOMNIA: ICD-10-CM

## 2022-12-06 DIAGNOSIS — F33.2 SEVERE EPISODE OF RECURRENT MAJOR DEPRESSIVE DISORDER, WITHOUT PSYCHOTIC FEATURES: Primary | ICD-10-CM

## 2022-12-06 PROCEDURE — 3079F PR MOST RECENT DIASTOLIC BLOOD PRESSURE 80-89 MM HG: ICD-10-PCS | Mod: CPTII,,, | Performed by: NURSE PRACTITIONER

## 2022-12-06 PROCEDURE — 3074F SYST BP LT 130 MM HG: CPT | Mod: CPTII,,, | Performed by: NURSE PRACTITIONER

## 2022-12-06 PROCEDURE — 1160F PR REVIEW ALL MEDS BY PRESCRIBER/CLIN PHARMACIST DOCUMENTED: ICD-10-PCS | Mod: CPTII,,, | Performed by: NURSE PRACTITIONER

## 2022-12-06 PROCEDURE — 3008F BODY MASS INDEX DOCD: CPT | Mod: CPTII,,, | Performed by: NURSE PRACTITIONER

## 2022-12-06 PROCEDURE — 99214 OFFICE O/P EST MOD 30 MIN: CPT | Mod: S$PBB,,, | Performed by: NURSE PRACTITIONER

## 2022-12-06 PROCEDURE — 3008F PR BODY MASS INDEX (BMI) DOCUMENTED: ICD-10-PCS | Mod: CPTII,,, | Performed by: NURSE PRACTITIONER

## 2022-12-06 PROCEDURE — 1159F MED LIST DOCD IN RCRD: CPT | Mod: CPTII,,, | Performed by: NURSE PRACTITIONER

## 2022-12-06 PROCEDURE — 1160F RVW MEDS BY RX/DR IN RCRD: CPT | Mod: CPTII,,, | Performed by: NURSE PRACTITIONER

## 2022-12-06 PROCEDURE — 99213 OFFICE O/P EST LOW 20 MIN: CPT | Mod: PBBFAC,PO | Performed by: NURSE PRACTITIONER

## 2022-12-06 PROCEDURE — 99999 PR PBB SHADOW E&M-EST. PATIENT-LVL III: ICD-10-PCS | Mod: PBBFAC,,, | Performed by: NURSE PRACTITIONER

## 2022-12-06 PROCEDURE — 99999 PR PBB SHADOW E&M-EST. PATIENT-LVL III: CPT | Mod: PBBFAC,,, | Performed by: NURSE PRACTITIONER

## 2022-12-06 PROCEDURE — 99214 PR OFFICE/OUTPT VISIT, EST, LEVL IV, 30-39 MIN: ICD-10-PCS | Mod: S$PBB,,, | Performed by: NURSE PRACTITIONER

## 2022-12-06 PROCEDURE — 3074F PR MOST RECENT SYSTOLIC BLOOD PRESSURE < 130 MM HG: ICD-10-PCS | Mod: CPTII,,, | Performed by: NURSE PRACTITIONER

## 2022-12-06 PROCEDURE — 3079F DIAST BP 80-89 MM HG: CPT | Mod: CPTII,,, | Performed by: NURSE PRACTITIONER

## 2022-12-06 PROCEDURE — 1159F PR MEDICATION LIST DOCUMENTED IN MEDICAL RECORD: ICD-10-PCS | Mod: CPTII,,, | Performed by: NURSE PRACTITIONER

## 2022-12-06 RX ORDER — TRAZODONE HYDROCHLORIDE 150 MG/1
150 TABLET ORAL NIGHTLY
Qty: 30 TABLET | Refills: 2 | Status: SHIPPED | OUTPATIENT
Start: 2022-12-06 | End: 2023-12-06

## 2022-12-06 RX ORDER — LAMOTRIGINE 100 MG/1
100 TABLET ORAL DAILY
Qty: 30 TABLET | Refills: 2 | Status: SHIPPED | OUTPATIENT
Start: 2022-12-06 | End: 2022-12-16 | Stop reason: ALTCHOICE
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
## 2022-12-08 ENCOUNTER — PATIENT MESSAGE (OUTPATIENT)
Dept: FAMILY MEDICINE | Facility: CLINIC | Age: 32
End: 2022-12-08
Payer: MEDICAID

## 2022-12-09 ENCOUNTER — PATIENT MESSAGE (OUTPATIENT)
Dept: FAMILY MEDICINE | Facility: CLINIC | Age: 32
End: 2022-12-09
Payer: MEDICAID

## 2022-12-14 ENCOUNTER — PATIENT MESSAGE (OUTPATIENT)
Dept: FAMILY MEDICINE | Facility: CLINIC | Age: 32
End: 2022-12-14
Payer: MEDICAID

## 2022-12-16 ENCOUNTER — OFFICE VISIT (OUTPATIENT)
Dept: FAMILY MEDICINE | Facility: CLINIC | Age: 32
End: 2022-12-16
Payer: MEDICAID

## 2022-12-16 VITALS
OXYGEN SATURATION: 99 % | WEIGHT: 173.31 LBS | HEART RATE: 116 BPM | DIASTOLIC BLOOD PRESSURE: 82 MMHG | BODY MASS INDEX: 25.67 KG/M2 | HEIGHT: 69 IN | TEMPERATURE: 98 F | SYSTOLIC BLOOD PRESSURE: 120 MMHG

## 2022-12-16 DIAGNOSIS — F33.2 SEVERE EPISODE OF RECURRENT MAJOR DEPRESSIVE DISORDER, WITHOUT PSYCHOTIC FEATURES: Primary | ICD-10-CM

## 2022-12-16 PROCEDURE — 3008F BODY MASS INDEX DOCD: CPT | Mod: CPTII,,, | Performed by: NURSE PRACTITIONER

## 2022-12-16 PROCEDURE — 99214 PR OFFICE/OUTPT VISIT, EST, LEVL IV, 30-39 MIN: ICD-10-PCS | Mod: S$PBB,,, | Performed by: NURSE PRACTITIONER

## 2022-12-16 PROCEDURE — 3008F PR BODY MASS INDEX (BMI) DOCUMENTED: ICD-10-PCS | Mod: CPTII,,, | Performed by: NURSE PRACTITIONER

## 2022-12-16 PROCEDURE — 99213 OFFICE O/P EST LOW 20 MIN: CPT | Mod: PBBFAC,PO | Performed by: NURSE PRACTITIONER

## 2022-12-16 PROCEDURE — 3074F PR MOST RECENT SYSTOLIC BLOOD PRESSURE < 130 MM HG: ICD-10-PCS | Mod: CPTII,,, | Performed by: NURSE PRACTITIONER

## 2022-12-16 PROCEDURE — 99999 PR PBB SHADOW E&M-EST. PATIENT-LVL III: CPT | Mod: PBBFAC,,, | Performed by: NURSE PRACTITIONER

## 2022-12-16 PROCEDURE — 3079F PR MOST RECENT DIASTOLIC BLOOD PRESSURE 80-89 MM HG: ICD-10-PCS | Mod: CPTII,,, | Performed by: NURSE PRACTITIONER

## 2022-12-16 PROCEDURE — 99214 OFFICE O/P EST MOD 30 MIN: CPT | Mod: S$PBB,,, | Performed by: NURSE PRACTITIONER

## 2022-12-16 PROCEDURE — 3079F DIAST BP 80-89 MM HG: CPT | Mod: CPTII,,, | Performed by: NURSE PRACTITIONER

## 2022-12-16 PROCEDURE — 99999 PR PBB SHADOW E&M-EST. PATIENT-LVL III: ICD-10-PCS | Mod: PBBFAC,,, | Performed by: NURSE PRACTITIONER

## 2022-12-16 PROCEDURE — 1159F PR MEDICATION LIST DOCUMENTED IN MEDICAL RECORD: ICD-10-PCS | Mod: CPTII,,, | Performed by: NURSE PRACTITIONER

## 2022-12-16 PROCEDURE — 3074F SYST BP LT 130 MM HG: CPT | Mod: CPTII,,, | Performed by: NURSE PRACTITIONER

## 2022-12-16 PROCEDURE — 1159F MED LIST DOCD IN RCRD: CPT | Mod: CPTII,,, | Performed by: NURSE PRACTITIONER

## 2022-12-16 RX ORDER — OXCARBAZEPINE 300 MG/1
300 TABLET, FILM COATED ORAL 2 TIMES DAILY
COMMUNITY
Start: 2022-12-13 | End: 2023-08-14

## 2022-12-16 RX ORDER — QUETIAPINE FUMARATE 25 MG/1
25 TABLET, FILM COATED ORAL NIGHTLY
COMMUNITY
Start: 2022-12-13 | End: 2024-02-08
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
## 2023-01-30 ENCOUNTER — PATIENT MESSAGE (OUTPATIENT)
Dept: FAMILY MEDICINE | Facility: CLINIC | Age: 33
End: 2023-01-30
Payer: MEDICAID

## 2023-02-01 ENCOUNTER — TELEPHONE (OUTPATIENT)
Dept: FAMILY MEDICINE | Facility: CLINIC | Age: 33
End: 2023-02-01
Payer: MEDICAID

## 2023-02-01 ENCOUNTER — OFFICE VISIT (OUTPATIENT)
Dept: FAMILY MEDICINE | Facility: CLINIC | Age: 33
End: 2023-02-01
Payer: MEDICAID

## 2023-02-01 VITALS
OXYGEN SATURATION: 98 % | TEMPERATURE: 99 F | SYSTOLIC BLOOD PRESSURE: 120 MMHG | DIASTOLIC BLOOD PRESSURE: 70 MMHG | HEART RATE: 106 BPM | HEIGHT: 69 IN | BODY MASS INDEX: 25.5 KG/M2 | WEIGHT: 172.19 LBS

## 2023-02-01 DIAGNOSIS — E01.0 THYROMEGALY: ICD-10-CM

## 2023-02-01 DIAGNOSIS — N94.10 DYSPAREUNIA IN FEMALE: ICD-10-CM

## 2023-02-01 DIAGNOSIS — F41.1 GAD (GENERALIZED ANXIETY DISORDER): ICD-10-CM

## 2023-02-01 DIAGNOSIS — F33.2 SEVERE EPISODE OF RECURRENT MAJOR DEPRESSIVE DISORDER, WITHOUT PSYCHOTIC FEATURES: Primary | ICD-10-CM

## 2023-02-01 PROCEDURE — 3074F PR MOST RECENT SYSTOLIC BLOOD PRESSURE < 130 MM HG: ICD-10-PCS | Mod: CPTII,,, | Performed by: NURSE PRACTITIONER

## 2023-02-01 PROCEDURE — 81003 URINALYSIS AUTO W/O SCOPE: CPT | Performed by: NURSE PRACTITIONER

## 2023-02-01 PROCEDURE — 1159F MED LIST DOCD IN RCRD: CPT | Mod: CPTII,,, | Performed by: NURSE PRACTITIONER

## 2023-02-01 PROCEDURE — 1159F PR MEDICATION LIST DOCUMENTED IN MEDICAL RECORD: ICD-10-PCS | Mod: CPTII,,, | Performed by: NURSE PRACTITIONER

## 2023-02-01 PROCEDURE — 99999 PR PBB SHADOW E&M-EST. PATIENT-LVL III: CPT | Mod: PBBFAC,,, | Performed by: NURSE PRACTITIONER

## 2023-02-01 PROCEDURE — 3074F SYST BP LT 130 MM HG: CPT | Mod: CPTII,,, | Performed by: NURSE PRACTITIONER

## 2023-02-01 PROCEDURE — 99214 PR OFFICE/OUTPT VISIT, EST, LEVL IV, 30-39 MIN: ICD-10-PCS | Mod: S$PBB,,, | Performed by: NURSE PRACTITIONER

## 2023-02-01 PROCEDURE — 3008F PR BODY MASS INDEX (BMI) DOCUMENTED: ICD-10-PCS | Mod: CPTII,,, | Performed by: NURSE PRACTITIONER

## 2023-02-01 PROCEDURE — 3078F PR MOST RECENT DIASTOLIC BLOOD PRESSURE < 80 MM HG: ICD-10-PCS | Mod: CPTII,,, | Performed by: NURSE PRACTITIONER

## 2023-02-01 PROCEDURE — 3078F DIAST BP <80 MM HG: CPT | Mod: CPTII,,, | Performed by: NURSE PRACTITIONER

## 2023-02-01 PROCEDURE — 99999 PR PBB SHADOW E&M-EST. PATIENT-LVL III: ICD-10-PCS | Mod: PBBFAC,,, | Performed by: NURSE PRACTITIONER

## 2023-02-01 PROCEDURE — 99213 OFFICE O/P EST LOW 20 MIN: CPT | Mod: PBBFAC,PO | Performed by: NURSE PRACTITIONER

## 2023-02-01 PROCEDURE — 99214 OFFICE O/P EST MOD 30 MIN: CPT | Mod: S$PBB,,, | Performed by: NURSE PRACTITIONER

## 2023-02-01 PROCEDURE — 3008F BODY MASS INDEX DOCD: CPT | Mod: CPTII,,, | Performed by: NURSE PRACTITIONER

## 2023-02-01 NOTE — LETTER
February 1, 2023      [facility]  [address]  [facility] [address]  Phone: [phone]  Fax: [phone]       Patient: Shruthi Riley   YOB: 1990  Date of Visit: 02/01/2023    To Whom It May Concern:    Dante Riley  was at [facility] on 02/01/2023. The patient may return to work on 2/6/23 with no restrictions. If you have any questions or concerns, or if I can be of further assistance, please do not hesitate to contact me.    Sincerely,    Sarah Mcgarry MA

## 2023-02-01 NOTE — TELEPHONE ENCOUNTER
----- Message from Rachel Lara sent at 2/1/2023  3:28 PM CST -----  .Type:  Patient Returning Call    Who Called:.Shruthi Riley   Who Left Message for Patient:JENNIE  Does the patient know what this is regarding?:  Would the patient rather a call back or a response via [facility]? Call back  Best Call Back Number:.[phone]   Additional Information:

## 2023-02-01 NOTE — TELEPHONE ENCOUNTER
Called pt back but she said that she had a missed call but it wasn't our office that left her a message.

## 2023-02-02 ENCOUNTER — APPOINTMENT (OUTPATIENT)
Dept: RADIOLOGY | Facility: HOSPITAL | Age: 33
End: 2023-02-02
Attending: NURSE PRACTITIONER
Payer: MEDICAID

## 2023-02-02 ENCOUNTER — PATIENT MESSAGE (OUTPATIENT)
Dept: FAMILY MEDICINE | Facility: CLINIC | Age: 33
End: 2023-02-02
Payer: MEDICAID

## 2023-02-02 DIAGNOSIS — E01.0 THYROMEGALY: ICD-10-CM

## 2023-02-02 LAB
BILIRUB UR QL STRIP: NEGATIVE
CLARITY UR REFRACT.AUTO: CLEAR
COLOR UR AUTO: NORMAL
GLUCOSE UR QL STRIP: NEGATIVE
HGB UR QL STRIP: NEGATIVE
KETONES UR QL STRIP: NEGATIVE
LEUKOCYTE ESTERASE UR QL STRIP: NEGATIVE
NITRITE UR QL STRIP: NEGATIVE
PH UR STRIP: 7 [PH] (ref 5–8)
PROT UR QL STRIP: NEGATIVE
SP GR UR STRIP: 1 (ref 1–1.03)
URN SPEC COLLECT METH UR: NORMAL

## 2023-02-02 PROCEDURE — 76536 US EXAM OF HEAD AND NECK: CPT | Mod: TC,PO

## 2023-02-02 PROCEDURE — 76536 US SOFT TISSUE HEAD NECK THYROID: ICD-10-PCS | Mod: 26,,, | Performed by: RADIOLOGY

## 2023-02-02 PROCEDURE — 76536 US EXAM OF HEAD AND NECK: CPT | Mod: 26,,, | Performed by: RADIOLOGY

## 2023-02-02 NOTE — PROGRESS NOTES
Subjective:       Patient ID: Shruthi Riley is a 32 y.o. female.    Chief Complaint: No chief complaint on file.  Pt reports to clinic for follow up evaluation of anxiety and depression.  Pt failed multiple out patient medications, was admitted to inpatient facility and now awaiting intake @ [facility].  Reports improvement in mood.  Now maintaining hygiene and household chores, sleeping, reports episodes of paranoia causing her to remain home, but attributed to trileptal and has stopped medication use.  No SI or HI.  Denies any arben.  Ready to return to work    Additionally, pt noted to have enlarged thyroid.  Reports was treated as child.    Additionally, pt reports recent episode of irregular menses.  Bleeding for 3 weeks.  Ceased now having lower abd pain on sexual activity.  Denies any vaginal discharge.  Ultrasound 2021 was unremarkable.      Past Medical History:   Diagnosis Date    Depression     Hepatitis C     s/p Mavyret    IV drug abuse     Quit 3 years ago       HPI  Review of Systems   Constitutional: Negative.    HENT: Negative.     Respiratory: Negative.     Cardiovascular: Negative.    Endocrine: Negative for cold intolerance, heat intolerance, polydipsia, polyphagia and polyuria.   Genitourinary:  Positive for dyspareunia.   Neurological: Negative.    Psychiatric/Behavioral:  Negative for agitation, behavioral problems and dysphoric mood. The patient is nervous/anxious.      Objective:      Physical Exam  Vitals reviewed.   Constitutional:       Appearance: She is well-developed.   HENT:      Head: Normocephalic.      Mouth/Throat:      Pharynx: No oropharyngeal exudate.   Eyes:      Pupils: Pupils are equal, round, and reactive to light.   Neck:      Thyroid: Thyromegaly present. No thyroid tenderness.      Vascular: No JVD.      Trachea: No tracheal deviation.   Cardiovascular:      Rate and Rhythm: Normal rate and regular rhythm.      Heart sounds: Normal heart sounds.  No murmur heard.  Pulmonary:      Effort: Pulmonary effort is normal. No respiratory distress.      Breath sounds: Normal breath sounds.   Abdominal:      General: Bowel sounds are normal. There is no distension.      Palpations: Abdomen is soft.      Tenderness: There is no abdominal tenderness.   Musculoskeletal:         General: Normal range of motion.      Cervical back: Normal range of motion.   Skin:     General: Skin is warm and dry.   Neurological:      Mental Status: She is alert and oriented to person, place, and time.      Deep Tendon Reflexes: Reflexes are normal and symmetric.   Psychiatric:         Speech: Speech normal.         Behavior: Behavior normal.       Assessment:       1. Severe episode of recurrent major depressive disorder, without psychotic features    2. EDWARD (generalized anxiety disorder)    3. Dyspareunia in female    4. Thyromegaly          Plan:   Severe episode of recurrent major depressive disorder, without psychotic features  -improving follow up with psychiatrist  EDWARD (generalized anxiety disorder)    Dyspareunia in female  -     POCT URINE DIPSTICK WITHOUT MICROSCOPE  -     Urinalysis; Future; Expected date: 02/01/2023  -     US Transvaginal Non OB; Future; Expected date: 02/01/2023    Thyromegaly  -     US Soft Tissue Head Neck Thyroid; Future; Expected date: 02/01/2023      No follow-ups on file.

## 2023-02-03 ENCOUNTER — APPOINTMENT (OUTPATIENT)
Dept: RADIOLOGY | Facility: HOSPITAL | Age: 33
End: 2023-02-03
Attending: NURSE PRACTITIONER
Payer: MEDICAID

## 2023-02-03 PROCEDURE — 76856 US PELVIS COMP WITH TRANSVAG NON-OB (XPD): ICD-10-PCS | Mod: 26,,, | Performed by: RADIOLOGY

## 2023-02-03 PROCEDURE — 76830 US PELVIS COMP WITH TRANSVAG NON-OB (XPD): ICD-10-PCS | Mod: 26,,, | Performed by: RADIOLOGY

## 2023-02-03 PROCEDURE — 76830 TRANSVAGINAL US NON-OB: CPT | Mod: 26,,, | Performed by: RADIOLOGY

## 2023-02-03 PROCEDURE — 76856 US EXAM PELVIC COMPLETE: CPT | Mod: TC,PO

## 2023-02-03 PROCEDURE — 76856 US EXAM PELVIC COMPLETE: CPT | Mod: 26,,, | Performed by: RADIOLOGY

## 2023-02-15 ENCOUNTER — PATIENT MESSAGE (OUTPATIENT)
Dept: FAMILY MEDICINE | Facility: CLINIC | Age: 33
End: 2023-02-15
Payer: MEDICAID

## 2023-02-16 RX ORDER — DEXTROAMPHETAMINE SACCHARATE, AMPHETAMINE ASPARTATE MONOHYDRATE, DEXTROAMPHETAMINE SULFATE AND AMPHETAMINE SULFATE 5; 5; 5; 5 MG/1; MG/1; MG/1; MG/1
20 CAPSULE, EXTENDED RELEASE ORAL EVERY MORNING
Qty: 30 CAPSULE | Refills: 0 | Status: SHIPPED | OUTPATIENT
Start: 2023-02-16 | End: 2023-03-15 | Stop reason: SDUPTHER

## 2023-02-17 ENCOUNTER — PATIENT MESSAGE (OUTPATIENT)
Dept: FAMILY MEDICINE | Facility: CLINIC | Age: 33
End: 2023-02-17
Payer: MEDICAID

## 2023-02-21 ENCOUNTER — TELEPHONE (OUTPATIENT)
Dept: FAMILY MEDICINE | Facility: CLINIC | Age: 33
End: 2023-02-21
Payer: MEDICAID

## 2023-02-21 ENCOUNTER — OFFICE VISIT (OUTPATIENT)
Dept: FAMILY MEDICINE | Facility: CLINIC | Age: 33
End: 2023-02-21
Payer: MEDICAID

## 2023-02-21 VITALS
DIASTOLIC BLOOD PRESSURE: 80 MMHG | OXYGEN SATURATION: 98 % | WEIGHT: 170.19 LBS | TEMPERATURE: 97 F | HEART RATE: 95 BPM | SYSTOLIC BLOOD PRESSURE: 120 MMHG | BODY MASS INDEX: 25.21 KG/M2 | HEIGHT: 69 IN

## 2023-02-21 DIAGNOSIS — J04.0 LARYNGITIS: Primary | ICD-10-CM

## 2023-02-21 DIAGNOSIS — Z72.0 TOBACCO ABUSE: ICD-10-CM

## 2023-02-21 PROCEDURE — 1160F PR REVIEW ALL MEDS BY PRESCRIBER/CLIN PHARMACIST DOCUMENTED: ICD-10-PCS | Mod: CPTII,,, | Performed by: NURSE PRACTITIONER

## 2023-02-21 PROCEDURE — 1159F PR MEDICATION LIST DOCUMENTED IN MEDICAL RECORD: ICD-10-PCS | Mod: CPTII,,, | Performed by: NURSE PRACTITIONER

## 2023-02-21 PROCEDURE — 3074F PR MOST RECENT SYSTOLIC BLOOD PRESSURE < 130 MM HG: ICD-10-PCS | Mod: CPTII,,, | Performed by: NURSE PRACTITIONER

## 2023-02-21 PROCEDURE — 3074F SYST BP LT 130 MM HG: CPT | Mod: CPTII,,, | Performed by: NURSE PRACTITIONER

## 2023-02-21 PROCEDURE — 1160F RVW MEDS BY RX/DR IN RCRD: CPT | Mod: CPTII,,, | Performed by: NURSE PRACTITIONER

## 2023-02-21 PROCEDURE — 3008F BODY MASS INDEX DOCD: CPT | Mod: CPTII,,, | Performed by: NURSE PRACTITIONER

## 2023-02-21 PROCEDURE — 99999 PR PBB SHADOW E&M-EST. PATIENT-LVL III: ICD-10-PCS | Mod: PBBFAC,,, | Performed by: NURSE PRACTITIONER

## 2023-02-21 PROCEDURE — 3079F DIAST BP 80-89 MM HG: CPT | Mod: CPTII,,, | Performed by: NURSE PRACTITIONER

## 2023-02-21 PROCEDURE — 3079F PR MOST RECENT DIASTOLIC BLOOD PRESSURE 80-89 MM HG: ICD-10-PCS | Mod: CPTII,,, | Performed by: NURSE PRACTITIONER

## 2023-02-21 PROCEDURE — 1159F MED LIST DOCD IN RCRD: CPT | Mod: CPTII,,, | Performed by: NURSE PRACTITIONER

## 2023-02-21 PROCEDURE — 99051 MED SERV EVE/WKEND/HOLIDAY: CPT | Mod: ,,, | Performed by: NURSE PRACTITIONER

## 2023-02-21 PROCEDURE — 99051 PR MEDICAL SERVICES, EVE/WKEND/HOLIDAY: ICD-10-PCS | Mod: ,,, | Performed by: NURSE PRACTITIONER

## 2023-02-21 PROCEDURE — 99213 PR OFFICE/OUTPT VISIT, EST, LEVL III, 20-29 MIN: ICD-10-PCS | Mod: S$PBB,,, | Performed by: NURSE PRACTITIONER

## 2023-02-21 PROCEDURE — 99213 OFFICE O/P EST LOW 20 MIN: CPT | Mod: PBBFAC,PO | Performed by: NURSE PRACTITIONER

## 2023-02-21 PROCEDURE — 99999 PR PBB SHADOW E&M-EST. PATIENT-LVL III: CPT | Mod: PBBFAC,,, | Performed by: NURSE PRACTITIONER

## 2023-02-21 PROCEDURE — 3008F PR BODY MASS INDEX (BMI) DOCUMENTED: ICD-10-PCS | Mod: CPTII,,, | Performed by: NURSE PRACTITIONER

## 2023-02-21 PROCEDURE — 99213 OFFICE O/P EST LOW 20 MIN: CPT | Mod: S$PBB,,, | Performed by: NURSE PRACTITIONER

## 2023-02-21 NOTE — PROGRESS NOTES
Subjective:       Patient ID: Shruthi Riley is a 32 y.o. female.    Chief Complaint: URI  Pt reports to clinic with chief complaint of laryngitis x2 weeks.  Denies cough, congestion PND or acid reflux.  Daily smoker 1pk/day.  Pt is also concerned about enlarged neck.  Thyroid ultrasound positive 1.2cm nodule.  Denies difficulty swallowing.      Past Medical History:   Diagnosis Date    Depression     Hepatitis C     s/p Mavyret    IV drug abuse     Quit 3 years ago       Sore Throat   This is a new problem. The current episode started 1 to 4 weeks ago. There has been no fever. The pain is at a severity of 0/10. The patient is experiencing no pain. Associated symptoms include a hoarse voice. Pertinent negatives include no congestion, coughing, shortness of breath or vomiting. She has tried nothing for the symptoms.   Review of Systems   Constitutional: Negative.    HENT:  Positive for hoarse voice and sore throat. Negative for congestion.    Respiratory:  Negative for cough and shortness of breath.    Cardiovascular: Negative.    Gastrointestinal: Negative.  Negative for vomiting.   Genitourinary: Negative.      Objective:      Physical Exam  Vitals reviewed.   HENT:      Head: Normocephalic.      Right Ear: Tympanic membrane normal.      Left Ear: Tympanic membrane normal.      Nose: Rhinorrhea present.      Mouth/Throat:      Comments: hoarseness  Eyes:      Extraocular Movements: Extraocular movements intact.   Cardiovascular:      Rate and Rhythm: Normal rate.      Heart sounds: Normal heart sounds.   Pulmonary:      Effort: Pulmonary effort is normal. No respiratory distress.      Breath sounds: Normal breath sounds.   Neurological:      Mental Status: She is alert and oriented to person, place, and time.   Psychiatric:         Behavior: Behavior normal.       Assessment:       1. Laryngitis    2. Tobacco abuse          Plan:   Laryngitis  -     Ambulatory referral/consult to ENT; Future; Expected date:  02/28/2023    Tobacco abuse      No follow-ups on file.

## 2023-02-21 NOTE — TELEPHONE ENCOUNTER
----- Message from Robert Aguero sent at 2/21/2023  8:19 AM CST -----  Contact: [phone]  Patient requesting a call back in regards to her appointment this morning. She stated she is running very late and would like to know if she can still be seen today. Please call back at [phone]. Thanks KD

## 2023-02-22 ENCOUNTER — PATIENT MESSAGE (OUTPATIENT)
Dept: OTOLARYNGOLOGY | Facility: CLINIC | Age: 33
End: 2023-02-22
Payer: MEDICAID

## 2023-02-22 DIAGNOSIS — Z72.0 TOBACCO ABUSE: ICD-10-CM

## 2023-02-22 DIAGNOSIS — J04.0 LARYNGITIS: Primary | ICD-10-CM

## 2023-03-28 ENCOUNTER — PATIENT MESSAGE (OUTPATIENT)
Dept: FAMILY MEDICINE | Facility: CLINIC | Age: 33
End: 2023-03-28
Payer: MEDICAID

## 2023-04-11 ENCOUNTER — PATIENT MESSAGE (OUTPATIENT)
Dept: FAMILY MEDICINE | Facility: CLINIC | Age: 33
End: 2023-04-11
Payer: MEDICAID

## 2023-04-26 ENCOUNTER — OFFICE VISIT (OUTPATIENT)
Dept: OTOLARYNGOLOGY | Facility: CLINIC | Age: 33
End: 2023-04-26
Payer: MEDICAID

## 2023-04-26 VITALS
BODY MASS INDEX: 24.96 KG/M2 | DIASTOLIC BLOOD PRESSURE: 82 MMHG | SYSTOLIC BLOOD PRESSURE: 121 MMHG | HEART RATE: 79 BPM | WEIGHT: 169 LBS

## 2023-04-26 DIAGNOSIS — J38.1 REINKE'S EDEMA OF VOCAL FOLDS: ICD-10-CM

## 2023-04-26 DIAGNOSIS — E04.9 ENLARGED THYROID: Primary | ICD-10-CM

## 2023-04-26 DIAGNOSIS — Z72.0 TOBACCO ABUSE: ICD-10-CM

## 2023-04-26 PROBLEM — J04.0 LARYNGITIS: Status: ACTIVE | Noted: 2023-04-26

## 2023-04-26 PROCEDURE — 99999 PR PBB SHADOW E&M-EST. PATIENT-LVL III: ICD-10-PCS | Mod: PBBFAC,,, | Performed by: NURSE PRACTITIONER

## 2023-04-26 PROCEDURE — 3074F SYST BP LT 130 MM HG: CPT | Mod: CPTII,,, | Performed by: NURSE PRACTITIONER

## 2023-04-26 PROCEDURE — 31575 DIAGNOSTIC LARYNGOSCOPY: CPT | Mod: S$PBB,,, | Performed by: NURSE PRACTITIONER

## 2023-04-26 PROCEDURE — 99203 OFFICE O/P NEW LOW 30 MIN: CPT | Mod: S$PBB,25,, | Performed by: NURSE PRACTITIONER

## 2023-04-26 PROCEDURE — 3074F PR MOST RECENT SYSTOLIC BLOOD PRESSURE < 130 MM HG: ICD-10-PCS | Mod: CPTII,,, | Performed by: NURSE PRACTITIONER

## 2023-04-26 PROCEDURE — 99213 OFFICE O/P EST LOW 20 MIN: CPT | Mod: PBBFAC,25 | Performed by: NURSE PRACTITIONER

## 2023-04-26 PROCEDURE — 1159F MED LIST DOCD IN RCRD: CPT | Mod: CPTII,,, | Performed by: NURSE PRACTITIONER

## 2023-04-26 PROCEDURE — 99203 PR OFFICE/OUTPT VISIT, NEW, LEVL III, 30-44 MIN: ICD-10-PCS | Mod: S$PBB,25,, | Performed by: NURSE PRACTITIONER

## 2023-04-26 PROCEDURE — 3079F DIAST BP 80-89 MM HG: CPT | Mod: CPTII,,, | Performed by: NURSE PRACTITIONER

## 2023-04-26 PROCEDURE — 3008F BODY MASS INDEX DOCD: CPT | Mod: CPTII,,, | Performed by: NURSE PRACTITIONER

## 2023-04-26 PROCEDURE — 1159F PR MEDICATION LIST DOCUMENTED IN MEDICAL RECORD: ICD-10-PCS | Mod: CPTII,,, | Performed by: NURSE PRACTITIONER

## 2023-04-26 PROCEDURE — 31575 DIAGNOSTIC LARYNGOSCOPY: CPT | Mod: PBBFAC | Performed by: NURSE PRACTITIONER

## 2023-04-26 PROCEDURE — 31575 PR LARYNGOSCOPY, FLEXIBLE; DIAGNOSTIC: ICD-10-PCS | Mod: S$PBB,,, | Performed by: NURSE PRACTITIONER

## 2023-04-26 PROCEDURE — 3079F PR MOST RECENT DIASTOLIC BLOOD PRESSURE 80-89 MM HG: ICD-10-PCS | Mod: CPTII,,, | Performed by: NURSE PRACTITIONER

## 2023-04-26 PROCEDURE — 99999 PR PBB SHADOW E&M-EST. PATIENT-LVL III: CPT | Mod: PBBFAC,,, | Performed by: NURSE PRACTITIONER

## 2023-04-26 PROCEDURE — 3008F PR BODY MASS INDEX (BMI) DOCUMENTED: ICD-10-PCS | Mod: CPTII,,, | Performed by: NURSE PRACTITIONER

## 2023-04-26 RX ORDER — FLUOXETINE HYDROCHLORIDE 20 MG/1
20 CAPSULE ORAL DAILY
COMMUNITY

## 2023-04-26 NOTE — ASSESSMENT & PLAN NOTE
Worse on right. Discussed smoking cessation. She is willing to work on this. Questions answered. RTC as needed.

## 2023-04-26 NOTE — PROGRESS NOTES
Subjective     Patient ID: Shruthi Riley is a 32 y.o. female.    Chief Complaint: Laryngitis    HPI  Shruthi Riley is a 32 year old female who was referred to the head and neck clinic for hoarse voice. She has had a hoarse voice for over a year. It comes and goes. Recently it was worse but improved wihtout intervention. She has no throat pain or odynohagia. There is occasional dysphagia. She has an enlarged thyroid gland and thyroid nodules, none of which meet criteria for FNA.  She has MURRAY as a child for Grave's disease. She is a current smoker.    Past Medical History:   Diagnosis Date    Depression     Hepatitis C     s/p Mavyret    IV drug abuse     Quit 3 years ago        Past Surgical History:   Procedure Laterality Date    DILATION AND CURETTAGE OF UTERUS      LAPAROSCOPIC SALPINGECTOMY Bilateral 11/13/2020    Procedure: SALPINGECTOMY, LAPAROSCOPIC;  Surgeon: ED Bueno MD;  Location: [facility];  Service: OB/GYN;  Laterality: Bilateral;    TONSILLECTOMY      TUBAL LIGATION           Current Outpatient Medications:     dextroamphetamine-amphetamine (ADDERALL XR) 20 MG 24 hr capsule, Take 1 capsule (20 mg total) by mouth every morning., Disp: 30 capsule, Rfl: 0    QUEtiapine (SEROQUEL) 25 MG Tab, Take 25 mg by mouth every evening., Disp: , Rfl:     albuterol (PROVENTIL/VENTOLIN HFA) 90 mcg/actuation inhaler, Inhale 2 puffs into the lungs every 6 (six) hours as needed for Wheezing. Dispense with spacer. (Patient not taking: Reported on 4/26/2023), Disp: 18 g, Rfl: 0    FLUoxetine 20 MG capsule, Take 20 mg by mouth once daily., Disp: , Rfl:     fluticasone propionate (FLONASE) 50 mcg/actuation nasal spray, 2 sprays (100 mcg total) by Each Nostril route once daily. (Patient not taking: Reported on 4/26/2023), Disp: 16 g, Rfl: 0    levocetirizine (XYZAL) 5 MG tablet, Take 1 tablet (5 mg total) by mouth every evening., Disp: 90 tablet, Rfl: 3    OXcarbazepine (TRILEPTAL) 300 MG Tab, Take 300 mg by  mouth 2 (two) times daily., Disp: , Rfl:     traZODone (DESYREL) 150 MG tablet, Take 1 tablet (150 mg total) by mouth every evening. (Patient not taking: Reported on 4/26/2023), Disp: 30 tablet, Rfl: 2    valACYclovir (VALTREX) 1000 MG tablet, Take 1,000 mg by mouth 2 (two) times daily., Disp: , Rfl:     Review of patient's allergies indicates:  No Known Allergies    Social History     Socioeconomic History    Marital status:    Tobacco Use    Smoking status: Every Day     Packs/day: 0.50     Years: 15.00     Pack years: 7.50     Types: Vaping with nicotine, Cigarettes    Smokeless tobacco: Never   Substance and Sexual Activity    Alcohol use: No    Drug use: Not Currently     Comment: Hx IVDA- sober x 3 years     Sexual activity: Yes     Partners: Male     Birth control/protection: Injection     Comment: depo provera       Family History   Problem Relation Age of Onset    Heart failure Mother     Melanoma Mother     COPD Father     No Known Problems Brother     Breast cancer Neg Hx     Colon cancer Neg Hx     Ovarian cancer Neg Hx     Thrombosis Neg Hx            Review of Systems   Constitutional:  Positive for appetite change.   HENT:  Positive for facial swelling, trouble swallowing and voice change.    Eyes:  Positive for photophobia.   Respiratory:  Positive for wheezing.    Cardiovascular: Negative.    Gastrointestinal: Negative.    Endocrine: Positive for heat intolerance.   Musculoskeletal:  Positive for neck pain.   Integumentary:  Positive for rash.   Neurological:  Positive for dizziness, light-headedness and headaches.   Psychiatric/Behavioral:  Positive for decreased concentration and sleep disturbance. The patient is nervous/anxious.         Objective     Physical Exam  Vitals reviewed.   Constitutional:       General: She is not in acute distress.     Appearance: Normal appearance. She is well-developed. She is not ill-appearing or diaphoretic.   HENT:      Head: Normocephalic and atraumatic.       Jaw: No trismus.      Right Ear: Hearing and external ear normal.      Left Ear: Hearing and external ear normal.      Nose: Nose normal. No nasal deformity, mucosal edema or rhinorrhea.      Right Sinus: No maxillary sinus tenderness or frontal sinus tenderness.      Left Sinus: No maxillary sinus tenderness or frontal sinus tenderness.      Mouth/Throat:      Lips: Pink. No lesions.      Mouth: Mucous membranes are moist. Mucous membranes are not pale, not dry and not cyanotic. No oral lesions.      Dentition: Normal dentition. Does not have dentures. No dental caries.      Tongue: No lesions. Tongue does not deviate from midline.      Palate: No mass and lesions.      Pharynx: Oropharynx is clear. Uvula midline. No pharyngeal swelling, oropharyngeal exudate, posterior oropharyngeal erythema or uvula swelling.      Tonsils: No tonsillar abscesses.      Comments: Procedure: Flexible laryngoscopy  In order to fully examine the upper aerodigestive tract, including the larynx, in a patient with a hyperactive gag reflex, flexible endoscopy is required.  After explaining the procedure and obtaining verbal consent, a timeout was performed with the patient's participation according to the universal protocol. Both nasal cavities were anesthetized with 4% Xylocaine spray mixed with Denys-Synephrine. The flexible laryngoscope (#[id number]) was inserted into the nasal cavity and advanced to visualize the nasal cavity, nasopharynx, the posterior oropharynx, hypopharynx, and the endolarynx with the above findings noted. The scope was removed and the procedure terminated. The patient tolerated this procedure well without apparent complication.      FINDINGS  Nasopharynx - the torus is clear. There are no lesions of the posterior wall.   Oropharynx - no lesions of the tongue base. There is no obvious fullness or asymmetry.  Hypopharynx - there are no lesions of the pyriform sinuses or postcricoid region   Larynx - there are no  lesions of the supraglottic or glottic larynx. There is Sujata's edema bilaterally, worse on right. Vocal fold mobility is normal with complete closure.     Eyes:      General: No scleral icterus.        Right eye: No discharge.         Left eye: No discharge.      Conjunctiva/sclera: Conjunctivae normal.   Neck:      Thyroid: Thyromegaly present. No thyroid mass.      Vascular: No JVD.      Trachea: Trachea and phonation normal. No tracheal tenderness or tracheal deviation.      Comments: Salivary glands - there are no lesions or asymmetric findings in the submandibular or parotid glands    Cardiovascular:      Rate and Rhythm: Normal rate.   Pulmonary:      Effort: Pulmonary effort is normal. No respiratory distress.      Breath sounds: No stridor.   Musculoskeletal:      Cervical back: Normal range of motion and neck supple.   Lymphadenopathy:      Head:      Right side of head: No submental, submandibular, tonsillar or preauricular adenopathy.      Left side of head: No submental, submandibular, tonsillar or preauricular adenopathy.      Cervical: No cervical adenopathy.   Skin:     General: Skin is warm and dry.      Coloration: Skin is not pale.      Findings: No erythema or rash.   Neurological:      General: No focal deficit present.      Mental Status: She is alert and oriented to person, place, and time.      Cranial Nerves: No cranial nerve deficit.   Psychiatric:         Mood and Affect: Mood normal.         Behavior: Behavior normal. Behavior is cooperative.         Thought Content: Thought content normal.        Assessment and Plan     Problem List Items Addressed This Visit          ENT    Sujata's edema of vocal folds     Worse on right. Discussed smoking cessation. She is willing to work on this. Questions answered. RTC as needed.              Endocrine    Enlarged thyroid - Primary     Nodules do not meet criteria for biopsy. Follow up with endocrine/PCP.           Relevant Orders    TSH    T4,  Free       Other    Tobacco abuse    Relevant Orders    Ambulatory referral/consult to Smoking Cessation Program

## 2023-05-11 ENCOUNTER — PATIENT MESSAGE (OUTPATIENT)
Dept: FAMILY MEDICINE | Facility: CLINIC | Age: 33
End: 2023-05-11
Payer: MEDICAID

## 2023-05-15 RX ORDER — DEXTROAMPHETAMINE SACCHARATE, AMPHETAMINE ASPARTATE MONOHYDRATE, DEXTROAMPHETAMINE SULFATE AND AMPHETAMINE SULFATE 5; 5; 5; 5 MG/1; MG/1; MG/1; MG/1
20 CAPSULE, EXTENDED RELEASE ORAL EVERY MORNING
Qty: 30 CAPSULE | Refills: 0 | Status: SHIPPED | OUTPATIENT
Start: 2023-05-15 | End: 2023-06-14 | Stop reason: SDUPTHER

## 2023-06-05 ENCOUNTER — TELEPHONE (OUTPATIENT)
Dept: SMOKING CESSATION | Facility: CLINIC | Age: 33
End: 2023-06-05
Payer: MEDICAID

## 2023-06-05 NOTE — TELEPHONE ENCOUNTER
Attempted to call patient to reschedule their smoking cessation appointment. The mailbox was full and had to leave a test message with my contact information to reschedule the appointment. Elena Ybarra [phone].

## 2023-06-23 ENCOUNTER — PATIENT MESSAGE (OUTPATIENT)
Dept: FAMILY MEDICINE | Facility: CLINIC | Age: 33
End: 2023-06-23
Payer: MEDICAID

## 2023-06-26 ENCOUNTER — CLINICAL SUPPORT (OUTPATIENT)
Dept: SMOKING CESSATION | Facility: CLINIC | Age: 33
End: 2023-06-26

## 2023-06-26 VITALS
OXYGEN SATURATION: 95 % | HEART RATE: 71 BPM | BODY MASS INDEX: 25.03 KG/M2 | SYSTOLIC BLOOD PRESSURE: 116 MMHG | DIASTOLIC BLOOD PRESSURE: 83 MMHG | HEIGHT: 69 IN | WEIGHT: 169 LBS

## 2023-06-26 DIAGNOSIS — F17.200 NICOTINE DEPENDENCE: Primary | ICD-10-CM

## 2023-06-26 PROCEDURE — 99999 PR PBB SHADOW E&M-EST. PATIENT-LVL III: ICD-10-PCS | Mod: PBBFAC,,,

## 2023-06-26 PROCEDURE — 99404 PR PREVENT COUNSEL,INDIV,60 MIN: ICD-10-PCS | Mod: S$GLB,,,

## 2023-06-26 PROCEDURE — 99404 PREV MED CNSL INDIV APPRX 60: CPT | Mod: S$GLB,,,

## 2023-06-26 PROCEDURE — 99999 PR PBB SHADOW E&M-EST. PATIENT-LVL III: CPT | Mod: PBBFAC,,,

## 2023-06-26 RX ORDER — IBUPROFEN 200 MG
1 TABLET ORAL DAILY
Qty: 28 PATCH | Refills: 0 | Status: SHIPPED | OUTPATIENT
Start: 2023-06-26

## 2023-06-26 RX ORDER — MICONAZOLE NITRATE 2 %
2 CREAM (GRAM) TOPICAL
Qty: 100 EACH | Refills: 0 | Status: SHIPPED | OUTPATIENT
Start: 2023-06-26

## 2023-06-26 NOTE — Clinical Note
The patient states she smokes about 18 cigarettes per day. This is her fist real attempt to quit smoking. She was able to quit while she was pregnant. Per Smokerlyzer CO 24 ppm, last smoked 45 minutes prior to meeting. She is 6 years sober, and she has been working on her mental health and is doing well currently. She will begin using 21 mg patches and 2 mg gum (if covered by insurance) she will check with NP on Friday about Chantix. She has used Wellbutrin in the past with no success. She is highly motivated to quit smoking. FTND of 8 indicates a high level of tobacco/nicotine dependency; CESD of 25 is preceived as low level of mental distress or depression at this time. Will monitor.

## 2023-06-30 ENCOUNTER — HOSPITAL ENCOUNTER (OUTPATIENT)
Dept: RADIOLOGY | Facility: HOSPITAL | Age: 33
Discharge: HOME | End: 2023-06-30
Attending: NURSE PRACTITIONER
Payer: MEDICAID

## 2023-06-30 ENCOUNTER — OFFICE VISIT (OUTPATIENT)
Dept: FAMILY MEDICINE | Facility: CLINIC | Age: 33
End: 2023-06-30
Payer: MEDICAID

## 2023-06-30 VITALS
HEIGHT: 69 IN | BODY MASS INDEX: 25.18 KG/M2 | DIASTOLIC BLOOD PRESSURE: 70 MMHG | OXYGEN SATURATION: 97 % | HEART RATE: 101 BPM | TEMPERATURE: 98 F | SYSTOLIC BLOOD PRESSURE: 130 MMHG | WEIGHT: 170 LBS

## 2023-06-30 DIAGNOSIS — R06.2 WHEEZING: ICD-10-CM

## 2023-06-30 DIAGNOSIS — J20.9 ACUTE BRONCHITIS, UNSPECIFIED ORGANISM: ICD-10-CM

## 2023-06-30 DIAGNOSIS — J10.1 INFLUENZA B: ICD-10-CM

## 2023-06-30 DIAGNOSIS — J01.40 ACUTE NON-RECURRENT PANSINUSITIS: Primary | ICD-10-CM

## 2023-06-30 DIAGNOSIS — Z72.0 TOBACCO ABUSE: ICD-10-CM

## 2023-06-30 LAB
CTP QC/QA: YES
CTP QC/QA: YES
POC MOLECULAR INFLUENZA A AGN: NEGATIVE
POC MOLECULAR INFLUENZA B AGN: POSITIVE
SARS-COV-2 RDRP RESP QL NAA+PROBE: NEGATIVE

## 2023-06-30 PROCEDURE — 3078F PR MOST RECENT DIASTOLIC BLOOD PRESSURE < 80 MM HG: ICD-10-PCS | Mod: CPTII,,, | Performed by: NURSE PRACTITIONER

## 2023-06-30 PROCEDURE — 1160F PR REVIEW ALL MEDS BY PRESCRIBER/CLIN PHARMACIST DOCUMENTED: ICD-10-PCS | Mod: CPTII,,, | Performed by: NURSE PRACTITIONER

## 2023-06-30 PROCEDURE — 3008F PR BODY MASS INDEX (BMI) DOCUMENTED: ICD-10-PCS | Mod: CPTII,,, | Performed by: NURSE PRACTITIONER

## 2023-06-30 PROCEDURE — 1159F MED LIST DOCD IN RCRD: CPT | Mod: CPTII,,, | Performed by: NURSE PRACTITIONER

## 2023-06-30 PROCEDURE — 99999 PR PBB SHADOW E&M-EST. PATIENT-LVL IV: CPT | Mod: PBBFAC,,, | Performed by: NURSE PRACTITIONER

## 2023-06-30 PROCEDURE — 3008F BODY MASS INDEX DOCD: CPT | Mod: CPTII,,, | Performed by: NURSE PRACTITIONER

## 2023-06-30 PROCEDURE — 99214 OFFICE O/P EST MOD 30 MIN: CPT | Mod: S$PBB,,, | Performed by: NURSE PRACTITIONER

## 2023-06-30 PROCEDURE — 99999 PR PBB SHADOW E&M-EST. PATIENT-LVL IV: ICD-10-PCS | Mod: PBBFAC,,, | Performed by: NURSE PRACTITIONER

## 2023-06-30 PROCEDURE — 71046 X-RAY EXAM CHEST 2 VIEWS: CPT | Mod: TC,PO

## 2023-06-30 PROCEDURE — 1159F PR MEDICATION LIST DOCUMENTED IN MEDICAL RECORD: ICD-10-PCS | Mod: CPTII,,, | Performed by: NURSE PRACTITIONER

## 2023-06-30 PROCEDURE — 3075F SYST BP GE 130 - 139MM HG: CPT | Mod: CPTII,,, | Performed by: NURSE PRACTITIONER

## 2023-06-30 PROCEDURE — 3075F PR MOST RECENT SYSTOLIC BLOOD PRESS GE 130-139MM HG: ICD-10-PCS | Mod: CPTII,,, | Performed by: NURSE PRACTITIONER

## 2023-06-30 PROCEDURE — 3078F DIAST BP <80 MM HG: CPT | Mod: CPTII,,, | Performed by: NURSE PRACTITIONER

## 2023-06-30 PROCEDURE — 71046 XR CHEST PA AND LATERAL: ICD-10-PCS | Mod: 26,,, | Performed by: RADIOLOGY

## 2023-06-30 PROCEDURE — 87635 SARS-COV-2 COVID-19 AMP PRB: CPT | Mod: PBBFAC,PO | Performed by: NURSE PRACTITIONER

## 2023-06-30 PROCEDURE — 99214 OFFICE O/P EST MOD 30 MIN: CPT | Mod: PBBFAC,25,PO | Performed by: NURSE PRACTITIONER

## 2023-06-30 PROCEDURE — 99214 PR OFFICE/OUTPT VISIT, EST, LEVL IV, 30-39 MIN: ICD-10-PCS | Mod: S$PBB,,, | Performed by: NURSE PRACTITIONER

## 2023-06-30 PROCEDURE — 71046 X-RAY EXAM CHEST 2 VIEWS: CPT | Mod: 26,,, | Performed by: RADIOLOGY

## 2023-06-30 PROCEDURE — 1160F RVW MEDS BY RX/DR IN RCRD: CPT | Mod: CPTII,,, | Performed by: NURSE PRACTITIONER

## 2023-06-30 PROCEDURE — 87502 INFLUENZA DNA AMP PROBE: CPT | Mod: PBBFAC,PO | Performed by: NURSE PRACTITIONER

## 2023-06-30 RX ORDER — AMOXICILLIN AND CLAVULANATE POTASSIUM 875; 125 MG/1; MG/1
1 TABLET, FILM COATED ORAL EVERY 12 HOURS
Qty: 20 TABLET | Refills: 0 | Status: SHIPPED | OUTPATIENT
Start: 2023-06-30 | End: 2023-10-25

## 2023-06-30 RX ORDER — OSELTAMIVIR PHOSPHATE 75 MG/1
75 CAPSULE ORAL 2 TIMES DAILY
Qty: 10 CAPSULE | Refills: 0 | Status: SHIPPED | OUTPATIENT
Start: 2023-06-30 | End: 2023-07-05

## 2023-06-30 RX ORDER — FLUOXETINE HYDROCHLORIDE 40 MG/1
40 CAPSULE ORAL DAILY
COMMUNITY
Start: 2023-06-20 | End: 2024-02-08

## 2023-06-30 RX ORDER — ALBUTEROL SULFATE 90 UG/1
2 AEROSOL, METERED RESPIRATORY (INHALATION) EVERY 6 HOURS PRN
Qty: 18 G | Refills: 0 | Status: SHIPPED | OUTPATIENT
Start: 2023-06-30 | End: 2024-06-29

## 2023-06-30 NOTE — PROGRESS NOTES
CC:   Chief Complaint   Patient presents with    Follow-up     HPI: This is a new problem.   Shruthi Riley is a 33 y.o. female with a complaint of URI.  The current episode started in the past 7 days.   The problem has been gradually worsening.   Associated symptoms included fever, chills, nasal congestion, cough, wheezing.    Pertinent negatives include dyspnea   Treatments tried: fever began 1 day ago.  Daily smoker     [unfilled]  Outpatient Medications Prior to Visit   Medication Sig Dispense Refill    dextroamphetamine-amphetamine (ADDERALL XR) 20 MG 24 hr capsule Take 1 capsule (20 mg total) by mouth every morning. 30 capsule 0    FLUoxetine 20 MG capsule Take 20 mg by mouth once daily.      FLUoxetine 40 MG capsule Take 40 mg by mouth once daily.      fluticasone propionate (FLONASE) 50 mcg/actuation nasal spray 2 sprays (100 mcg total) by Each Nostril route once daily. 16 g 0    nicotine (NICODERM CQ) 21 mg/24 hr Place 1 patch onto the skin once daily. [place] with Medicaid 28 patch 0    nicotine, polacrilex, (NICORETTE) 2 mg Gum Take 1 each (2 mg total) by mouth as needed (as needed). Fill with Medicaid; can change amount according to plan.  Dispense CINNAMON flavor please. Can take 1-2 per hour in place of a cigarette for breakthrough cravings. 100 each 0    OXcarbazepine (TRILEPTAL) 300 MG Tab Take 300 mg by mouth 2 (two) times daily.      QUEtiapine (SEROQUEL) 25 MG Tab Take 25 mg by mouth every evening.      traZODone (DESYREL) 150 MG tablet Take 1 tablet (150 mg total) by mouth every evening. 30 tablet 2    valACYclovir (VALTREX) 1000 MG tablet Take 1,000 mg by mouth 2 (two) times daily.      albuterol (PROVENTIL/VENTOLIN HFA) 90 mcg/actuation inhaler Inhale 2 puffs into the lungs every 6 (six) hours as needed for Wheezing. Dispense with spacer. 18 g 0    levocetirizine (XYZAL) 5 MG tablet Take 1 tablet (5 mg total) by mouth every evening. 90 tablet 3     No facility-administered medications prior  "to visit.        Physical Exam   /70   Pulse 101   Temp 97.6 °F (36.4 °C)   Ht 5' 9" (1.753 m)   Wt 77.1 kg (169 lb 15.6 oz)   LMP 06/30/2023   SpO2 97%   BMI 25.10 kg/m²   Constitutional: The patient appears well-developed and well-nourished.   Head: Normocephalic and atraumatic.   Right Ear: Tympanic membrane and ear canal normal. No drainage, swelling or tenderness. Tympanic membrane is not injected, not erythematous and not bulging.   Left Ear: Ear canal normal. No drainage, swelling or tenderness. Tympanic membrane is not injected, not erythematous and not bulging.   Nose: Mucosal edema and rhinorrhea present.   Mouth/Throat: Uvula is midline. Posterior oropharyngeal erythema present. No oropharyngeal exudate.        THE MUCOSA IS BOGGY AND ERYTHEMATOUS.     Eyes: Conjunctivae normal and lids are normal. Pupils are equal, round, and reactive to light. Right eye exhibits no discharge. Left eye exhibits no discharge. Right eye exhibits normal extraocular motion. Left eye exhibits normal extraocular motion.   Neck: Trachea normal and normal range of motion. Neck supple. No tracheal tenderness present. No mass and no thyromegaly present.   Cardiovascular: Normal rate, regular rhythm, S1 normal, S2 normal and normal heart sounds.  Exam reveals no gallop, no S3, no S4 and no friction rub.    No murmur heard.  Pulmonary/Chest: Effort normal and breath sounds normal. No stridor. Not tachypneic. No respiratory distress. The patient has no wheezes. The patient has no rhonchi. The patient has no rales.   Skin: The patient is not diaphoretic.     Encounter Diagnoses   Name Primary?    Acute non-recurrent pansinusitis Yes    Wheezing     Acute bronchitis, unspecified organism     Tobacco abuse     Influenza B        PLAN:    Shruthi was seen today for follow-up.    Diagnoses and all orders for this visit:    Acute non-recurrent pansinusitis  -     amoxicillin-clavulanate 875-125mg (AUGMENTIN) 875-125 mg per " tablet; Take 1 tablet by mouth every 12 (twelve) hours.  -     POCT Influenza A/B Molecular  -     POCT COVID-19 Rapid Screening    Wheezing  -     X-Ray Chest PA And Lateral; Future  -     albuterol (PROVENTIL/VENTOLIN HFA) 90 mcg/actuation inhaler; Inhale 2 puffs into the lungs every 6 (six) hours as needed for Wheezing.    Acute bronchitis, unspecified organism    Tobacco abuse  Smoking cessation   Influenza B      Medications Ordered This Encounter   Medications    albuterol (PROVENTIL/VENTOLIN HFA) 90 mcg/actuation inhaler     Sig: Inhale 2 puffs into the lungs every 6 (six) hours as needed for Wheezing.     Dispense:  18 g     Refill:  0    amoxicillin-clavulanate 875-125mg (AUGMENTIN) 875-125 mg per tablet     Sig: Take 1 tablet by mouth every 12 (twelve) hours.     Dispense:  20 tablet     Refill:  0     Orders Placed This Encounter   Procedures    X-Ray Chest PA And Lateral     Standing Status:   Future     Number of Occurrences:   1     Standing Expiration Date:   6/30/2024     Order Specific Question:   May the Radiologist modify the order per protocol to meet the clinical needs of the patient?     Answer:   Yes     Order Specific Question:   Release to patient     Answer:   Immediate    POCT Influenza A/B Molecular    POCT COVID-19 Rapid Screening     Order Specific Question:   Is the patient symptomatic?     Answer:   Yes     RTC if symptoms are worsening or changing significantly or if not improved by the end of therapy.

## 2023-07-02 ENCOUNTER — PATIENT MESSAGE (OUTPATIENT)
Dept: FAMILY MEDICINE | Facility: CLINIC | Age: 33
End: 2023-07-02
Payer: MEDICAID

## 2023-07-03 ENCOUNTER — PATIENT MESSAGE (OUTPATIENT)
Dept: FAMILY MEDICINE | Facility: CLINIC | Age: 33
End: 2023-07-03
Payer: MEDICAID

## 2023-07-06 ENCOUNTER — OFFICE VISIT (OUTPATIENT)
Dept: FAMILY MEDICINE | Facility: CLINIC | Age: 33
End: 2023-07-06
Payer: MEDICAID

## 2023-07-06 VITALS
WEIGHT: 167.13 LBS | BODY MASS INDEX: 24.75 KG/M2 | TEMPERATURE: 98 F | HEART RATE: 79 BPM | HEIGHT: 69 IN | SYSTOLIC BLOOD PRESSURE: 124 MMHG | DIASTOLIC BLOOD PRESSURE: 80 MMHG | OXYGEN SATURATION: 96 %

## 2023-07-06 DIAGNOSIS — J10.1 INFLUENZA B: Primary | ICD-10-CM

## 2023-07-06 DIAGNOSIS — J20.9 ACUTE BRONCHITIS, UNSPECIFIED ORGANISM: ICD-10-CM

## 2023-07-06 DIAGNOSIS — Z72.0 TOBACCO ABUSE: ICD-10-CM

## 2023-07-06 PROCEDURE — 3008F PR BODY MASS INDEX (BMI) DOCUMENTED: ICD-10-PCS | Mod: CPTII,,, | Performed by: NURSE PRACTITIONER

## 2023-07-06 PROCEDURE — 3079F DIAST BP 80-89 MM HG: CPT | Mod: CPTII,,, | Performed by: NURSE PRACTITIONER

## 2023-07-06 PROCEDURE — 3008F BODY MASS INDEX DOCD: CPT | Mod: CPTII,,, | Performed by: NURSE PRACTITIONER

## 2023-07-06 PROCEDURE — 3074F SYST BP LT 130 MM HG: CPT | Mod: CPTII,,, | Performed by: NURSE PRACTITIONER

## 2023-07-06 PROCEDURE — 1159F PR MEDICATION LIST DOCUMENTED IN MEDICAL RECORD: ICD-10-PCS | Mod: CPTII,,, | Performed by: NURSE PRACTITIONER

## 2023-07-06 PROCEDURE — 99999 PR PBB SHADOW E&M-EST. PATIENT-LVL IV: ICD-10-PCS | Mod: PBBFAC,,, | Performed by: NURSE PRACTITIONER

## 2023-07-06 PROCEDURE — 1159F MED LIST DOCD IN RCRD: CPT | Mod: CPTII,,, | Performed by: NURSE PRACTITIONER

## 2023-07-06 PROCEDURE — 1160F PR REVIEW ALL MEDS BY PRESCRIBER/CLIN PHARMACIST DOCUMENTED: ICD-10-PCS | Mod: CPTII,,, | Performed by: NURSE PRACTITIONER

## 2023-07-06 PROCEDURE — 99213 PR OFFICE/OUTPT VISIT, EST, LEVL III, 20-29 MIN: ICD-10-PCS | Mod: S$PBB,,, | Performed by: NURSE PRACTITIONER

## 2023-07-06 PROCEDURE — 3074F PR MOST RECENT SYSTOLIC BLOOD PRESSURE < 130 MM HG: ICD-10-PCS | Mod: CPTII,,, | Performed by: NURSE PRACTITIONER

## 2023-07-06 PROCEDURE — 1160F RVW MEDS BY RX/DR IN RCRD: CPT | Mod: CPTII,,, | Performed by: NURSE PRACTITIONER

## 2023-07-06 PROCEDURE — 99999 PR PBB SHADOW E&M-EST. PATIENT-LVL IV: CPT | Mod: PBBFAC,,, | Performed by: NURSE PRACTITIONER

## 2023-07-06 PROCEDURE — 99214 OFFICE O/P EST MOD 30 MIN: CPT | Mod: PBBFAC,PO | Performed by: NURSE PRACTITIONER

## 2023-07-06 PROCEDURE — 99213 OFFICE O/P EST LOW 20 MIN: CPT | Mod: S$PBB,,, | Performed by: NURSE PRACTITIONER

## 2023-07-06 PROCEDURE — 3079F PR MOST RECENT DIASTOLIC BLOOD PRESSURE 80-89 MM HG: ICD-10-PCS | Mod: CPTII,,, | Performed by: NURSE PRACTITIONER

## 2023-07-06 NOTE — PROGRESS NOTES
Subjective:       Patient ID: Shruthi Riley is a 33 y.o. female.    Chief Complaint: Cough  Pt reports to clinic for completion of FMLA paperwork.  Works @ [employer], tested positive for flu on last week.  Continues to cough and wheeze, but reports improvement.  Started nicotine patches with decrease in tobacco abuse.   Enrolled in smoker's cessation.     Past Medical History:   Diagnosis Date    Depression     Hepatitis C     s/p Mavyret    IV drug abuse     Quit 3 years ago       Current Outpatient Medications on File Prior to Visit   Medication Sig Dispense Refill    albuterol (PROVENTIL/VENTOLIN HFA) 90 mcg/actuation inhaler Inhale 2 puffs into the lungs every 6 (six) hours as needed for Wheezing. Dispense with spacer. 18 g 0    albuterol (PROVENTIL/VENTOLIN HFA) 90 mcg/actuation inhaler Inhale 2 puffs into the lungs every 6 (six) hours as needed for Wheezing. 18 g 0    amoxicillin-clavulanate 875-125mg (AUGMENTIN) 875-125 mg per tablet Take 1 tablet by mouth every 12 (twelve) hours. 20 tablet 0    dextroamphetamine-amphetamine (ADDERALL XR) 20 MG 24 hr capsule Take 1 capsule (20 mg total) by mouth every morning. 30 capsule 0    FLUoxetine 20 MG capsule Take 20 mg by mouth once daily.      FLUoxetine 40 MG capsule Take 40 mg by mouth once daily.      fluticasone propionate (FLONASE) 50 mcg/actuation nasal spray 2 sprays (100 mcg total) by Each Nostril route once daily. 16 g 0    nicotine (NICODERM CQ) 21 mg/24 hr Place 1 patch onto the skin once daily. [place] with Medicaid 28 patch 0    nicotine, polacrilex, (NICORETTE) 2 mg Gum Take 1 each (2 mg total) by mouth as needed (as needed). Fill with Medicaid; can change amount according to plan.  Dispense CINNAMON flavor please. Can take 1-2 per hour in place of a cigarette for breakthrough cravings. 100 each 0    OXcarbazepine (TRILEPTAL) 300 MG Tab Take 300 mg by mouth 2 (two) times daily.      QUEtiapine (SEROQUEL) 25 MG Tab Take 25 mg by mouth every evening.       traZODone (DESYREL) 150 MG tablet Take 1 tablet (150 mg total) by mouth every evening. 30 tablet 2    valACYclovir (VALTREX) 1000 MG tablet Take 1,000 mg by mouth 2 (two) times daily.      levocetirizine (XYZAL) 5 MG tablet Take 1 tablet (5 mg total) by mouth every evening. 90 tablet 3    [] oseltamivir (TAMIFLU) 75 MG capsule Take 1 capsule (75 mg total) by mouth 2 (two) times daily. for 5 days 10 capsule 0     No current facility-administered medications on file prior to visit.      Cough  This is a new problem. The current episode started in the past 7 days. The problem has been unchanged. The cough is Non-productive. Associated symptoms include wheezing. She has tried ipratropium inhaler for the symptoms. Her past medical history is significant for bronchitis.   Review of Systems   Constitutional: Negative.    HENT: Negative.     Respiratory:  Positive for cough and wheezing.    Cardiovascular: Negative.    Gastrointestinal: Negative.    Musculoskeletal: Negative.    Skin: Negative.    Neurological: Negative.      Objective:      Physical Exam  Vitals reviewed.   HENT:      Head: Normocephalic.      Right Ear: External ear normal.      Left Ear: External ear normal.      Nose: Rhinorrhea present.   Eyes:      Extraocular Movements: Extraocular movements intact.   Cardiovascular:      Rate and Rhythm: Normal rate.      Heart sounds: Normal heart sounds.   Pulmonary:      Effort: Pulmonary effort is normal.      Breath sounds: Wheezing present.   Musculoskeletal:      Cervical back: Normal range of motion.   Skin:     Coloration: Skin is not jaundiced.   Neurological:      Mental Status: She is alert and oriented to person, place, and time.   Psychiatric:         Behavior: Behavior normal.       Assessment:       1. Influenza B    2. Acute bronchitis, unspecified organism    3. Tobacco abuse        Plan:   Influenza B    Acute bronchitis, unspecified organism  [facility] paperwork completed  Tobacco  abuse  -cessation efforts praised.  Encouraged to continue    No follow-ups on file.

## 2023-07-10 ENCOUNTER — CLINICAL SUPPORT (OUTPATIENT)
Dept: SMOKING CESSATION | Facility: CLINIC | Age: 33
End: 2023-07-10

## 2023-07-10 ENCOUNTER — PATIENT MESSAGE (OUTPATIENT)
Dept: FAMILY MEDICINE | Facility: CLINIC | Age: 33
End: 2023-07-10
Payer: MEDICAID

## 2023-07-10 VITALS — OXYGEN SATURATION: 96 %

## 2023-07-10 DIAGNOSIS — F17.200 NICOTINE DEPENDENCE: Primary | ICD-10-CM

## 2023-07-10 PROCEDURE — 99403 PREV MED CNSL INDIV APPRX 45: CPT | Mod: S$GLB,,,

## 2023-07-10 PROCEDURE — 99403 PR PREVENT COUNSEL,INDIV,45 MIN: ICD-10-PCS | Mod: S$GLB,,,

## 2023-07-10 PROCEDURE — 99999 PR PBB SHADOW E&M-EST. PATIENT-LVL II: CPT | Mod: PBBFAC,,,

## 2023-07-10 PROCEDURE — 99999 PR PBB SHADOW E&M-EST. PATIENT-LVL II: ICD-10-PCS | Mod: PBBFAC,,,

## 2023-07-10 RX ORDER — DEXTROAMPHETAMINE SACCHARATE, AMPHETAMINE ASPARTATE MONOHYDRATE, DEXTROAMPHETAMINE SULFATE AND AMPHETAMINE SULFATE 5; 5; 5; 5 MG/1; MG/1; MG/1; MG/1
20 CAPSULE, EXTENDED RELEASE ORAL EVERY MORNING
Qty: 30 CAPSULE | Refills: 0 | Status: SHIPPED | OUTPATIENT
Start: 2023-07-10 | End: 2023-08-11 | Stop reason: SDUPTHER

## 2023-07-10 NOTE — PROGRESS NOTES
Individual Follow-Up Form    7/10/2023    Quit Date: tbd    Clinical Status of Patient: Outpatient    Length of Service: 45 minutes    Continuing Medication: yes  Patches or Nicotine gum    Other Medications:none     Target Symptoms: Withdrawal and medication side effects. The following were  rated moderate (3) to severe (4) on TCRS:  Moderate (3): none  Severe (4): none    Comments: The patient was seen in the clinic for smoking cessation follow up.  She states she smokes 10 cigarettes per day (down form 18) and there was a day she had 4 only. Per Smokerlyzer CO 18 ppm, last smoked 1 hour prior to meeting.  The patient remains on the prescribed tobacco cessation medication regimen of 21 mg patches and 2 mg gum without any negative side effects at this time. She states the gum just mushes in her mouth and is not really using, I advised to keep it around, when she has no cigarettes around the gum really helps. Session Focus:  orientation, client introductions, completion of TCRS (Tobacco Cessation Rating Scale) learned addiction model, cues/triggers, personal reasons for quitting, medications, goals, quit date could be today, she is out of cigarettes and plans to not get any more. GOALS: 1. Not buy more cigarettes. 2. If she smokes, note when and push for longer in between cigarettes.  The patient denies any abnormal behavioral or mental changes at this time. The patient will continue with  therapy sessions and medication monitoring by CTTS. Prescribed medication management will be by physician.    Diagnosis: F17.210    Next Visit: 2 weeks

## 2023-07-10 NOTE — Clinical Note
The patient was seen in the clinic for smoking cessation follow up.  She states she smokes 10 cigarettes per day (down form 18) and there was a day she had 4 only. Per Smokerlyzer CO 18 ppm, last smoked 1 hour prior to meeting.  The patient remains on the prescribed tobacco cessation medication regimen of 21 mg patches and 2 mg gum without any negative side effects at this time. She states the gum just mushes in her mouth and is not really using, I advised to keep it around, when she has no cigarettes around the gum really helps. Session Focus:  orientation, client introductions, completion of TCRS (Tobacco Cessation Rating Scale) learned addiction model, cues/triggers, personal reasons for quitting, medications, goals, quit date could be today, she is out of cigarettes and plans to not get any more. GOALS: 1. Not buy more cigarettes. 2. If she smokes, note when and push for longer in between cigarettes.  The patient denies any abnormal behavioral or mental changes at this time.

## 2023-07-18 ENCOUNTER — TELEPHONE (OUTPATIENT)
Dept: FAMILY MEDICINE | Facility: CLINIC | Age: 33
End: 2023-07-18
Payer: MEDICAID

## 2023-07-18 NOTE — TELEPHONE ENCOUNTER
----- Message from Gustavo Grimaldo sent at 7/18/2023  2:25 PM CDT -----  Contact: SANJAY  Patient is calling to follow up with nurse elijah regarding paperwork she needs, reports only have 5 days left to get paperwork.. Please call patient back at [phone] to further discuss         Thanks

## 2023-07-18 NOTE — TELEPHONE ENCOUNTER
Spoke to pt and told her that I would resend the copy to jayson and have a copy at the desk for her.

## 2023-07-20 ENCOUNTER — TELEPHONE (OUTPATIENT)
Dept: SMOKING CESSATION | Facility: CLINIC | Age: 33
End: 2023-07-20
Payer: MEDICAID

## 2023-07-20 NOTE — TELEPHONE ENCOUNTER
Attempted to call patient to reschedule their smoking cessation appointment. Left a message with my contact information to reschedule the appointment. Elena Ybarra [phone].

## 2023-08-03 ENCOUNTER — TELEPHONE (OUTPATIENT)
Dept: SMOKING CESSATION | Facility: CLINIC | Age: 33
End: 2023-08-03
Payer: MEDICAID

## 2023-08-03 NOTE — TELEPHONE ENCOUNTER
Attempted to call patient to reschedule their smoking cessation appointment. Left a message with my contact information to reschedule the appointment. Elena Ybarra [phone].

## 2023-08-14 RX ORDER — DEXTROAMPHETAMINE SACCHARATE, AMPHETAMINE ASPARTATE MONOHYDRATE, DEXTROAMPHETAMINE SULFATE AND AMPHETAMINE SULFATE 5; 5; 5; 5 MG/1; MG/1; MG/1; MG/1
20 CAPSULE, EXTENDED RELEASE ORAL EVERY MORNING
Qty: 30 CAPSULE | Refills: 0 | Status: SHIPPED | OUTPATIENT
Start: 2023-08-14 | End: 2023-09-13 | Stop reason: SDUPTHER

## 2023-08-17 ENCOUNTER — TELEPHONE (OUTPATIENT)
Dept: SMOKING CESSATION | Facility: CLINIC | Age: 33
End: 2023-08-17
Payer: MEDICAID

## 2023-08-17 NOTE — TELEPHONE ENCOUNTER
Attempted to call patient to reschedule their smoking cessation appointment. Left a message with my contact information to reschedule the appointment. Elena Ybarra [phone].

## 2023-09-13 RX ORDER — HYDROXYZINE HYDROCHLORIDE 10 MG/1
10-20 TABLET, FILM COATED ORAL 3 TIMES DAILY PRN
COMMUNITY
Start: 2023-09-08

## 2023-09-14 RX ORDER — DEXTROAMPHETAMINE SACCHARATE, AMPHETAMINE ASPARTATE MONOHYDRATE, DEXTROAMPHETAMINE SULFATE AND AMPHETAMINE SULFATE 5; 5; 5; 5 MG/1; MG/1; MG/1; MG/1
20 CAPSULE, EXTENDED RELEASE ORAL EVERY MORNING
Qty: 30 CAPSULE | Refills: 0 | Status: SHIPPED | OUTPATIENT
Start: 2023-09-14 | End: 2023-10-26 | Stop reason: SDUPTHER

## 2023-09-20 ENCOUNTER — TELEPHONE (OUTPATIENT)
Dept: SMOKING CESSATION | Facility: CLINIC | Age: 33
End: 2023-09-20
Payer: MEDICAID

## 2023-09-20 NOTE — TELEPHONE ENCOUNTER
First attempt regarding smoking cessation quit 1 episode. When I informed pt where I was calling from she hung up. Called back and was sent to voicemail, unable to leave message due to no voicemail set up. -Veronika Benitez, CTTS [phone].

## 2023-10-12 RX ORDER — DEXTROAMPHETAMINE SACCHARATE, AMPHETAMINE ASPARTATE MONOHYDRATE, DEXTROAMPHETAMINE SULFATE AND AMPHETAMINE SULFATE 5; 5; 5; 5 MG/1; MG/1; MG/1; MG/1
20 CAPSULE, EXTENDED RELEASE ORAL EVERY MORNING
Qty: 30 CAPSULE | Refills: 0 | OUTPATIENT
Start: 2023-10-12

## 2023-10-24 ENCOUNTER — TELEPHONE (OUTPATIENT)
Dept: FAMILY MEDICINE | Facility: CLINIC | Age: 33
End: 2023-10-24
Payer: MEDICAID

## 2023-10-24 NOTE — TELEPHONE ENCOUNTER
----- Message from Mary Lacey sent at 10/24/2023 10:21 AM CDT -----  Regarding: pt request  Name of Who is Calling:CHRISTEN PARR [[id number]]          What is the request in detail: pt called to reschedule missed appt           Can the clinic reply by [facility]: no           What Number to Call Back if not in [facility]: [phone] (home)

## 2023-10-24 NOTE — TELEPHONE ENCOUNTER
Spoke to pt. Scheduled appt with Freya Newell tomorrow at  10:40 am. Pt verbalized understanding.

## 2023-10-25 ENCOUNTER — OFFICE VISIT (OUTPATIENT)
Dept: FAMILY MEDICINE | Facility: CLINIC | Age: 33
End: 2023-10-25
Payer: MEDICAID

## 2023-10-25 ENCOUNTER — LAB VISIT (OUTPATIENT)
Dept: LAB | Facility: HOSPITAL | Age: 33
End: 2023-10-25
Attending: NURSE PRACTITIONER
Payer: MEDICAID

## 2023-10-25 VITALS
DIASTOLIC BLOOD PRESSURE: 82 MMHG | WEIGHT: 168.63 LBS | HEART RATE: 109 BPM | BODY MASS INDEX: 24.98 KG/M2 | TEMPERATURE: 99 F | HEIGHT: 69 IN | OXYGEN SATURATION: 97 % | SYSTOLIC BLOOD PRESSURE: 120 MMHG

## 2023-10-25 DIAGNOSIS — F31.9 BIPOLAR 1 DISORDER: Primary | ICD-10-CM

## 2023-10-25 DIAGNOSIS — F31.9 BIPOLAR 1 DISORDER: ICD-10-CM

## 2023-10-25 LAB
ALBUMIN SERPL BCP-MCNC: 4 G/DL (ref 3.5–5.2)
ALP SERPL-CCNC: 73 U/L (ref 55–135)
ALT SERPL W/O P-5'-P-CCNC: 22 U/L (ref 10–44)
ANION GAP SERPL CALC-SCNC: 11 MMOL/L (ref 8–16)
AST SERPL-CCNC: 39 U/L (ref 10–40)
BASOPHILS # BLD AUTO: 0.06 K/UL (ref 0–0.2)
BASOPHILS NFR BLD: 0.6 % (ref 0–1.9)
BILIRUB SERPL-MCNC: 0.3 MG/DL (ref 0.1–1)
BUN SERPL-MCNC: 12 MG/DL (ref 6–20)
CALCIUM SERPL-MCNC: 9.5 MG/DL (ref 8.7–10.5)
CHLORIDE SERPL-SCNC: 106 MMOL/L (ref 95–110)
CO2 SERPL-SCNC: 27 MMOL/L (ref 23–29)
CREAT SERPL-MCNC: 0.8 MG/DL (ref 0.5–1.4)
DIFFERENTIAL METHOD: NORMAL
EOSINOPHIL # BLD AUTO: 0.2 K/UL (ref 0–0.5)
EOSINOPHIL NFR BLD: 1.5 % (ref 0–8)
ERYTHROCYTE [DISTWIDTH] IN BLOOD BY AUTOMATED COUNT: 13 % (ref 11.5–14.5)
EST. GFR  (NO RACE VARIABLE): >60 ML/MIN/1.73 M^2
GLUCOSE SERPL-MCNC: 75 MG/DL (ref 70–110)
HCT VFR BLD AUTO: 40 % (ref 37–48.5)
HGB BLD-MCNC: 13 G/DL (ref 12–16)
IMM GRANULOCYTES # BLD AUTO: 0.01 K/UL (ref 0–0.04)
IMM GRANULOCYTES NFR BLD AUTO: 0.1 % (ref 0–0.5)
LYMPHOCYTES # BLD AUTO: 3 K/UL (ref 1–4.8)
LYMPHOCYTES NFR BLD: 30.7 % (ref 18–48)
MCH RBC QN AUTO: 29 PG (ref 27–31)
MCHC RBC AUTO-ENTMCNC: 32.5 G/DL (ref 32–36)
MCV RBC AUTO: 89 FL (ref 82–98)
MONOCYTES # BLD AUTO: 0.9 K/UL (ref 0.3–1)
MONOCYTES NFR BLD: 8.9 % (ref 4–15)
NEUTROPHILS # BLD AUTO: 5.7 K/UL (ref 1.8–7.7)
NEUTROPHILS NFR BLD: 58.2 % (ref 38–73)
NRBC BLD-RTO: 0 /100 WBC
PLATELET # BLD AUTO: 357 K/UL (ref 150–450)
PMV BLD AUTO: 10.1 FL (ref 9.2–12.9)
POTASSIUM SERPL-SCNC: 4.1 MMOL/L (ref 3.5–5.1)
PROT SERPL-MCNC: 6.6 G/DL (ref 6–8.4)
RBC # BLD AUTO: 4.48 M/UL (ref 4–5.4)
SODIUM SERPL-SCNC: 144 MMOL/L (ref 136–145)
WBC # BLD AUTO: 9.76 K/UL (ref 3.9–12.7)

## 2023-10-25 PROCEDURE — 99999 PR PBB SHADOW E&M-EST. PATIENT-LVL IV: CPT | Mod: PBBFAC,,, | Performed by: NURSE PRACTITIONER

## 2023-10-25 PROCEDURE — 36415 COLL VENOUS BLD VENIPUNCTURE: CPT | Mod: PO | Performed by: NURSE PRACTITIONER

## 2023-10-25 PROCEDURE — 3079F PR MOST RECENT DIASTOLIC BLOOD PRESSURE 80-89 MM HG: ICD-10-PCS | Mod: CPTII,,, | Performed by: NURSE PRACTITIONER

## 2023-10-25 PROCEDURE — 99213 PR OFFICE/OUTPT VISIT, EST, LEVL III, 20-29 MIN: ICD-10-PCS | Mod: S$PBB,,, | Performed by: NURSE PRACTITIONER

## 2023-10-25 PROCEDURE — 3079F DIAST BP 80-89 MM HG: CPT | Mod: CPTII,,, | Performed by: NURSE PRACTITIONER

## 2023-10-25 PROCEDURE — 80053 COMPREHEN METABOLIC PANEL: CPT | Performed by: NURSE PRACTITIONER

## 2023-10-25 PROCEDURE — 99213 OFFICE O/P EST LOW 20 MIN: CPT | Mod: S$PBB,,, | Performed by: NURSE PRACTITIONER

## 2023-10-25 PROCEDURE — 99214 OFFICE O/P EST MOD 30 MIN: CPT | Mod: PBBFAC,PO | Performed by: NURSE PRACTITIONER

## 2023-10-25 PROCEDURE — 3074F SYST BP LT 130 MM HG: CPT | Mod: CPTII,,, | Performed by: NURSE PRACTITIONER

## 2023-10-25 PROCEDURE — 99999 PR PBB SHADOW E&M-EST. PATIENT-LVL IV: ICD-10-PCS | Mod: PBBFAC,,, | Performed by: NURSE PRACTITIONER

## 2023-10-25 PROCEDURE — 3008F PR BODY MASS INDEX (BMI) DOCUMENTED: ICD-10-PCS | Mod: CPTII,,, | Performed by: NURSE PRACTITIONER

## 2023-10-25 PROCEDURE — 1159F MED LIST DOCD IN RCRD: CPT | Mod: CPTII,,, | Performed by: NURSE PRACTITIONER

## 2023-10-25 PROCEDURE — 1159F PR MEDICATION LIST DOCUMENTED IN MEDICAL RECORD: ICD-10-PCS | Mod: CPTII,,, | Performed by: NURSE PRACTITIONER

## 2023-10-25 PROCEDURE — 3008F BODY MASS INDEX DOCD: CPT | Mod: CPTII,,, | Performed by: NURSE PRACTITIONER

## 2023-10-25 PROCEDURE — 85025 COMPLETE CBC W/AUTO DIFF WBC: CPT | Performed by: NURSE PRACTITIONER

## 2023-10-25 PROCEDURE — 3074F PR MOST RECENT SYSTOLIC BLOOD PRESSURE < 130 MM HG: ICD-10-PCS | Mod: CPTII,,, | Performed by: NURSE PRACTITIONER

## 2023-10-26 ENCOUNTER — PATIENT MESSAGE (OUTPATIENT)
Dept: FAMILY MEDICINE | Facility: CLINIC | Age: 33
End: 2023-10-26
Payer: MEDICAID

## 2023-10-27 RX ORDER — DEXTROAMPHETAMINE SACCHARATE, AMPHETAMINE ASPARTATE MONOHYDRATE, DEXTROAMPHETAMINE SULFATE AND AMPHETAMINE SULFATE 5; 5; 5; 5 MG/1; MG/1; MG/1; MG/1
20 CAPSULE, EXTENDED RELEASE ORAL EVERY MORNING
Qty: 30 CAPSULE | Refills: 0 | Status: SHIPPED | OUTPATIENT
Start: 2023-10-27 | End: 2023-11-27 | Stop reason: SDUPTHER

## 2023-10-29 NOTE — PROGRESS NOTES
Subjective:       Patient ID: Shruthi Riley is a 33 y.o. female.    Chief Complaint: Follow-up  Pt reports to clinic to discuss recent admission to [facility].  Reports recent manic episode.   called .  Living with friend.  Sleeping.  Feels as though mood is better. Has upcoming appt with psychiatrist.  No SI Or HI    Past Medical History:   Diagnosis Date    Depression     Hepatitis C     s/p Mavyret    IV drug abuse     Quit 3 years ago       Current Outpatient Medications on File Prior to Visit   Medication Sig Dispense Refill    albuterol (PROVENTIL/VENTOLIN HFA) 90 mcg/actuation inhaler Inhale 2 puffs into the lungs every 6 (six) hours as needed for Wheezing. Dispense with spacer. 18 g 0    albuterol (PROVENTIL/VENTOLIN HFA) 90 mcg/actuation inhaler Inhale 2 puffs into the lungs every 6 (six) hours as needed for Wheezing. 18 g 0    FLUoxetine 20 MG capsule Take 20 mg by mouth once daily.      FLUoxetine 40 MG capsule Take 40 mg by mouth once daily.      fluticasone propionate (FLONASE) 50 mcg/actuation nasal spray 2 sprays (100 mcg total) by Each Nostril route once daily. 16 g 0    hydrOXYzine HCL (ATARAX) 10 MG Tab Take 10-20 mg by mouth 3 (three) times daily as needed.      nicotine (NICODERM CQ) 21 mg/24 hr Place 1 patch onto the skin once daily. [place] with Medicaid 28 patch 0    nicotine, polacrilex, (NICORETTE) 2 mg Gum Take 1 each (2 mg total) by mouth as needed (as needed). Fill with Medicaid; can change amount according to plan.  Dispense CINNAMON flavor please. Can take 1-2 per hour in place of a cigarette for breakthrough cravings. 100 each 0    QUEtiapine (SEROQUEL) 25 MG Tab Take 25 mg by mouth every evening.      traZODone (DESYREL) 150 MG tablet Take 1 tablet (150 mg total) by mouth every evening. 30 tablet 2    valACYclovir (VALTREX) 1000 MG tablet Take 1,000 mg by mouth 2 (two) times daily.      levocetirizine (XYZAL) 5 MG tablet Take 1 tablet (5 mg total) by mouth every  evening. 90 tablet 3     No current facility-administered medications on file prior to visit.      Depression  Visit Type: follow-up  Patient presents with the following symptoms: depressed mood, irritability, nervousness/anxiety, panic and restlessness.  Patient is not experiencing: confusion, palpitations, shortness of breath and suicidal ideas.   Severity: severe       Review of Systems   Constitutional:  Positive for irritability. Negative for appetite change, chills, fatigue, fever and unexpected weight change.   HENT:  Negative for congestion, ear pain, sinus pressure, sneezing and sore throat.    Eyes: Negative.    Respiratory:  Negative for cough, chest tightness, shortness of breath and wheezing.    Cardiovascular:  Negative for chest pain and palpitations.   Gastrointestinal:  Negative for abdominal pain, constipation, diarrhea, nausea and vomiting.   Endocrine: Negative.    Genitourinary:  Negative for difficulty urinating, dysuria, flank pain, frequency, hematuria, pelvic pain, urgency and vaginal discharge.   Musculoskeletal: Negative.    Skin: Negative.    Allergic/Immunologic: Negative.    Neurological:  Negative for dizziness and headaches.   Hematological: Negative.    Psychiatric/Behavioral:  Positive for depression. Negative for behavioral problems, confusion, hallucinations and suicidal ideas. The patient is nervous/anxious.        Objective:      Physical Exam  Vitals reviewed.   Constitutional:       Appearance: She is well-developed.   HENT:      Head: Normocephalic.      Mouth/Throat:      Pharynx: No oropharyngeal exudate.   Eyes:      Pupils: Pupils are equal, round, and reactive to light.   Neck:      Vascular: No JVD.      Trachea: No tracheal deviation.   Cardiovascular:      Rate and Rhythm: Normal rate and regular rhythm.      Heart sounds: Normal heart sounds. No murmur heard.  Pulmonary:      Effort: Pulmonary effort is normal. No respiratory distress.      Breath sounds: Normal  breath sounds.   Abdominal:      General: Bowel sounds are normal. There is no distension.      Palpations: Abdomen is soft.      Tenderness: There is no abdominal tenderness.   Musculoskeletal:         General: Normal range of motion.      Cervical back: Normal range of motion.   Skin:     General: Skin is warm and dry.   Neurological:      Mental Status: She is alert and oriented to person, place, and time.      Deep Tendon Reflexes: Reflexes are normal and symmetric.   Psychiatric:         Speech: Speech normal.         Behavior: Behavior normal.         Assessment:       1. Bipolar 1 disorder        Plan:   Bipolar 1 disorder  -     CBC Auto Differential; Future; Expected date: 10/25/2023  -     Comprehensive Metabolic Panel; Future; Expected date: 10/25/2023  Follow up with psych    No follow-ups on file.

## 2023-11-28 RX ORDER — DEXTROAMPHETAMINE SACCHARATE, AMPHETAMINE ASPARTATE MONOHYDRATE, DEXTROAMPHETAMINE SULFATE AND AMPHETAMINE SULFATE 5; 5; 5; 5 MG/1; MG/1; MG/1; MG/1
20 CAPSULE, EXTENDED RELEASE ORAL EVERY MORNING
Qty: 30 CAPSULE | Refills: 0 | Status: SHIPPED | OUTPATIENT
Start: 2023-11-28 | End: 2024-01-03 | Stop reason: SDUPTHER

## 2023-11-30 ENCOUNTER — TELEPHONE (OUTPATIENT)
Dept: SMOKING CESSATION | Facility: CLINIC | Age: 33
End: 2023-11-30
Payer: MEDICAID

## 2023-11-30 NOTE — TELEPHONE ENCOUNTER
"1st attempt call regarding smoking cessation 6 month telephone follow up for quit 1 episode. PT did not answer phone and pt's voicemail box "is full and cannot accept any new messages".   " Received signed and completed form from Physician's Office.    Completed form faxed to Justin

## 2023-12-26 RX ORDER — DEXTROAMPHETAMINE SACCHARATE, AMPHETAMINE ASPARTATE MONOHYDRATE, DEXTROAMPHETAMINE SULFATE AND AMPHETAMINE SULFATE 5; 5; 5; 5 MG/1; MG/1; MG/1; MG/1
20 CAPSULE, EXTENDED RELEASE ORAL EVERY MORNING
Qty: 30 CAPSULE | Refills: 0 | OUTPATIENT
Start: 2023-12-26

## 2023-12-26 RX ORDER — DEXTROAMPHETAMINE SACCHARATE, AMPHETAMINE ASPARTATE MONOHYDRATE, DEXTROAMPHETAMINE SULFATE AND AMPHETAMINE SULFATE 5; 5; 5; 5 MG/1; MG/1; MG/1; MG/1
20 CAPSULE, EXTENDED RELEASE ORAL EVERY MORNING
Qty: 30 CAPSULE | Refills: 0 | Status: CANCELLED | OUTPATIENT
Start: 2023-12-26

## 2023-12-26 NOTE — TELEPHONE ENCOUNTER
Care Due:                  Date            Visit Type   Department     Provider  --------------------------------------------------------------------------------    Last Visit: None Found      None         None Found  Next Visit: None Scheduled  None         None Found                                                            Last  Test          Frequency    Reason                     Performed    Due Date  --------------------------------------------------------------------------------    Office Visit  12 months..  nicotine, nicotine,......  Not Found    Overdue    [employer] Embedded Care Due Messages. Reference number: [id number].   12/26/2023 9:17:51 AM CST

## 2023-12-27 ENCOUNTER — PATIENT MESSAGE (OUTPATIENT)
Dept: FAMILY MEDICINE | Facility: CLINIC | Age: 33
End: 2023-12-27
Payer: MEDICAID

## 2024-01-03 ENCOUNTER — PATIENT MESSAGE (OUTPATIENT)
Dept: FAMILY MEDICINE | Facility: CLINIC | Age: 34
End: 2024-01-03

## 2024-01-03 RX ORDER — DEXTROAMPHETAMINE SACCHARATE, AMPHETAMINE ASPARTATE MONOHYDRATE, DEXTROAMPHETAMINE SULFATE AND AMPHETAMINE SULFATE 5; 5; 5; 5 MG/1; MG/1; MG/1; MG/1
20 CAPSULE, EXTENDED RELEASE ORAL EVERY MORNING
Qty: 30 CAPSULE | Refills: 0 | Status: SHIPPED | OUTPATIENT
Start: 2024-01-03 | End: 2024-02-08 | Stop reason: SDUPTHER

## 2024-02-08 ENCOUNTER — OFFICE VISIT (OUTPATIENT)
Dept: FAMILY MEDICINE | Facility: CLINIC | Age: 34
End: 2024-02-08
Payer: MEDICAID

## 2024-02-08 DIAGNOSIS — F90.9 ATTENTION DEFICIT HYPERACTIVITY DISORDER (ADHD), UNSPECIFIED ADHD TYPE: Primary | ICD-10-CM

## 2024-02-08 PROCEDURE — 99213 OFFICE O/P EST LOW 20 MIN: CPT | Mod: 95,,, | Performed by: NURSE PRACTITIONER

## 2024-02-08 PROCEDURE — 1160F RVW MEDS BY RX/DR IN RCRD: CPT | Mod: CPTII,95,, | Performed by: NURSE PRACTITIONER

## 2024-02-08 PROCEDURE — 1159F MED LIST DOCD IN RCRD: CPT | Mod: CPTII,95,, | Performed by: NURSE PRACTITIONER

## 2024-02-08 RX ORDER — LURASIDONE HYDROCHLORIDE 20 MG/1
80 TABLET, FILM COATED ORAL DAILY
COMMUNITY

## 2024-02-08 RX ORDER — DEXTROAMPHETAMINE SACCHARATE, AMPHETAMINE ASPARTATE MONOHYDRATE, DEXTROAMPHETAMINE SULFATE AND AMPHETAMINE SULFATE 5; 5; 5; 5 MG/1; MG/1; MG/1; MG/1
20 CAPSULE, EXTENDED RELEASE ORAL EVERY MORNING
Qty: 30 CAPSULE | Refills: 0 | Status: SHIPPED | OUTPATIENT
Start: 2024-04-07 | End: 2024-05-06 | Stop reason: SDUPTHER

## 2024-02-08 RX ORDER — DEXTROAMPHETAMINE SACCHARATE, AMPHETAMINE ASPARTATE MONOHYDRATE, DEXTROAMPHETAMINE SULFATE AND AMPHETAMINE SULFATE 5; 5; 5; 5 MG/1; MG/1; MG/1; MG/1
20 CAPSULE, EXTENDED RELEASE ORAL EVERY MORNING
Qty: 30 CAPSULE | Refills: 0 | Status: SHIPPED | OUTPATIENT
Start: 2024-03-08

## 2024-02-08 RX ORDER — DEXTROAMPHETAMINE SACCHARATE, AMPHETAMINE ASPARTATE MONOHYDRATE, DEXTROAMPHETAMINE SULFATE AND AMPHETAMINE SULFATE 5; 5; 5; 5 MG/1; MG/1; MG/1; MG/1
20 CAPSULE, EXTENDED RELEASE ORAL EVERY MORNING
Qty: 30 CAPSULE | Refills: 0 | Status: SHIPPED | OUTPATIENT
Start: 2024-02-08 | End: 2024-03-08 | Stop reason: SDUPTHER

## 2024-02-08 NOTE — PROGRESS NOTES
Subjective:       Patient ID: Shruthi Hobson is a 33 y.o. female.    Chief Complaint: No chief complaint on file.  The patient location is: [place]   The chief complaint leading to consultation is: medication refill    Visit type: audiovisual    Face to Face time with patient: 15  15 minutes of total time spent on the encounter, which includes face to face time and non-face to face time preparing to see the patient (eg, review of tests), Obtaining and/or reviewing separately obtained history, Documenting clinical information in the electronic or other health record, Independently interpreting results (not separately reported) and communicating results to the patient/family/caregiver, or Care coordination (not separately reported).         Each patient to whom he or she provides medical services by telemedicine is:  (1) informed of the relationship between the physician and patient and the respective role of any other health care provider with respect to management of the patient; and (2) notified that he or she may decline to receive medical services by telemedicine and may withdraw from such care at any time.    Notes:  pt reports via tele medicine for medication refill on ADHD meds.  Focus is good.  New job with [employer]; has paused divorce at this time, moved back into family home.  Reports mood is good since beginning latuda.  No other complaints at this time    Past Medical History:   Diagnosis Date    Depression     Hepatitis C     s/p Mavyret    IV drug abuse     Quit 3 years ago       Current Outpatient Medications on File Prior to Visit   Medication Sig Dispense Refill    albuterol (PROVENTIL/VENTOLIN HFA) 90 mcg/actuation inhaler Inhale 2 puffs into the lungs every 6 (six) hours as needed for Wheezing. 18 g 0    FLUoxetine 20 MG capsule Take 20 mg by mouth once daily.      fluticasone propionate (FLONASE) 50 mcg/actuation nasal spray 2 sprays (100 mcg total) by Each Nostril route once daily. 16 g 0     hydrOXYzine HCL (ATARAX) 10 MG Tab Take 10-20 mg by mouth 3 (three) times daily as needed.      levocetirizine (XYZAL) 5 MG tablet Take 1 tablet (5 mg total) by mouth every evening. 90 tablet 3    lurasidone (LATUDA) 20 mg Tab tablet Take 80 mg by mouth once daily.      nicotine (NICODERM CQ) 21 mg/24 hr Place 1 patch onto the skin once daily. [place] with Medicaid 28 patch 0    nicotine, polacrilex, (NICORETTE) 2 mg Gum Take 1 each (2 mg total) by mouth as needed (as needed). Fill with Medicaid; can change amount according to plan.  Dispense CINNAMON flavor please. Can take 1-2 per hour in place of a cigarette for breakthrough cravings. 100 each 0    traZODone (DESYREL) 150 MG tablet Take 1 tablet (150 mg total) by mouth every evening. 30 tablet 2    valACYclovir (VALTREX) 1000 MG tablet Take 1,000 mg by mouth 2 (two) times daily.      [DISCONTINUED] dextroamphetamine-amphetamine (ADDERALL XR) 20 MG 24 hr capsule Take 1 capsule (20 mg total) by mouth every morning. 30 capsule 0    [DISCONTINUED] FLUoxetine 40 MG capsule Take 40 mg by mouth once daily.      [DISCONTINUED] QUEtiapine (SEROQUEL) 25 MG Tab Take 25 mg by mouth every evening.       No current facility-administered medications on file prior to visit.      HPI  Review of Systems   Constitutional:  Negative for activity change and unexpected weight change.   HENT:  Negative for hearing loss, rhinorrhea and trouble swallowing.    Eyes:  Negative for discharge and visual disturbance.   Respiratory:  Negative for chest tightness and wheezing.    Cardiovascular:  Negative for chest pain and palpitations.   Gastrointestinal:  Negative for blood in stool, constipation, diarrhea and vomiting.   Endocrine: Negative for polydipsia and polyuria.   Genitourinary:  Negative for difficulty urinating, dysuria, hematuria and menstrual problem.   Musculoskeletal:  Negative for arthralgias, joint swelling and neck pain.   Neurological:  Negative for weakness and headaches.    Psychiatric/Behavioral:  Negative for confusion and dysphoric mood.        Objective:      Physical Exam  HENT:      Head: Normocephalic.      Right Ear: External ear normal.      Left Ear: External ear normal.   Eyes:      Extraocular Movements: Extraocular movements intact.   Pulmonary:      Effort: Pulmonary effort is normal. No respiratory distress.   Musculoskeletal:      Cervical back: Normal range of motion.   Skin:     Coloration: Skin is not jaundiced.   Neurological:      Mental Status: She is alert and oriented to person, place, and time.   Psychiatric:         Behavior: Behavior normal.         Assessment:       1. Attention deficit hyperactivity disorder (ADHD), unspecified ADHD type        Plan:   Attention deficit hyperactivity disorder (ADHD), unspecified ADHD type    Other orders  -     dextroamphetamine-amphetamine (ADDERALL XR) 20 MG 24 hr capsule; Take 1 capsule (20 mg total) by mouth every morning.  Dispense: 30 capsule; Refill: 0  -     dextroamphetamine-amphetamine (ADDERALL XR) 20 MG 24 hr capsule; Take 1 capsule (20 mg total) by mouth every morning.  Dispense: 30 capsule; Refill: 0  -     dextroamphetamine-amphetamine (ADDERALL XR) 20 MG 24 hr capsule; Take 1 capsule (20 mg total) by mouth every morning.  Dispense: 30 capsule; Refill: 0    Stable continue treatment plan     No follow-ups on file.

## 2024-03-10 RX ORDER — DEXTROAMPHETAMINE SACCHARATE, AMPHETAMINE ASPARTATE MONOHYDRATE, DEXTROAMPHETAMINE SULFATE AND AMPHETAMINE SULFATE 5; 5; 5; 5 MG/1; MG/1; MG/1; MG/1
20 CAPSULE, EXTENDED RELEASE ORAL EVERY MORNING
Qty: 30 CAPSULE | Refills: 0 | Status: SHIPPED | OUTPATIENT
Start: 2024-03-10 | End: 2024-04-07 | Stop reason: SDUPTHER

## 2024-04-08 RX ORDER — DEXTROAMPHETAMINE SACCHARATE, AMPHETAMINE ASPARTATE MONOHYDRATE, DEXTROAMPHETAMINE SULFATE AND AMPHETAMINE SULFATE 5; 5; 5; 5 MG/1; MG/1; MG/1; MG/1
20 CAPSULE, EXTENDED RELEASE ORAL EVERY MORNING
Qty: 30 CAPSULE | Refills: 0 | Status: SHIPPED | OUTPATIENT
Start: 2024-04-08

## 2024-05-07 RX ORDER — DEXTROAMPHETAMINE SACCHARATE, AMPHETAMINE ASPARTATE MONOHYDRATE, DEXTROAMPHETAMINE SULFATE AND AMPHETAMINE SULFATE 5; 5; 5; 5 MG/1; MG/1; MG/1; MG/1
20 CAPSULE, EXTENDED RELEASE ORAL EVERY MORNING
Qty: 30 CAPSULE | Refills: 0 | Status: SHIPPED | OUTPATIENT
Start: 2024-05-07 | End: 2024-06-05 | Stop reason: SDUPTHER

## 2024-06-05 RX ORDER — DEXTROAMPHETAMINE SACCHARATE, AMPHETAMINE ASPARTATE MONOHYDRATE, DEXTROAMPHETAMINE SULFATE AND AMPHETAMINE SULFATE 5; 5; 5; 5 MG/1; MG/1; MG/1; MG/1
20 CAPSULE, EXTENDED RELEASE ORAL EVERY MORNING
Qty: 30 CAPSULE | Refills: 0 | Status: SHIPPED | OUTPATIENT
Start: 2024-06-05

## 2024-06-12 ENCOUNTER — TELEPHONE (OUTPATIENT)
Dept: SMOKING CESSATION | Facility: CLINIC | Age: 34
End: 2024-06-12
Payer: MEDICAID

## 2024-07-01 RX ORDER — DEXTROAMPHETAMINE SACCHARATE, AMPHETAMINE ASPARTATE MONOHYDRATE, DEXTROAMPHETAMINE SULFATE AND AMPHETAMINE SULFATE 5; 5; 5; 5 MG/1; MG/1; MG/1; MG/1
20 CAPSULE, EXTENDED RELEASE ORAL EVERY MORNING
Qty: 30 CAPSULE | Refills: 0 | Status: SHIPPED | OUTPATIENT
Start: 2024-07-01

## 2024-07-24 ENCOUNTER — PATIENT MESSAGE (OUTPATIENT)
Dept: HEPATOLOGY | Facility: CLINIC | Age: 34
End: 2024-07-24
Payer: MEDICAID

## 2024-07-30 RX ORDER — DEXTROAMPHETAMINE SACCHARATE, AMPHETAMINE ASPARTATE MONOHYDRATE, DEXTROAMPHETAMINE SULFATE AND AMPHETAMINE SULFATE 5; 5; 5; 5 MG/1; MG/1; MG/1; MG/1
20 CAPSULE, EXTENDED RELEASE ORAL EVERY MORNING
Qty: 30 CAPSULE | Refills: 0 | Status: SHIPPED | OUTPATIENT
Start: 2024-07-30

## (undated) DEVICE — SEE MEDLINE ITEM 157027

## (undated) DEVICE — DRAPE STERI LONG

## (undated) DEVICE — SUT MONOCRYL 4-0 PS-1 UND

## (undated) DEVICE — SEE MEDLINE ITEM 157117

## (undated) DEVICE — SOL NS 1000CC

## (undated) DEVICE — COVER LIGHT HANDLE 80/CA

## (undated) DEVICE — APPLICATOR CHLORAPREP ORN 26ML

## (undated) DEVICE — GLOVE SURGICAL LATEX SZ 6

## (undated) DEVICE — DRAPE LAVH LAPAROSCOPY W/FLUID

## (undated) DEVICE — KIT ANTIFOG

## (undated) DEVICE — CATH 16FR URETHRL RED RUB

## (undated) DEVICE — SYR 50CC LL

## (undated) DEVICE — SEE MEDLINE ITEM 157181

## (undated) DEVICE — SUT VICRYL 2-0 CT-2 VCP269H

## (undated) DEVICE — TROCAR ENDOPATH XCEL 5X100MM

## (undated) DEVICE — CLOSURE SKIN STERI STRIP 1/2X4

## (undated) DEVICE — ADHESIVE MASTISOL VIAL 48/BX

## (undated) DEVICE — SOL WATER STRL IRR 1000ML

## (undated) DEVICE — CONTAINER SPECIMEN STRL 4OZ

## (undated) DEVICE — SYR 3CC LUER LOC

## (undated) DEVICE — TRAY CATH URETHRAL FOLEY 16FR

## (undated) DEVICE — SHEARS HARMONIC 5CM 36CM

## (undated) DEVICE — CORD LAP 10 DISP

## (undated) DEVICE — SYR 10CC LUER LOCK

## (undated) DEVICE — SET PNEUMOCLEAR HEAT HUM SE HF

## (undated) DEVICE — MANIFOLD 4 PORT

## (undated) DEVICE — ELECTRODE REM PLYHSV RETURN 9

## (undated) DEVICE — ADHESIVE DERMABOND ADVANCED

## (undated) DEVICE — MANIPULATOR UTERINE

## (undated) DEVICE — SEE MEDLINE ITEM 154981

## (undated) DEVICE — COVER CAMERA OPERATING ROOM

## (undated) DEVICE — SEE MEDLINE ITEM 146292

## (undated) DEVICE — DRESSING TELFA STRL 4X3 LF

## (undated) DEVICE — GLOVE SURG BIOGEL LATEX SZ 7.5

## (undated) DEVICE — NDL PNEUMO INSUFFLATI 120MM

## (undated) DEVICE — TROCAR ENDOPATH XCEL 8MM 10CM